# Patient Record
Sex: FEMALE | Race: WHITE | Employment: FULL TIME | URBAN - METROPOLITAN AREA
[De-identification: names, ages, dates, MRNs, and addresses within clinical notes are randomized per-mention and may not be internally consistent; named-entity substitution may affect disease eponyms.]

---

## 2019-03-26 ENCOUNTER — TELEPHONE (OUTPATIENT)
Dept: GENERAL RADIOLOGY | Age: 54
End: 2019-03-26

## 2019-03-29 ENCOUNTER — HOSPITAL ENCOUNTER (OUTPATIENT)
Dept: GENERAL RADIOLOGY | Age: 54
Discharge: HOME | End: 2019-03-31
Payer: COMMERCIAL

## 2019-03-29 DIAGNOSIS — R92.8 ABNORMAL MAMMOGRAM OF RIGHT BREAST: ICD-10-CM

## 2019-03-29 PROCEDURE — 76098 X-RAY EXAM SURGICAL SPECIMEN: CPT

## 2019-03-29 PROCEDURE — A4648 IMPLANTABLE TISSUE MARKER: HCPCS

## 2019-03-29 PROCEDURE — 88360 TUMOR IMMUNOHISTOCHEM/MANUAL: CPT

## 2019-03-29 PROCEDURE — 88305 TISSUE EXAM BY PATHOLOGIST: CPT

## 2019-03-29 PROCEDURE — 2500000003 HC RX 250 WO HCPCS

## 2019-03-29 NOTE — PROGRESS NOTES
Met with patient prior to her breast biopsy. Instructed on role of breast navigator and on stereotactic breast biopsy procedure. Denies use of blood thinners or aspirin products within the past 5 days. I remained with her during the biopsy to provide instruction and emotional support. One core sampled was obtained, then biopsy stopped per Dr. Zarina Weiss. No tissue marker was placed. Per Dr. Lima Spring instruction, core sample sent to pathology. Patient scheduled to return to for repeat stereotactic biopsy next Friday, if specimen is non diagnostic. Instructed that results will be available in approximately 3-5 days. Instructed that Dr. Arndt Dear will be notified of results. Provided with folder containing my contact information, monthly breast self exam card, and post biopsy discharge instructions. Instructed to call me if she has any questions or concerns about her biopsy. Verbalizes understanding.  Dharmesh RN, OCN, CN-BN

## 2019-04-03 ENCOUNTER — TELEPHONE (OUTPATIENT)
Dept: GENERAL RADIOLOGY | Age: 54
End: 2019-04-03

## 2019-04-04 ENCOUNTER — HOSPITAL ENCOUNTER (OUTPATIENT)
Dept: MRI IMAGING | Age: 54
Discharge: HOME | End: 2019-04-06
Payer: COMMERCIAL

## 2019-04-04 DIAGNOSIS — C50.911 MALIGNANT NEOPLASM OF RIGHT FEMALE BREAST, UNSPECIFIED ESTROGEN RECEPTOR STATUS, UNSPECIFIED SITE OF BREAST (HCC): ICD-10-CM

## 2019-04-04 PROCEDURE — 77049 MRI BREAST C-+ W/CAD BI: CPT

## 2019-04-04 PROCEDURE — 6360000004 HC RX CONTRAST MEDICATION: Performed by: RADIOLOGY

## 2019-04-04 PROCEDURE — A9577 INJ MULTIHANCE: HCPCS | Performed by: RADIOLOGY

## 2019-04-04 RX ADMIN — GADOBENATE DIMEGLUMINE 20 ML: 529 INJECTION, SOLUTION INTRAVENOUS at 13:28

## 2019-04-11 NOTE — H&P
[address] [address]                              HISTORY AND PHYSICAL    PATIENT NAME: Pooja Olson                       :        1965  MED REC NO:   [id number]                            ROOM:  ACCOUNT NO:   [de-identified]                           ADMIT DATE: 2019  PROVIDER:     Linda Lares MD    CHIEF COMPLAINT:  Right breast cancer. HISTORY OF PRESENT ILLNESS:  This is a 25-year-old patient who was seen  in the office after undergoing mammogram, ultrasound-guided biopsy of  the right breast returning with diagnosis of a breast cancer,  infiltrating ductal, with questionable some ductal carcinoma in situ  also. The patient was ER positive, ME positive, and also HER-2  positive. At this point in time, the lesion was on mammogram less than  2 cm and also on MRI 9 x 6 x 7 mm. At this point in time, the patient  was given all risks, benefits, alternatives, has agreed to proceed with  right partial mastectomy with needle localization of sentinel nodes,  subsequent oncology consultation for her situation. PAST HISTORY:  Significant for hysterectomy, D and C, questionable  history of angioedema in _____ 2016. MEDICATIONS:  No medications presently. ALLERGIES:  None. REVIEW OF SYSTEMS:  Essentially negative. No bleeding dyscrasias. No  endocrinological problem. No diabetes. No thyroid. No high blood  pressure. No cardiopulmonary problems. No genitourinary problems. No  skin lesions. No artificial joints. FAMILY HISTORY:  Mother is a breast cancer survivor 24 years from  diagnosis. SOCIAL HISTORY:  No smoking. No coffee. No alcohol. No diet  restrictions. PHYSICAL EXAMINATION:  GENERAL:  This is a 25-year-old, oriented x3. HEENT:  Within normal limits. NECK:  Supple without masses. CHEST:  Clear. CARDIAC:  Normal sinus rhythm. ABDOMEN:  Soft, nontender.   No

## 2019-04-12 NOTE — PROGRESS NOTES
[facility] PRE-ADMISSION TESTING INSTRUCTIONS    The Preadmission Testing patient is instructed accordingly using the following criteria (check applicable):    ARRIVAL INSTRUCTIONS:  [x] Parking the day of Surgery is located in the Main Entrance lot. Upon entering the door, make an immediate right to the surgery reception desk    [x] Complimentary [facility] [employer] [facility] is available Monday through Friday 6 am to 6 pm    [x] Bring photo ID and insurance card    [] Bring in a copy of Living will or Durable Power of  papers. [x] Please be sure to arrange for responsible adult to provide transportation to and from the hospital    [x] Please arrange for responsible adult to be with you for the 24 hour period post procedure due to having anesthesia      GENERAL INSTRUCTIONS:    [x] Nothing by mouth after midnight, including gum, candy, mints or water    [x] You may brush your teeth, but do not swallow any water    [] Take medications as instructed with 1-2 oz of water    [x] Stop herbal supplements and vitamins 5 days prior to procedure    [x] Follow preop dosing of blood thinners per physician instructions    [] Take 1/2 dose of evening insulin, but no insulin after midnight    [] No oral diabetic medications after midnight    [] If diabetic and have low blood sugar or feel symptomatic, take 1-2oz apple juice only    [] Bring inhalers day of surgery    [] Bring C-PAP/ Bi-Pap day of surgery    [] Bring urine specimen day of surgery    [x] Shower or bath with soap, lather and rinse well, AM of Surgery, no lotion, powders or creams to surgical site    [] Follow bowel prep as instructed per surgeon    [] No tobacco products within 24 hours of surgery     [x] No alcohol or illegal drug use within 24 hours of surgery.     [x] Jewelry, body piercing's, eyeglasses, contact lenses and dentures are not permitted into surgery (bring cases)      [x] Please do not wear any nail polish, make up or hair

## 2019-04-15 ENCOUNTER — TELEPHONE (OUTPATIENT)
Dept: GENERAL RADIOLOGY | Age: 54
End: 2019-04-15

## 2019-04-15 NOTE — TELEPHONE ENCOUNTER
Left voicemail message for Lori Delgado in mammography at [facility] regarding the scheduled needle localization for [facility]. I notified her that no biopsy clip was placed at the time of the stereotactic biopsy.   Electronically signed by Huyen Martinez RN, BSN on 4/15/2019 at 8:10 AM

## 2019-04-17 ENCOUNTER — TELEPHONE (OUTPATIENT)
Dept: ONCOLOGY | Age: 54
End: 2019-04-17

## 2019-04-17 ENCOUNTER — TELEPHONE (OUTPATIENT)
Dept: CASE MANAGEMENT | Age: 54
End: 2019-04-17

## 2019-04-17 ENCOUNTER — TELEPHONE (OUTPATIENT)
Dept: INFUSION THERAPY | Age: 54
End: 2019-04-17

## 2019-04-17 ENCOUNTER — OFFICE VISIT (OUTPATIENT)
Dept: ONCOLOGY | Age: 54
End: 2019-04-17
Payer: COMMERCIAL

## 2019-04-17 ENCOUNTER — HOSPITAL ENCOUNTER (OUTPATIENT)
Dept: INFUSION THERAPY | Age: 54
Discharge: HOME | End: 2019-04-17
Payer: COMMERCIAL

## 2019-04-17 VITALS
HEIGHT: 63 IN | DIASTOLIC BLOOD PRESSURE: 56 MMHG | HEART RATE: 65 BPM | RESPIRATION RATE: 18 BRPM | TEMPERATURE: 98.4 F | SYSTOLIC BLOOD PRESSURE: 115 MMHG | BODY MASS INDEX: 23.16 KG/M2 | WEIGHT: 130.7 LBS

## 2019-04-17 DIAGNOSIS — C50.911 INFILTRATING DUCTAL CARCINOMA OF RIGHT BREAST, STAGE 1 (HCC): ICD-10-CM

## 2019-04-17 PROCEDURE — 99205 OFFICE O/P NEW HI 60 MIN: CPT | Performed by: INTERNAL MEDICINE

## 2019-04-17 PROCEDURE — 99214 OFFICE O/P EST MOD 30 MIN: CPT | Performed by: INTERNAL MEDICINE

## 2019-04-17 NOTE — TELEPHONE ENCOUNTER
Left detailed message regarding patient's recommended treatment plan, message states that staff will not be returning to the office until 4-22-19. Spoke with Christelle Del Valle, Left message for Dr. Moni Oneill to contact Dr. Jorge Lazcano at [phone] to discuss patient case, she is scheduled for surgery tomorrow. Briefly met with patient it is recommended pending final pathology that patient will receive [address].

## 2019-04-17 NOTE — TELEPHONE ENCOUNTER
Pt works at [employer], and she scheduled her Echo, ordered by Dr Ryne Figueroa, to be done there on May 1st.

## 2019-04-17 NOTE — PROGRESS NOTES
Irregular, enhancing mass in the right breast 10:00 is consistent  with biopsy-proven neoplasm. No additional foci of disease identified  in the right breast.  2. There is no lymphadenopathy. 3. No evidence of neoplasm on the left. RECOMMENDATION:  Continued surgical and oncologic consultation is advised. BIRADS 6: Known neoplasm    The patient is feeling well, no nipple discharge or breast skin changes. Review of Systems;  CONSTITUTIONAL: No fever, chills. Good appetite and energy level. ENMT: Eyes: No diplopia; Nose: No epistaxis. Mouth: No sore throat. RESPIRATORY: No hemoptysis, shortness of breath, cough. CARDIOVASCULAR: No chest pain, palpitations. GASTROINTESTINAL: No nausea/vomiting, abdominal pain, diarrhea/constipation. GENITOURINARY: No dysuria, urinary frequency, hematuria. NEURO: No syncope, presyncope, headache. MSK: she has chronic pain in the back and right hip. Remainder:  ROS NEGATIVE    Past Medical History:      Diagnosis Date    Cancer [facility])     malignant lesion in Right breast - for OR 4-18-19     Hyperlipidemia     no medications      There is no problem list on file for this patient. Past Surgical History:      Procedure Laterality Date    DILATION AND CURETTAGE OF UTERUS      HYSTERECTOMY         Family History:  History reviewed. No pertinent family history. Medications:  Reviewed and reconciled.     Social History:  Social History     Socioeconomic History    Marital status:      Spouse name: Not on file    Number of children: Not on file    Years of education: Not on file    Highest education level: Not on file   Occupational History    Not on file   Social Needs    Financial resource strain: Not on file    Food insecurity:     Worry: Not on file     Inability: Not on file    Transportation needs:     Medical: Not on file     Non-medical: Not on file   Tobacco Use    Smoking status: Never Smoker    Smokeless tobacco: Never Used   Substance and Sexual Activity    Alcohol use: Not Currently    Drug use: Never    Sexual activity: Not on file   Lifestyle    Physical activity:     Days per week: Not on file     Minutes per session: Not on file    Stress: Not on file   Relationships    Social connections:     Talks on phone: Not on file     Gets together: Not on file     Attends [employer] service: Not on file     Active member of club or organization: Not on file     Attends meetings of clubs or organizations: Not on file     Relationship status: Not on file    Intimate partner violence:     Fear of current or ex partner: Not on file     Emotionally abused: Not on file     Physically abused: Not on file     Forced sexual activity: Not on file   Other Topics Concern    Not on file   Social History Narrative    Not on file       Allergies:  No Known Allergies     OB/GYN:  The patient is , she had her menarche at the age of 9-12, she had a hysterectomy, no oophorectomies, 1st live birth was at 20, she used OCPs for about 10 years. Physical Exam:  BP (!) 115/56 (Site: Left Upper Arm, Position: Sitting, Cuff Size: Medium Adult)   Pulse 65   Temp 98.4 °F (36.9 °C) (Temporal)   Resp 18   Ht 5' 3\" (1.6 m)   Wt 130 lb 11.2 oz (59.3 kg)   BMI 23.15 kg/m²   GENERAL: Alert, oriented x 3, not in acute distress. HEENT: PERRLA; EOMI. Oropharynx clear. NECK: Supple. No palpable cervical or supraclavicular lymphadenopathy. LUNGS: Good air entry bilaterally. No wheezing, crackles or rhonchi. BREASTS: The left breast exam is negative for any skin changes, no nipple discharge, no palpable masses, no palpable left axillary lymphadenopathy, the right breast exam is negative for any nipple discharge, no discrete masses, no skin erythema, no palpable right axillary lymphadenopathy. CARDIOVASCULAR: Regular rate. No murmurs, rubs or gallops. ABDOMEN: Soft. Non-tender, non-distended. Positive bowel sounds.   EXTREMITIES: Without clubbing, cyanosis, or edema.   NEUROLOGIC: No focal deficits. ECOG PS 0    Impression/Plan:      The patient is a 43-year-old lady with a PMH significant for hyperlipidemia, who had presented with an abnormal screening Mammogram, she was diagnosed with a right breast invasive ductal carcinoma, grade 2, measuring 9 mm, ER positive, 80% NJ +5% HER-2/dougie positive, 3+ by IHC. Patient with clinical stage [id number] disease. I discussed with the patient and her spouse her diagnosis, characteristics of her tumor, prognosis, and recommendations for adjuvant treatment. The patient has a triple positive breast cancer, tumor size less than 1cm, grade 2, and clinically negative lymph nodes, the patient is scheduled for a right breast lumpectomy and excisional SLNB,  adjuvant chemotherapy with TCH regimen is recommended, adjuvant endocrine therapy with an AI if she is postmenopausal is also recommended, will order for the patient biochemical testing to evaluate her menopausal status. The side effects of the treatment were reviewed with the patient, he will have a chemo teach and 2 D echocariogram, will request from Dr. Serena Steward to place a mediport.      Patient with a personal history of breast cancer, family history is positive for breast and ovarian cancer, testing for HBOC is recommended, MyRisk testing was ordered. RTC in about 2 weeks. Thank you for allowing us to participate in the care of Mrs. Quintanilla.     Gilford Alexandria, MD   HEMATOLOGY/MEDICAL ONCOLOGY  [address] [place] [address] MED ONCOLOGY  [facility] [id number]  Dept: [phone]

## 2019-04-17 NOTE — TELEPHONE ENCOUNTER
At med onc consult, spoke with pt re: ACS programs, resources, and role of ACS navigator. Pt stated that she has no needs at this time but would follow up if needs arise. Provided with ACS navigator's contact information.

## 2019-04-17 NOTE — PROGRESS NOTES
Vivi Hopson  1965 46 y.o. Referring Physician: Rupal Ramachandran    PCP: Shy David MD    Vitals:    19 1422   BP: (!) 115/56   Pulse: 65   Resp: 18   Temp: 98.4 °F (36.9 °C)        Wt Readings from Last 3 Encounters:   19 130 lb 11.2 oz (59.3 kg)        Body mass index is 23.15 kg/m². Chief Complaint: Patient referred d/t right breast pathology infiltrating ductal carcinoma, moderately differentiated, ER positive, OH positive, Her 2 positive. Chief Complaint   Patient presents with    New Patient         Cancer Staging  No matching staging information was found for the patient. Prior Radiation Therapy? NO    Concurrent Chemo/radiation? NO    Prior Chemotherapy? NO    Prior Hormonal Therapy? NO    Head and Neck Cancer? No, patient does NOT have HN cancer. LMP:     Age at first Menses: 6 yrs    : 5    Para: 4        No current outpatient medications on file.        Past Medical History:   Diagnosis Date    Cancer [facility])     malignant lesion in Right breast - for OR -19     Hyperlipidemia     no medications        Past Surgical History:   Procedure Laterality Date    BREAST BIOPSY      DILATION AND CURETTAGE OF UTERUS      HYSTERECTOMY         Family History   Problem Relation Age of Onset    Breast Cancer Mother     Cancer Father         esophageal, stomach    Cancer Paternal Grandfather         colon       Social History     Socioeconomic History    Marital status:      Spouse name: Not on file    Number of children: Not on file    Years of education: Not on file    Highest education level: Not on file   Occupational History    Not on file   Social Needs    Financial resource strain: Not on file    Food insecurity:     Worry: Not on file     Inability: Not on file    Transportation needs:     Medical: Not on file     Non-medical: Not on file   Tobacco Use    Smoking status: Never Smoker    Smokeless tobacco: Never Used   Substance and Sexual Activity    Alcohol use: Not Currently    Drug use: Never    Sexual activity: Yes     Partners: Male   Lifestyle    Physical activity:     Days per week: Not on file     Minutes per session: Not on file    Stress: Not on file   Relationships    Social connections:     Talks on phone: Not on file     Gets together: Not on file     Attends Hoahaoism service: Not on file     Active member of club or organization: Not on file     Attends meetings of clubs or organizations: Not on file     Relationship status: Not on file    Intimate partner violence:     Fear of current or ex partner: Not on file     Emotionally abused: Not on file     Physically abused: Not on file     Forced sexual activity: Not on file   Other Topics Concern    Not on file   Social History Narrative    Not on file           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 Pacemaker/Defibulator/ICD:  No    Mediport: No           FALLS RISK SCREENING ASSESSMENT    Instructions:  Assess the patient and Marshall the appropriate indicators that are present for fall risk identification. Total the numbers circled and assign a fall risk score from Table 2.  Reassess patient at a minimum every 12 weeks or with status change. Assessment   Date  4/17/2019     1. Mental Ability: confusion/cognitively impaired No - 0       2. Elimination Issues: incontinence, frequency No - 0       3. Ambulatory: use of assistive devices (walker, cane, off-loading devices), attached to equipment (IV pole, oxygen) No - 0     4. Sensory Limitations: dizziness, vertigo, impaired vision No - 0       5. Age Less than 65 years - 0       6. Medication: diuretics, strong analgesics, hypnotics, sedatives, antihypertensive agents   No - 0   7.   Falls:  recent history of falls within the last 3 months (not to include slipping or tripping)   No - 0   TOTAL 0    If score of 4 or greater was education given? No       TABLE 2   Risk Score Risk Level Plan of Care   0-3 Little or  No Risk 1. Provide assistance as indicated for ambulation activities  2. Reorient confused/cognitively impaired patient  3. Call-light/bell within patient's reach  4. Chair/bed in low position, stretcher/bed with siderails up except when performing patient care activities  5. Educate patient/family/caregiver on falls prevention  6.  Reassess in 12 weeks or with any noted change in patient condition which places them at a risk for a fall   4-6 Moderate Risk 1. Provide assistance as indicated for ambulation activities  2. Reorient confused/cognitively impaired patient  3. Call-light/bell within patient's reach  4. Chair/bed in low position, stretcher/bed with siderails up except when performing patient care activities  5. Educate patient/family/caregiver on falls prevention  6. Falls risk precaution (Yellow sticker Level II) placed on patient chart   7 or   Higher High Risk 1. Place patient in easily observable treatment room  2. Patient attended at all times by family member or staff  3. Provide assistance as indicated for ambulation activities  4. Reorient confused/cognitively impaired patient  5. Call-light/bell within patient's reach  6. Chair/bed in low position, stretcher/bed with siderails up except when performing patient care activities  7. Educate patient/family/caregiver on falls prevention  8. Falls risk precaution (Yellow sticker Level III) placed on patient chart           MALNUTRITION RISK SCREENING ASSESSMENT    Instructions:  Assess the patient and enter the appropriate indicators that are present for nutrition risk identification. Total the numbers entered and assign a risk score. Follow the appropriate action for total score listed below. Assessment   Date  4/17/2019     1. Have you lost weight without trying? 0- No     2. Have you been eating poorly because of a decreased appetite?

## 2019-04-18 ENCOUNTER — APPOINTMENT (OUTPATIENT)
Dept: MAMMOGRAPHY | Age: 54
End: 2019-04-18
Attending: SURGERY
Payer: COMMERCIAL

## 2019-04-18 ENCOUNTER — ANESTHESIA EVENT (OUTPATIENT)
Dept: OPERATING ROOM | Age: 54
End: 2019-04-18
Payer: COMMERCIAL

## 2019-04-18 ENCOUNTER — ANESTHESIA (OUTPATIENT)
Dept: OPERATING ROOM | Age: 54
End: 2019-04-18
Payer: COMMERCIAL

## 2019-04-18 ENCOUNTER — HOSPITAL ENCOUNTER (OUTPATIENT)
Age: 54
Setting detail: OUTPATIENT SURGERY
Discharge: HOME | End: 2019-04-18
Attending: SURGERY | Admitting: SURGERY
Payer: COMMERCIAL

## 2019-04-18 VITALS
DIASTOLIC BLOOD PRESSURE: 68 MMHG | SYSTOLIC BLOOD PRESSURE: 116 MMHG | TEMPERATURE: 97 F | HEIGHT: 63 IN | HEART RATE: 78 BPM | RESPIRATION RATE: 20 BRPM | OXYGEN SATURATION: 98 % | BODY MASS INDEX: 22.68 KG/M2 | WEIGHT: 128 LBS

## 2019-04-18 VITALS
DIASTOLIC BLOOD PRESSURE: 64 MMHG | OXYGEN SATURATION: 100 % | RESPIRATION RATE: 2 BRPM | SYSTOLIC BLOOD PRESSURE: 129 MMHG

## 2019-04-18 DIAGNOSIS — C50.911 MALIGNANT NEOPLASM OF RIGHT FEMALE BREAST, UNSPECIFIED ESTROGEN RECEPTOR STATUS, UNSPECIFIED SITE OF BREAST (HCC): ICD-10-CM

## 2019-04-18 LAB
HCT VFR BLD CALC: 43.7 % (ref 34–48)
HEMOGLOBIN: 13.6 G/DL (ref 11.5–15.5)
MCH RBC QN AUTO: 30.4 PG (ref 26–35)
MCHC RBC AUTO-ENTMCNC: 31.1 % (ref 32–34.5)
MCV RBC AUTO: 97.8 FL (ref 80–99.9)
PDW BLD-RTO: 13.1 FL (ref 11.5–15)
PLATELET # BLD: 276 E9/L (ref 130–450)
PMV BLD AUTO: 10.3 FL (ref 7–12)
RBC # BLD: 4.47 E12/L (ref 3.5–5.5)
WBC # BLD: 4.8 E9/L (ref 4.5–11.5)

## 2019-04-18 PROCEDURE — 6370000000 HC RX 637 (ALT 250 FOR IP)

## 2019-04-18 PROCEDURE — 7100000011 HC PHASE II RECOVERY - ADDTL 15 MIN: Performed by: SURGERY

## 2019-04-18 PROCEDURE — 7100000010 HC PHASE II RECOVERY - FIRST 15 MIN: Performed by: SURGERY

## 2019-04-18 PROCEDURE — 76098 X-RAY EXAM SURGICAL SPECIMEN: CPT

## 2019-04-18 PROCEDURE — 36415 COLL VENOUS BLD VENIPUNCTURE: CPT

## 2019-04-18 PROCEDURE — 3700000000 HC ANESTHESIA ATTENDED CARE: Performed by: SURGERY

## 2019-04-18 PROCEDURE — 6360000002 HC RX W HCPCS: Performed by: SURGERY

## 2019-04-18 PROCEDURE — 7100000000 HC PACU RECOVERY - FIRST 15 MIN: Performed by: SURGERY

## 2019-04-18 PROCEDURE — 7100000001 HC PACU RECOVERY - ADDTL 15 MIN: Performed by: SURGERY

## 2019-04-18 PROCEDURE — 19281 PERQ DEVICE BREAST 1ST IMAG: CPT

## 2019-04-18 PROCEDURE — 2500000003 HC RX 250 WO HCPCS: Performed by: NURSE ANESTHETIST, CERTIFIED REGISTERED

## 2019-04-18 PROCEDURE — 88307 TISSUE EXAM BY PATHOLOGIST: CPT

## 2019-04-18 PROCEDURE — 2580000003 HC RX 258: Performed by: SURGERY

## 2019-04-18 PROCEDURE — 88333 PATH CONSLTJ SURG CYTO XM 1: CPT

## 2019-04-18 PROCEDURE — 2709999900 HC NON-CHARGEABLE SUPPLY: Performed by: SURGERY

## 2019-04-18 PROCEDURE — 2580000003 HC RX 258: Performed by: NURSE ANESTHETIST, CERTIFIED REGISTERED

## 2019-04-18 PROCEDURE — 85027 COMPLETE CBC AUTOMATED: CPT

## 2019-04-18 PROCEDURE — 88334 PATH CONSLTJ SURG CYTO XM EA: CPT

## 2019-04-18 PROCEDURE — 3600000012 HC SURGERY LEVEL 2 ADDTL 15MIN: Performed by: SURGERY

## 2019-04-18 PROCEDURE — 3700000001 HC ADD 15 MINUTES (ANESTHESIA): Performed by: SURGERY

## 2019-04-18 PROCEDURE — 6360000002 HC RX W HCPCS

## 2019-04-18 PROCEDURE — 6360000002 HC RX W HCPCS: Performed by: NURSE ANESTHETIST, CERTIFIED REGISTERED

## 2019-04-18 PROCEDURE — 6360000002 HC RX W HCPCS: Performed by: ANESTHESIOLOGY

## 2019-04-18 PROCEDURE — 3600000002 HC SURGERY LEVEL 2 BASE: Performed by: SURGERY

## 2019-04-18 PROCEDURE — 88305 TISSUE EXAM BY PATHOLOGIST: CPT

## 2019-04-18 RX ORDER — FENTANYL CITRATE 50 UG/ML
INJECTION, SOLUTION INTRAMUSCULAR; INTRAVENOUS PRN
Status: DISCONTINUED | OUTPATIENT
Start: 2019-04-18 | End: 2019-04-18 | Stop reason: SDUPTHER

## 2019-04-18 RX ORDER — FENTANYL CITRATE 50 UG/ML
25 INJECTION, SOLUTION INTRAMUSCULAR; INTRAVENOUS EVERY 5 MIN PRN
Status: DISCONTINUED | OUTPATIENT
Start: 2019-04-18 | End: 2019-04-18 | Stop reason: HOSPADM

## 2019-04-18 RX ORDER — PROPOFOL 10 MG/ML
INJECTION, EMULSION INTRAVENOUS PRN
Status: DISCONTINUED | OUTPATIENT
Start: 2019-04-18 | End: 2019-04-18 | Stop reason: SDUPTHER

## 2019-04-18 RX ORDER — DEXAMETHASONE SODIUM PHOSPHATE 4 MG/ML
INJECTION, SOLUTION INTRA-ARTICULAR; INTRALESIONAL; INTRAMUSCULAR; INTRAVENOUS; SOFT TISSUE PRN
Status: DISCONTINUED | OUTPATIENT
Start: 2019-04-18 | End: 2019-04-18 | Stop reason: SDUPTHER

## 2019-04-18 RX ORDER — SODIUM CHLORIDE 0.9 % (FLUSH) 0.9 %
10 SYRINGE (ML) INJECTION EVERY 12 HOURS SCHEDULED
Status: DISCONTINUED | OUTPATIENT
Start: 2019-04-18 | End: 2019-04-18 | Stop reason: HOSPADM

## 2019-04-18 RX ORDER — MIDAZOLAM HYDROCHLORIDE 1 MG/ML
INJECTION INTRAMUSCULAR; INTRAVENOUS PRN
Status: DISCONTINUED | OUTPATIENT
Start: 2019-04-18 | End: 2019-04-18 | Stop reason: SDUPTHER

## 2019-04-18 RX ORDER — FENTANYL CITRATE 50 UG/ML
25 INJECTION, SOLUTION INTRAMUSCULAR; INTRAVENOUS EVERY 5 MIN PRN
Status: CANCELLED | OUTPATIENT
Start: 2019-04-18

## 2019-04-18 RX ORDER — ONDANSETRON 2 MG/ML
INJECTION INTRAMUSCULAR; INTRAVENOUS PRN
Status: DISCONTINUED | OUTPATIENT
Start: 2019-04-18 | End: 2019-04-18 | Stop reason: SDUPTHER

## 2019-04-18 RX ORDER — FENTANYL CITRATE 50 UG/ML
50 INJECTION, SOLUTION INTRAMUSCULAR; INTRAVENOUS EVERY 5 MIN PRN
Status: DISCONTINUED | OUTPATIENT
Start: 2019-04-18 | End: 2019-04-18 | Stop reason: HOSPADM

## 2019-04-18 RX ORDER — HYDROCODONE BITARTRATE AND ACETAMINOPHEN 5; 325 MG/1; MG/1
TABLET ORAL
Status: COMPLETED
Start: 2019-04-18 | End: 2019-04-18

## 2019-04-18 RX ORDER — HYDROCODONE BITARTRATE AND ACETAMINOPHEN 5; 325 MG/1; MG/1
1 TABLET ORAL ONCE
Status: COMPLETED | OUTPATIENT
Start: 2019-04-18 | End: 2019-04-18

## 2019-04-18 RX ORDER — METHYLENE BLUE 10 MG/ML
INJECTION INTRAVENOUS PRN
Status: DISCONTINUED | OUTPATIENT
Start: 2019-04-18 | End: 2019-04-18 | Stop reason: ALTCHOICE

## 2019-04-18 RX ORDER — LIDOCAINE HYDROCHLORIDE 20 MG/ML
INJECTION, SOLUTION EPIDURAL; INFILTRATION; INTRACAUDAL; PERINEURAL PRN
Status: DISCONTINUED | OUTPATIENT
Start: 2019-04-18 | End: 2019-04-18 | Stop reason: SDUPTHER

## 2019-04-18 RX ORDER — MEPERIDINE HYDROCHLORIDE 25 MG/ML
25 INJECTION INTRAMUSCULAR; INTRAVENOUS; SUBCUTANEOUS EVERY 5 MIN PRN
Status: DISCONTINUED | OUTPATIENT
Start: 2019-04-18 | End: 2019-04-18 | Stop reason: HOSPADM

## 2019-04-18 RX ORDER — ONDANSETRON 2 MG/ML
4 INJECTION INTRAMUSCULAR; INTRAVENOUS
Status: DISCONTINUED | OUTPATIENT
Start: 2019-04-18 | End: 2019-04-18 | Stop reason: HOSPADM

## 2019-04-18 RX ORDER — SODIUM CHLORIDE, SODIUM LACTATE, POTASSIUM CHLORIDE, CALCIUM CHLORIDE 600; 310; 30; 20 MG/100ML; MG/100ML; MG/100ML; MG/100ML
INJECTION, SOLUTION INTRAVENOUS CONTINUOUS
Status: DISCONTINUED | OUTPATIENT
Start: 2019-04-18 | End: 2019-04-18 | Stop reason: HOSPADM

## 2019-04-18 RX ORDER — SODIUM CHLORIDE, SODIUM LACTATE, POTASSIUM CHLORIDE, CALCIUM CHLORIDE 600; 310; 30; 20 MG/100ML; MG/100ML; MG/100ML; MG/100ML
INJECTION, SOLUTION INTRAVENOUS CONTINUOUS PRN
Status: DISCONTINUED | OUTPATIENT
Start: 2019-04-18 | End: 2019-04-18 | Stop reason: SDUPTHER

## 2019-04-18 RX ADMIN — ONDANSETRON HYDROCHLORIDE 4 MG: 2 INJECTION, SOLUTION INTRAMUSCULAR; INTRAVENOUS at 11:13

## 2019-04-18 RX ADMIN — HYDROMORPHONE HYDROCHLORIDE 0.25 MG: 1 INJECTION, SOLUTION INTRAMUSCULAR; INTRAVENOUS; SUBCUTANEOUS at 12:30

## 2019-04-18 RX ADMIN — HYDROMORPHONE HYDROCHLORIDE 0.25 MG: 1 INJECTION, SOLUTION INTRAMUSCULAR; INTRAVENOUS; SUBCUTANEOUS at 12:14

## 2019-04-18 RX ADMIN — HYDROCODONE BITARTRATE AND ACETAMINOPHEN 1 TABLET: 5; 325 TABLET ORAL at 13:42

## 2019-04-18 RX ADMIN — SODIUM CHLORIDE, POTASSIUM CHLORIDE, SODIUM LACTATE AND CALCIUM CHLORIDE: 600; 310; 30; 20 INJECTION, SOLUTION INTRAVENOUS at 11:53

## 2019-04-18 RX ADMIN — HYDROMORPHONE HYDROCHLORIDE 0.25 MG: 1 INJECTION, SOLUTION INTRAMUSCULAR; INTRAVENOUS; SUBCUTANEOUS at 11:54

## 2019-04-18 RX ADMIN — HYDROMORPHONE HYDROCHLORIDE 0.25 MG: 1 INJECTION, SOLUTION INTRAMUSCULAR; INTRAVENOUS; SUBCUTANEOUS at 12:25

## 2019-04-18 RX ADMIN — PROPOFOL 150 MG: 10 INJECTION, EMULSION INTRAVENOUS at 10:58

## 2019-04-18 RX ADMIN — FENTANYL CITRATE 100 MCG: 50 INJECTION, SOLUTION INTRAMUSCULAR; INTRAVENOUS at 10:55

## 2019-04-18 RX ADMIN — HYDROMORPHONE HYDROCHLORIDE 0.25 MG: 1 INJECTION, SOLUTION INTRAMUSCULAR; INTRAVENOUS; SUBCUTANEOUS at 12:09

## 2019-04-18 RX ADMIN — HYDROMORPHONE HYDROCHLORIDE 0.25 MG: 1 INJECTION, SOLUTION INTRAMUSCULAR; INTRAVENOUS; SUBCUTANEOUS at 12:19

## 2019-04-18 RX ADMIN — HYDROMORPHONE HYDROCHLORIDE 0.25 MG: 1 INJECTION, SOLUTION INTRAMUSCULAR; INTRAVENOUS; SUBCUTANEOUS at 12:04

## 2019-04-18 RX ADMIN — HYDROMORPHONE HYDROCHLORIDE 0.25 MG: 1 INJECTION, SOLUTION INTRAMUSCULAR; INTRAVENOUS; SUBCUTANEOUS at 11:59

## 2019-04-18 RX ADMIN — MIDAZOLAM HYDROCHLORIDE 2 MG: 1 INJECTION, SOLUTION INTRAMUSCULAR; INTRAVENOUS at 10:55

## 2019-04-18 RX ADMIN — Medication 2 G: at 10:55

## 2019-04-18 RX ADMIN — SODIUM CHLORIDE, POTASSIUM CHLORIDE, SODIUM LACTATE AND CALCIUM CHLORIDE: 600; 310; 30; 20 INJECTION, SOLUTION INTRAVENOUS at 10:55

## 2019-04-18 RX ADMIN — DEXAMETHASONE SODIUM PHOSPHATE 8 MG: 4 INJECTION, SOLUTION INTRAMUSCULAR; INTRAVENOUS at 11:13

## 2019-04-18 RX ADMIN — LIDOCAINE HYDROCHLORIDE 40 MG: 20 INJECTION, SOLUTION EPIDURAL; INFILTRATION; INTRACAUDAL; PERINEURAL at 10:58

## 2019-04-18 ASSESSMENT — PULMONARY FUNCTION TESTS
PIF_VALUE: 2
PIF_VALUE: 12
PIF_VALUE: 9
PIF_VALUE: 2
PIF_VALUE: 1
PIF_VALUE: 2
PIF_VALUE: 11
PIF_VALUE: 2
PIF_VALUE: 0
PIF_VALUE: 16
PIF_VALUE: 14
PIF_VALUE: 2
PIF_VALUE: 1
PIF_VALUE: 1
PIF_VALUE: 2
PIF_VALUE: 2
PIF_VALUE: 10
PIF_VALUE: 12
PIF_VALUE: 15
PIF_VALUE: 2
PIF_VALUE: 1
PIF_VALUE: 11
PIF_VALUE: 2
PIF_VALUE: 17
PIF_VALUE: 2
PIF_VALUE: 9
PIF_VALUE: 2
PIF_VALUE: 17
PIF_VALUE: 2
PIF_VALUE: 19
PIF_VALUE: 16
PIF_VALUE: 2
PIF_VALUE: 1
PIF_VALUE: 2
PIF_VALUE: 17
PIF_VALUE: 4

## 2019-04-18 ASSESSMENT — PAIN SCALES - GENERAL
PAINLEVEL_OUTOF10: 8
PAINLEVEL_OUTOF10: 3
PAINLEVEL_OUTOF10: 8
PAINLEVEL_OUTOF10: 7
PAINLEVEL_OUTOF10: 7
PAINLEVEL_OUTOF10: 8
PAINLEVEL_OUTOF10: 6
PAINLEVEL_OUTOF10: 10
PAINLEVEL_OUTOF10: 8
PAINLEVEL_OUTOF10: 7
PAINLEVEL_OUTOF10: 8

## 2019-04-18 ASSESSMENT — PAIN DESCRIPTION - LOCATION
LOCATION: BREAST
LOCATION: BREAST

## 2019-04-18 ASSESSMENT — PAIN SCALES - WONG BAKER: WONGBAKER_NUMERICALRESPONSE: 0

## 2019-04-18 ASSESSMENT — PAIN - FUNCTIONAL ASSESSMENT: PAIN_FUNCTIONAL_ASSESSMENT: 0-10

## 2019-04-18 ASSESSMENT — PAIN DESCRIPTION - PAIN TYPE: TYPE: SURGICAL PAIN

## 2019-04-18 ASSESSMENT — PAIN DESCRIPTION - ORIENTATION
ORIENTATION: RIGHT
ORIENTATION: RIGHT

## 2019-04-18 NOTE — PROGRESS NOTES
CLINICAL PHARMACY NOTE: MEDS TO [address] Select Patient?: No  Total # of Prescriptions Filled: 1   The following medications were delivered to the patient:  · Hydrocodone/Apap 5-325mg  Total # of Interventions Completed: 4  Time Spent (min): 45    Additional Documentation:

## 2019-04-18 NOTE — ANESTHESIA PRE PROCEDURE
Department of Anesthesiology  Preprocedure Note       Name:  Migue Ma   Age:  46 y.o.  :  1965                                          MRN:  [id number]         Date:  2019      Surgeon: Jean-Paul Henriquez):  Lulu Nugent MD    Procedure: RIGHT PARTIAL MASTECTOMY WITH NEEDLE LOCALIZATION AND SENTINEL NODE   +++NEEDLE LOCAL 0900+++ (Right Breast)    Medications prior to admission:   Prior to Admission medications    Not on File       Current medications:    Current Facility-Administered Medications   Medication Dose Route Frequency Provider Last Rate Last Dose    lactated ringers infusion   Intravenous Continuous Lulu Nugent MD        sodium chloride flush 0.9 % injection 10 mL  10 mL Intravenous 2 times per day Lulu Nugent MD        ceFAZolin (ANCEF) 2 g in dextrose 5 % 100 mL IVPB  2 g Intravenous Once Lulu Nugent MD           Allergies:  No Known Allergies    Problem List:    Patient Active Problem List   Diagnosis Code    Infiltrating ductal carcinoma of right breast, stage 1 ([facility] [address] C50.911       Past Medical History:        Diagnosis Date    Cancer ([facility] [address]     malignant lesion in Right breast - for OR 19     Hyperlipidemia     no medications        Past Surgical History:        Procedure Laterality Date    BREAST BIOPSY      DILATION AND CURETTAGE OF UTERUS      HYSTERECTOMY         Social History:    Social History     Tobacco Use    Smoking status: Never Smoker    Smokeless tobacco: Never Used   Substance Use Topics    Alcohol use: Not Currently                                Counseling given: Not Answered      Vital Signs (Current):   Vitals:    19 1128 19 0738   BP:  (!) 151/64   Pulse:  67   Resp:  18   Temp:  97.8 °F (36.6 °C)   TempSrc:  Temporal   SpO2:  99%   Weight: 128 lb (58.1 kg) 128 lb (58.1 kg)   Height: 5' 3\" (1.6 m) 5' 3\" (1.6 m)                                              BP Readings from Last 3 Encounters:   19 (!) 151/64   19 (!) 115/56 NPO Status: Time of last liquid consumption: 2100                        Time of last solid consumption: 1800                        Date of last liquid consumption: 04/17/19                        Date of last solid food consumption: 04/17/19    BMI:   Wt Readings from Last 3 Encounters:   04/18/19 128 lb (58.1 kg)   04/17/19 130 lb 11.2 oz (59.3 kg)     Body mass index is 22.67 kg/m². CBC:   Lab Results   Component Value Date    WBC 4.8 04/18/2019    RBC 4.47 04/18/2019    HGB 13.6 04/18/2019    HCT 43.7 04/18/2019    MCV 97.8 04/18/2019    RDW 13.1 04/18/2019     04/18/2019       CMP: No results found for: NA, K, CL, CO2, BUN, CREATININE, GFRAA, AGRATIO, LABGLOM, GLUCOSE, PROT, CALCIUM, BILITOT, ALKPHOS, AST, ALT    POC Tests: No results for input(s): POCGLU, POCNA, POCK, POCCL, POCBUN, POCHEMO, POCHCT in the last 72 hours. Coags: No results found for: PROTIME, INR, APTT    HCG (If Applicable): No results found for: PREGTESTUR, PREGSERUM, HCG, HCGQUANT     ABGs: No results found for: PHART, PO2ART, CMY0FAI, GIC5KLH, BEART, R5NYONAK     Type & Screen (If Applicable):  No results found for: [facility]    Anesthesia Evaluation  Patient summary reviewed  Airway: Mallampati: II  TM distance: >3 FB   Neck ROM: full  Mouth opening: > = 3 FB Dental: normal exam         Pulmonary:Negative Pulmonary ROS and normal exam                               Cardiovascular:Negative CV ROS                      Neuro/Psych:   Negative Neuro/Psych ROS              GI/Hepatic/Renal:             Endo/Other:    (+) malignancy/cancer. Abdominal:           Vascular:                                        Anesthesia Plan      general     ASA 2       Induction: intravenous. Anesthetic plan and risks discussed with patient. Plan discussed with CRNA.                   Veronica Hewitt MD   4/18/2019

## 2019-04-18 NOTE — PROGRESS NOTES
INTRAOPERATIVE CONSULTATION (with CYTOLOGY TOUCH PREPARATION/EXAMINATION)      B. Right breast, sentinel nodes (2), biopsy: TP slides show no evidence of malignancy.

## 2019-04-18 NOTE — DISCHARGE INSTR - COC
Discharging to Facility/ Agency   · Name:   · Address:  · Phone:  · Fax:    Dialysis Facility (if applicable)   · Name:  · Address:  · Dialysis Schedule:  · Phone:  · Fax:    / signature: {Esignature:[id number]}    PHYSICIAN SECTION    Prognosis: {Prognosis:9917267849}    Condition at Discharge: Juan Go Patient Condition:515855495}    Rehab Potential (if transferring to Rehab): {Prognosis:6492880681}    Recommended Labs or Other Treatments After Discharge: ***    Physician Certification: I certify the above information and transfer of Misti Luther  is necessary for the continuing treatment of the diagnosis listed and that she requires {Admit to Appropriate Level of Care:12621} for {GREATER/LESS:917725755} 30 days.      Update Admission H&P: {CHP DME Changes in BDEDP:167005540}    PHYSICIAN SIGNATURE:  {Esignature:[id number]}

## 2019-04-19 NOTE — OP NOTE
[id number] [address]                                OPERATIVE REPORT    PATIENT NAME: Jessica Abdi                       :        1965  MED REC NO:   [id number]                            ROOM:  ACCOUNT NO:   [de-identified]                           ADMIT DATE: 2019  PROVIDER:     Rosina Eisenmenger, MD    DATE OF PROCEDURE:  2019    PREOPERATIVE DIAGNOSES:  Invasive ductal carcinoma, ER/WA positive,  HER-2 positive. POSTOPERATIVE DIAGNOSES:  Invasive ductal carcinoma, ER/WA positive,  HER-2 positive, pending path. OPERATION PERFORMED:  Right partial mastectomy after needle localization  with subareolar injection of methylene blue, sentinel node dissection,  and complex closure. DESCRIPTION OF PROCEDURE:  The patient had been needle localized by the  radiologist, right breast, brought to the operative suite, placed under  general anesthesia. Prior to prepping, subareolar injection of a  solution of 50% methylene blue and saline, approximately 10 mL was  injected, massaged for approximately 10 minutes. After which, the  entire right chest, neck, axilla, upper arm, lower and upper abdomen  were prepped with Betadine and draped in usual manner. A curvilinear  incision was made, upper outer quadrant directly over where a needle was  placed, taken down followed to whereupon needle tip was identified. The  mass was then taken out, large portion of breast tissue in a cylindrical  manner, marking the specimen short-superior and long-lateral.  Specimen  was sent to mammography, found to be contained and adequate. Some more  tissue was taken more anterior and superior to be sent separately. From  this incision, we were able to enter the axillary region. Lymphatics  were followed. Two lymph nodes were identified. They were both removed  consistent with sentinel node, sent to pathology for touch prep. Both  were negative on touch prep. The entire area was irrigated well. Hemostasis was achieved. Closure was then done after flaps were made  superior and inferiorly to decrease tension, approximating the tissue  with 3-0 Vicryl suture interrupted inverting for deep as well as  subdermally 4-0 Vicryl subcuticular. Dermabond was applied. Estimated  blood loss was less than 5 mL. The patient tolerated the entire  procedure well.         Shirley Rogers MD    D: 04/18/2019 11:50:22       T: 04/18/2019 14:18:36     [id number]  Job#: [id number]     Doc#: [id number]    CC:  Jose Odonnell MD

## 2019-04-24 LAB
ESTRADIOL LEVEL: <5 PG/ML
FOLLICLE STIMULATING HORMONE: 83.3 MIU/ML
LUTEINIZING HORMONE: 27.8 MIU/ML

## 2019-05-01 ENCOUNTER — TELEPHONE (OUTPATIENT)
Dept: ONCOLOGY | Age: 54
End: 2019-05-01

## 2019-05-01 ENCOUNTER — TELEPHONE (OUTPATIENT)
Dept: VASCULAR SURGERY | Age: 54
End: 2019-05-01

## 2019-05-01 NOTE — TELEPHONE ENCOUNTER
Pt called office and stated they did not insert a mediport, but was referred to another doctor. I returned call and informed pt per doctor that she  cancelled the port. Pt follows with doctor on 5-7-19 for any further questions.

## 2019-05-07 ENCOUNTER — TELEPHONE (OUTPATIENT)
Dept: VASCULAR SURGERY | Age: 54
End: 2019-05-07

## 2019-05-07 ENCOUNTER — HOSPITAL ENCOUNTER (OUTPATIENT)
Dept: INFUSION THERAPY | Age: 54
Discharge: HOME | End: 2019-05-07
Payer: COMMERCIAL

## 2019-05-07 ENCOUNTER — OFFICE VISIT (OUTPATIENT)
Dept: ONCOLOGY | Age: 54
End: 2019-05-07
Payer: COMMERCIAL

## 2019-05-07 VITALS
RESPIRATION RATE: 20 BRPM | HEIGHT: 63 IN | SYSTOLIC BLOOD PRESSURE: 137 MMHG | TEMPERATURE: 97.9 F | DIASTOLIC BLOOD PRESSURE: 78 MMHG | BODY MASS INDEX: 23.12 KG/M2 | WEIGHT: 130.5 LBS | HEART RATE: 75 BPM

## 2019-05-07 DIAGNOSIS — T45.1X5A ALOPECIA DUE TO CYTOTOXIC DRUG: ICD-10-CM

## 2019-05-07 DIAGNOSIS — C50.911 INFILTRATING DUCTAL CARCINOMA OF RIGHT BREAST, STAGE 1 (HCC): Primary | ICD-10-CM

## 2019-05-07 DIAGNOSIS — C50.911 INFILTRATING DUCTAL CARCINOMA OF RIGHT BREAST, STAGE 1 (HCC): ICD-10-CM

## 2019-05-07 DIAGNOSIS — L65.8 ALOPECIA DUE TO CYTOTOXIC DRUG: ICD-10-CM

## 2019-05-07 PROCEDURE — 99212 OFFICE O/P EST SF 10 MIN: CPT | Performed by: INTERNAL MEDICINE

## 2019-05-07 PROCEDURE — 99214 OFFICE O/P EST MOD 30 MIN: CPT | Performed by: INTERNAL MEDICINE

## 2019-05-07 NOTE — TELEPHONE ENCOUNTER
Referral received from Dr. Yogesh Harris for insertion of venous port, message left on pt's answering machine for a return call.

## 2019-05-12 NOTE — PROGRESS NOTES
[address] MED ONCOLOGY  [address] [facility] [address]  Dept: [phone]  Attending Progress Note      Reason for Visit:   Right Breast Cancer. Referring Physician:  Sandie Toscano MD    PCP:  Cornelius Brewer MD    History of Present Illness: The patient is a 77-year-old lady with a PMH significant for hyperlipidemia, who had presented with an abnormal screening Mammogram, was done on 3/12/2019, revealing calcifications in the right UOQ, she had a right breast US done on 3/13/34263, no masses were seen, she underwent on 3/29/2019 a stereotactic biopsy of the right breast calcifications. Due to biopsy needle malfunction and lack of tissue obtained,  a clip was not placed at the time of biopsy, path:    Breast, right, site not further specified, core needle biopsy:  Infiltrating ductal carcinoma, moderately differentiated. Ductal carcinoma in situ, comedo type, high nuclear grade. Comment:     Intradepartmental consultation is obtained.  Infiltrating  ductal carcinoma corresponds to architectural grade 3, nuclear grade 2,  mitotic grade 1; overall grade 6/9-moderately differentiated; grade 2. Breast Cancer Marker Studies:  Estrogen Receptors (ER): Positive (80% nuclei staining). Staining intensity = strong. Progesterone Receptors (IN): Positive (5% nuclei staining). Staining intensity = weak. Her-2/dougie (c-erb B-2) protein expression:  Positive (3+). She had bilateral Breast MRI done on 4/4/2019 revealing: There is scattered fibroglandular tissue with minimal background  parenchymal enhancement. An irregular, enhancing mass is noted in the  right breast 10:00 which measures 9 x 6 x 7 mm (image 43 of the axial  T1 postcontrast series). No suspicious mass or non-mass enhancement  seen on the left. There is no lymphadenopathy. Bilateral skin and  nipple areolar complexes are normal. Visualized portions of the chest  and abdomen are unremarkable. Impression  1. Irregular, enhancing mass in the right breast 10:00 is consistent  with biopsy-proven neoplasm. No additional foci of disease identified  in the right breast.  2. There is no lymphadenopathy. 3. No evidence of neoplasm on the left. RECOMMENDATION:  Continued surgical and oncologic consultation is advised. BIRADS 6: Known neoplasm    The patient underwent on 4/18/2019 a right breast needle localized lumpectomy, with SLN excisional biopsy, path:  CANCER CASE SUMMARY  Procedure: Excision (less than total mastectomy; needle localization  partial mastectomy [lumpectomy]). Specimen laterality: Right. Tumor site: Not specified. Tumor size: 0.2 cm (invasive component). Histologic type: Invasive ductal carcinoma. Histologic grade: Glandular/tubular differentiation-score = 3; nuclear  pleomorphism-score = 2; mitotic rate-score = 1.  Overall grade 2;  6/9-moderately differentiated. Tumor focality: Single focus of invasive carcinoma. Ductal carcinoma in situ: Present. Size/extent of DCIS: 0.7 cm.  DCIS completely surrounds a small, residual  focus of infiltrating ductal carcinoma in the current specimen. Architectural pattern: Comedo type. Nuclear grade: Grade III (high nuclear grade). Necrosis: Present, Central (expansive \"comedo\" necrosis). Microcalcifications: Present. Margins: Invasive carcinoma margins-uninvolved by invasive carcinoma. Distance from closest margin-0.9 cm (inferior inked margin). DCIS margins-uninvolved by DCIS.  Distance from closest margin-0.3 cm  (anterior inked margin of resection-specimen A). Note: Specimen C  represents additional anterior/superior breast tissue [3.0 x 2.5 x 1.0  cm] which represents larger final margin of resection for DCIS with  regard to anterior margin of resection). Regional lymph nodes: Uninvolved by tumor cells.  Number of lymph nodes  examined-4 (designated sentinel lymph nodes).  Number of lymph nodes  involved-0.   Treatment effect: No known presurgical therapy. Pathologic stage: pT1a-0.2 cm maximum dimension. Regional lymph nodes: (sn) pN0-no regional lymph node metastasis. Distant metastasis: pMX-cannot be assessed. Ancillary studies: Performed on the patient's previous biopsy specimen  (AEX-; 3/29/2019). Microcalcifications: Present in DCIS. Additional pathologic findings: Fibrocystic change.  Previous  biopsy/excision site. She recovered well from the surgery, she has no complaints. Review of Systems;  CONSTITUTIONAL: No fever, chills. Good appetite and energy level. ENMT: Eyes: No diplopia; Nose: No epistaxis. Mouth: No sore throat. RESPIRATORY: No hemoptysis, shortness of breath, cough. CARDIOVASCULAR: No chest pain, palpitations. GASTROINTESTINAL: No nausea/vomiting, abdominal pain, diarrhea/constipation. GENITOURINARY: No dysuria, urinary frequency, hematuria. NEURO: No syncope, presyncope, headache. MSK: she has chronic pain in the back and right hip. Remainder:  ROS NEGATIVE    Past Medical History:      Diagnosis Date    Cancer [facility])     malignant lesion in Right breast - for OR 4-18-19     Hyperlipidemia     no medications      Patient Active Problem List   Diagnosis    Infiltrating ductal carcinoma of right breast, stage 1 (HCC)        Past Surgical History:      Procedure Laterality Date    BREAST BIOPSY      BREAST BIOPSY Right 4/18/2019    RIGHT PARTIAL MASTECTOMY WITH NEEDLE LOCALIZATION AND SENTINEL NODE performed by Bessy Porter MD at [facility] AND CURETTAGE OF UTERUS      HYSTERECTOMY         Family History:  Family History   Problem Relation Age of Onset    Breast Cancer Mother     Cancer Father         esophageal, stomach    Cancer Paternal Grandfather         colon       Medications:  Reviewed and reconciled.     Social History:  Social History     Socioeconomic History    Marital status:      Spouse name: Not on file    Number of children: Not on file    Years of education: Not on file    Highest education level: Not on file   Occupational History    Not on file   Social Needs    Financial resource strain: Not on file    Food insecurity:     Worry: Not on file     Inability: Not on file    Transportation needs:     Medical: Not on file     Non-medical: Not on file   Tobacco Use    Smoking status: Never Smoker    Smokeless tobacco: Never Used   Substance and Sexual Activity    Alcohol use: Not Currently    Drug use: Never    Sexual activity: Yes     Partners: Male   Lifestyle    Physical activity:     Days per week: Not on file     Minutes per session: Not on file    Stress: Not on file   Relationships    Social connections:     Talks on phone: Not on file     Gets together: Not on file     Attends Caodaism service: Not on file     Active member of club or organization: Not on file     Attends meetings of clubs or organizations: Not on file     Relationship status: Not on file    Intimate partner violence:     Fear of current or ex partner: Not on file     Emotionally abused: Not on file     Physically abused: Not on file     Forced sexual activity: Not on file   Other Topics Concern    Not on file   Social History Narrative    Not on file       Allergies:  No Known Allergies     OB/GYN:  The patient is , she had her menarche at the age of 9-12, she had a hysterectomy, no oophorectomies, 1st live birth was at 20, she used OCPs for about 10 years. Physical Exam:  /78 (Site: Right Upper Arm, Position: Sitting, Cuff Size: Medium Adult)   Pulse 75   Temp 97.9 °F (36.6 °C) (Temporal)   Resp 20   Ht 5' 3\" (1.6 m)   Wt 130 lb 8 oz (59.2 kg)   BMI 23.12 kg/m²   GENERAL: Alert, oriented x 3, not in acute distress. HEENT: PERRLA; EOMI. Oropharynx clear. NECK: Supple. No palpable cervical or supraclavicular lymphadenopathy. LUNGS: Good air entry bilaterally. No wheezing, crackles or rhonchi.    BREASTS: The left breast exam is negative for any skin changes, no nipple discharge, no palpable masses, no palpable left axillary lymphadenopathy, the right breast exam is negative for an incision from her recent lumpectomy, it is helaing nicely,  no palpable masses, no skin erythema, no palpable right axillary lymphadenopathy. CARDIOVASCULAR: Regular rate. No murmurs, rubs or gallops. ABDOMEN: Soft. Non-tender, non-distended. Positive bowel sounds. EXTREMITIES: Without clubbing, cyanosis, or edema. NEUROLOGIC: No focal deficits. ECOG PS 0    Impression/Plan:      The patient is a 50-year-old postmenopausal lady with a PMH significant for hyperlipidemia, who had presented with an abnormal screening Mammogram, she was diagnosed with a right breast invasive ductal carcinoma, grade 2, measuring 9 mm, ER positive, 80% AL +5% HER-2/dougie positive, 3+ by IHC. Patient with clinical stage [id number] disease, she underwent on 4/18/2019 a right breast needle localized lumpectomy, with SLN excisional biopsy, tumor size is 2mm, grade 2, with associated DCIS, measuring 7mm, high grade, comedo type with central necrosis, margins are negative, 4 SN were removed, they were all neg for metastatic disease, final pathologic stage pT1a(sn)pN0M0, prognostic stage IA disease. I discussed with the patient and her spouse pathology results, stage and prognosis, adjuvant RT and adjuvant ET with an AI are recommended.  The patient has a small Her2 pos disease (<5mm), I reviewed with her the nccn guidelines and the risks of the  Chemotherapy, she is young and healthy, she is very nervous about the diagnosis of breast cancer, she is very well informed about her disease, she prefers to be treated, [address] was originally planned, but given the size of the tumor (<5mm), would treat with Taxol (weekly for 12 weeks) with herceptin, the side effects of the treatment including were discussed with the patient, she will have a port placed with Dr. Lavelle Pablo, chemo teach, 2D echo has been done LVEF is adequate for treatment with Herceptin. Patient with a personal history of breast cancer, family history is positive for breast and ovarian cancer, testing for HBOC is recommended, MyRisk testing was done, neg for clinically significant mutations. RTC with chemo start. Thank you for allowing us to participate in the care of Mrs. Quintanilla.     Roberta Blas MD   HEMATOLOGY/MEDICAL ONCOLOGY  [facility] [address] MED ONCOLOGY  [address] [place] [id number]  Dept: [phone]

## 2019-05-14 ENCOUNTER — OFFICE VISIT (OUTPATIENT)
Dept: ONCOLOGY | Age: 54
End: 2019-05-14
Payer: COMMERCIAL

## 2019-05-14 DIAGNOSIS — T45.1X5A CHEMOTHERAPY-INDUCED NAUSEA AND VOMITING: ICD-10-CM

## 2019-05-14 DIAGNOSIS — C50.911 INFILTRATING DUCTAL CARCINOMA OF RIGHT BREAST, STAGE 1 (HCC): Primary | ICD-10-CM

## 2019-05-14 DIAGNOSIS — R11.2 CHEMOTHERAPY-INDUCED NAUSEA AND VOMITING: ICD-10-CM

## 2019-05-14 PROCEDURE — 99215 OFFICE O/P EST HI 40 MIN: CPT | Performed by: INTERNAL MEDICINE

## 2019-05-14 PROCEDURE — 99999 PR OFFICE/OUTPT VISIT,PROCEDURE ONLY: CPT | Performed by: INTERNAL MEDICINE

## 2019-05-14 RX ORDER — PROCHLORPERAZINE MALEATE 10 MG
10 TABLET ORAL EVERY 6 HOURS PRN
Qty: 120 TABLET | Refills: 1 | Status: SHIPPED
Start: 2019-05-14 | End: 2021-01-22

## 2019-05-14 RX ORDER — LIDOCAINE AND PRILOCAINE 25; 25 MG/G; MG/G
CREAM TOPICAL
Qty: 30 G | Refills: 5 | Status: SHIPPED | OUTPATIENT
Start: 2019-05-14 | End: 2019-11-26 | Stop reason: ALTCHOICE

## 2019-05-14 RX ORDER — ONDANSETRON 4 MG/1
4 TABLET, FILM COATED ORAL EVERY 8 HOURS PRN
Qty: 90 TABLET | Refills: 1 | Status: SHIPPED
Start: 2019-05-14 | End: 2021-01-22

## 2019-05-14 RX ORDER — DEXAMETHASONE 4 MG/1
4 TABLET ORAL 2 TIMES DAILY
Qty: 40 TABLET | Refills: 0 | Status: SHIPPED | OUTPATIENT
Start: 2019-05-14 | End: 2019-07-09 | Stop reason: SDUPTHER

## 2019-05-14 NOTE — PROGRESS NOTES
Spoke with patient about Adjuvant chemotherapy (Weekly Taxol x 12 weeks + Herceptin every 3 weeks for 1 year). We went over the protocol to be used, what to expect as far as side effects, and when to call with problems. This was intended to reinforce the education done by Dr. Yogesh Harris. Patient was given an [address] provided by [employer]. Patient was provide lab requisitions for blood work prior to chemotherapy, Rx for cranial prothsis, and sent in the following prescriptions for patient to have prior to start of chemotherapy - compazine, dexamethasone, zofran, emla cream for port access for treatment days. Patient was provided medication and dietary handouts to the patient to take home and told patient to call if there are any questions once they had a chance to absorb the information. Patient had the opportunity to ask questions with all questions being answered to their satisfaction. The patient expresses understanding and acceptance of instructions.     Electronically signed by Teressa Abreu, [address] [place] [address] on 5/14/2019 at 2:43 PM      \

## 2019-05-15 ENCOUNTER — OFFICE VISIT (OUTPATIENT)
Dept: VASCULAR SURGERY | Age: 54
End: 2019-05-15
Payer: COMMERCIAL

## 2019-05-15 VITALS — DIASTOLIC BLOOD PRESSURE: 70 MMHG | SYSTOLIC BLOOD PRESSURE: 102 MMHG | HEART RATE: 70 BPM

## 2019-05-15 DIAGNOSIS — C50.911 INFILTRATING DUCTAL CARCINOMA OF RIGHT BREAST, STAGE 1 (HCC): Primary | ICD-10-CM

## 2019-05-15 PROCEDURE — 99204 OFFICE O/P NEW MOD 45 MIN: CPT | Performed by: SURGERY

## 2019-05-15 NOTE — PROGRESS NOTES
[address] PRE-ADMISSION TESTING GENERAL INSTRUCTIONS- [facility]-phone number:[phone]    GENERAL INSTRUCTIONS  [x] Antibacterial Soap shower Night before and/or AM of Surgery  [] Ron wipe instruction sheet and wipes given. [x] Nothing by mouth after midnight, including gum, candy, mints, or water. [x] You may brush your teeth, gargle, but do NOT swallow water. []Hibiclens shower  the night before and the morning of surgery. Do not use             Hibiclens on your face or head. []No smoking, chewing tobacco, illegal drugs, or alcohol within 24 hours of your surgery. [] Jewelry, valuables or body piercing's should not be brought to the hospital. All body and/or tongue piercing's must be removed prior to arriving to hospital.  ALL hair pins must be removed. [] Do not wear makeup, lotions, powders, deodorant. Nail polish as directed by the nurse. [] Arrange transportation with a responsible adult  to and from the hospital. If you do not have a responsible adult  to transport you, you will need to make arrangements with a medical transportation company (i.e. [employer]. A Uber/taxi/bus is not appropriate unless you are accompanied by a responsible adult ). Arrange for someone to be with you for the remainder of the day and for 24 hours after your procedure due to having had anesthesia. Who will be your  for transportation?__________________   Who will be staying with you for 24 hrs after your procedure?__________________  [] Bring insurance card and photo ID.  [] Transfusion Bracelet: Please bring with you to hospital, day of surgery  [] Bring urine specimen day of surgery. Any small container is acceptable. [] Use inhalers the morning of surgery and bring with you to hospital.  [] Bring copy of living will or healthcare power of  papers to be placed in your electronic record.   [] CPAP/BI-PAP: Please bring your machine if you are to spend

## 2019-05-15 NOTE — PROGRESS NOTES
Chief Complaint:   Chief Complaint   Patient presents with   [address] insertion, breast cancer. HPI:  Patient came to the office, with her , for preoperative discussion prior to prevent of venous port recommended by her oncologist Dr. Theo Ashby, for carcinoma of the right breast, post lumpectomy, with positive lymph nodes, surgery done at [facility] Center by Cesar Charlton    Patient is doing well otherwise and has no major health issues      Patient denies any focal lateralizing neurological symptoms like loss of speech, vision or loss of function of extremity    Patient can walk a few blocks , and denies any symptoms of rest pain    No Known Allergies    Current Outpatient Medications   Medication Sig Dispense Refill    dexamethasone (DECADRON) 4 MG tablet Take 1 tablet by mouth 2 times daily Take for 3 days, Starting the day before chemotherapy, the day of and day after chemotherapy. 40 tablet 0    prochlorperazine (COMPAZINE) 10 MG tablet Take 1 tablet by mouth every 6 hours as needed (nausea) 120 tablet 1    lidocaine-prilocaine (EMLA) 2.5-2.5 % cream Apply 1 hour before your scheduled chemotherapy,cream will last up to 3 hours. Squeeze a small amount,the size of a quarter onto your port. 30 g 5    ondansetron (ZOFRAN) 4 MG tablet Take 1 tablet by mouth every 8 hours as needed for Nausea or Vomiting 90 tablet 1     No current facility-administered medications for this visit.         Past Medical History:   Diagnosis Date    Cancer [facility])     malignant lesion in Right breast - for OR 4-18-19     Hyperlipidemia     no medications        Past Surgical History:   Procedure Laterality Date    BREAST BIOPSY      BREAST BIOPSY Right 4/18/2019    RIGHT PARTIAL MASTECTOMY WITH NEEDLE LOCALIZATION AND SENTINEL NODE performed by Elidia Aguilar MD at [address]      HYSTERECTOMY         Family History   Problem Relation Age of Onset    Breast Cancer Mother     Cancer Father         esophageal, stomach    Cancer Paternal Grandfather         colon       Social History     Socioeconomic History    Marital status:      Spouse name: Not on file    Number of children: Not on file    Years of education: Not on file    Highest education level: Not on file   Occupational History    Not on file   Social Needs    Financial resource strain: Not on file    Food insecurity:     Worry: Not on file     Inability: Not on file    Transportation needs:     Medical: Not on file     Non-medical: Not on file   Tobacco Use    Smoking status: Never Smoker    Smokeless tobacco: Never Used   Substance and Sexual Activity    Alcohol use: Not Currently    Drug use: Never    Sexual activity: Yes     Partners: Male   Lifestyle    Physical activity:     Days per week: Not on file     Minutes per session: Not on file    Stress: Not on file   Relationships    Social connections:     Talks on phone: Not on file     Gets together: Not on file     Attends Sikhism service: Not on file     Active member of club or organization: Not on file     Attends meetings of clubs or organizations: Not on file     Relationship status: Not on file    Intimate partner violence:     Fear of current or ex partner: Not on file     Emotionally abused: Not on file     Physically abused: Not on file     Forced sexual activity: Not on file   Other Topics Concern    Not on file   Social History Narrative    Not on file       Review of Systems:  Skin:  No abnormal pigmentation or rash  Eyes:  No blurring, diplopia or vision loss  Ears/Nose/Throat:  No hearing loss or vertigo  Respiratory:  No cough, pleuritic chest pain, dyspnea, or wheezing. Cardiovascular: No angina, palpitations . Gastrointestinal:  No nausea or vomiting; no abdominal pain or rectal bleeding  Musculoskeletal:  No arthritis or weakness.   Neurologic:  No paralysis, paresis, paresthesia, seizures or surgery. All  questions were answered        Indicated follow-up: Return if symptoms worsen or fail to improve.           CC to Dr.El Chapman and Anita Collins

## 2019-05-16 ENCOUNTER — TELEPHONE (OUTPATIENT)
Dept: INFUSION THERAPY | Age: 54
End: 2019-05-16

## 2019-05-16 ENCOUNTER — PREP FOR PROCEDURE (OUTPATIENT)
Dept: VASCULAR SURGERY | Age: 54
End: 2019-05-16

## 2019-05-16 RX ORDER — SODIUM CHLORIDE 9 MG/ML
INJECTION, SOLUTION INTRAVENOUS CONTINUOUS
Status: CANCELLED | OUTPATIENT
Start: 2019-05-16

## 2019-05-16 RX ORDER — SODIUM CHLORIDE 0.9 % (FLUSH) 0.9 %
10 SYRINGE (ML) INJECTION PRN
Status: CANCELLED | OUTPATIENT
Start: 2019-05-16

## 2019-05-16 RX ORDER — SODIUM CHLORIDE 0.9 % (FLUSH) 0.9 %
10 SYRINGE (ML) INJECTION EVERY 12 HOURS SCHEDULED
Status: CANCELLED | OUTPATIENT
Start: 2019-05-16

## 2019-05-16 NOTE — TELEPHONE ENCOUNTER
Called [facility] prior authorization [id number] to check on an authorization request submitted on 5-10-19 and with clinical information faxed on 5-13-19. Per Dara Guadalupe, \"This request is still in progress with the . There is no further information available at this time. \" Voiced understanding

## 2019-05-17 ENCOUNTER — ANESTHESIA EVENT (OUTPATIENT)
Dept: OPERATING ROOM | Age: 54
End: 2019-05-17
Payer: COMMERCIAL

## 2019-05-17 ENCOUNTER — APPOINTMENT (OUTPATIENT)
Dept: GENERAL RADIOLOGY | Age: 54
End: 2019-05-17
Attending: SURGERY
Payer: COMMERCIAL

## 2019-05-17 ENCOUNTER — HOSPITAL ENCOUNTER (OUTPATIENT)
Age: 54
Setting detail: OUTPATIENT SURGERY
Discharge: HOME | End: 2019-05-17
Attending: SURGERY | Admitting: SURGERY
Payer: COMMERCIAL

## 2019-05-17 ENCOUNTER — ANESTHESIA (OUTPATIENT)
Dept: OPERATING ROOM | Age: 54
End: 2019-05-17
Payer: COMMERCIAL

## 2019-05-17 VITALS — SYSTOLIC BLOOD PRESSURE: 90 MMHG | DIASTOLIC BLOOD PRESSURE: 54 MMHG | OXYGEN SATURATION: 100 %

## 2019-05-17 VITALS
HEIGHT: 63 IN | SYSTOLIC BLOOD PRESSURE: 133 MMHG | RESPIRATION RATE: 16 BRPM | OXYGEN SATURATION: 99 % | DIASTOLIC BLOOD PRESSURE: 63 MMHG | BODY MASS INDEX: 23.04 KG/M2 | HEART RATE: 56 BPM | TEMPERATURE: 98.5 F | WEIGHT: 130 LBS

## 2019-05-17 DIAGNOSIS — C50.911 INFILTRATING DUCTAL CARCINOMA OF RIGHT BREAST, STAGE 1 (HCC): Primary | ICD-10-CM

## 2019-05-17 LAB
ANION GAP SERPL CALCULATED.3IONS-SCNC: 7 MMOL/L (ref 7–16)
APTT: 29 SEC (ref 24.5–35.1)
BUN BLDV-MCNC: 10 MG/DL (ref 6–20)
CALCIUM SERPL-MCNC: 9.3 MG/DL (ref 8.6–10.2)
CHLORIDE BLD-SCNC: 107 MMOL/L (ref 98–107)
CO2: 30 MMOL/L (ref 22–29)
CREAT SERPL-MCNC: 0.6 MG/DL (ref 0.5–1)
GFR AFRICAN AMERICAN: >60
GFR NON-AFRICAN AMERICAN: >60 ML/MIN/1.73
GLUCOSE BLD-MCNC: 88 MG/DL (ref 74–99)
HCT VFR BLD CALC: 41.6 % (ref 34–48)
HEMOGLOBIN: 13.1 G/DL (ref 11.5–15.5)
INR BLD: 1
MCH RBC QN AUTO: 30.7 PG (ref 26–35)
MCHC RBC AUTO-ENTMCNC: 31.5 % (ref 32–34.5)
MCV RBC AUTO: 97.4 FL (ref 80–99.9)
PDW BLD-RTO: 13.6 FL (ref 11.5–15)
PLATELET # BLD: 303 E9/L (ref 130–450)
PMV BLD AUTO: 9.7 FL (ref 7–12)
POTASSIUM REFLEX MAGNESIUM: 4.2 MMOL/L (ref 3.5–5)
PROTHROMBIN TIME: 11.1 SEC (ref 9.3–12.4)
RBC # BLD: 4.27 E12/L (ref 3.5–5.5)
SODIUM BLD-SCNC: 144 MMOL/L (ref 132–146)
WBC # BLD: 5 E9/L (ref 4.5–11.5)

## 2019-05-17 PROCEDURE — 85610 PROTHROMBIN TIME: CPT

## 2019-05-17 PROCEDURE — 36561 INSERT TUNNELED CV CATH: CPT | Performed by: SURGERY

## 2019-05-17 PROCEDURE — 85730 THROMBOPLASTIN TIME PARTIAL: CPT

## 2019-05-17 PROCEDURE — 3209999900 FLUORO FOR SURGICAL PROCEDURES

## 2019-05-17 PROCEDURE — 2500000003 HC RX 250 WO HCPCS: Performed by: SURGERY

## 2019-05-17 PROCEDURE — 85027 COMPLETE CBC AUTOMATED: CPT

## 2019-05-17 PROCEDURE — 2580000003 HC RX 258: Performed by: SURGERY

## 2019-05-17 PROCEDURE — 3600000013 HC SURGERY LEVEL 3 ADDTL 15MIN: Performed by: SURGERY

## 2019-05-17 PROCEDURE — 6360000002 HC RX W HCPCS: Performed by: SURGERY

## 2019-05-17 PROCEDURE — 3700000000 HC ANESTHESIA ATTENDED CARE: Performed by: SURGERY

## 2019-05-17 PROCEDURE — 77001 FLUOROGUIDE FOR VEIN DEVICE: CPT | Performed by: SURGERY

## 2019-05-17 PROCEDURE — 36415 COLL VENOUS BLD VENIPUNCTURE: CPT

## 2019-05-17 PROCEDURE — 6360000002 HC RX W HCPCS

## 2019-05-17 PROCEDURE — 2709999900 HC NON-CHARGEABLE SUPPLY: Performed by: SURGERY

## 2019-05-17 PROCEDURE — C1788 PORT, INDWELLING, IMP: HCPCS | Performed by: SURGERY

## 2019-05-17 PROCEDURE — 3600000003 HC SURGERY LEVEL 3 BASE: Performed by: SURGERY

## 2019-05-17 PROCEDURE — 6360000004 HC RX CONTRAST MEDICATION: Performed by: SURGERY

## 2019-05-17 PROCEDURE — 3700000001 HC ADD 15 MINUTES (ANESTHESIA): Performed by: SURGERY

## 2019-05-17 PROCEDURE — 7100000011 HC PHASE II RECOVERY - ADDTL 15 MIN: Performed by: SURGERY

## 2019-05-17 PROCEDURE — 7100000010 HC PHASE II RECOVERY - FIRST 15 MIN: Performed by: SURGERY

## 2019-05-17 PROCEDURE — 80048 BASIC METABOLIC PNL TOTAL CA: CPT

## 2019-05-17 DEVICE — PORT SMARTPORT 8FR CT TI W/VLV SHTH: Type: IMPLANTABLE DEVICE | Site: CHEST  WALL | Status: FUNCTIONAL

## 2019-05-17 RX ORDER — SODIUM CHLORIDE 9 MG/ML
INJECTION, SOLUTION INTRAVENOUS CONTINUOUS
Status: DISCONTINUED | OUTPATIENT
Start: 2019-05-17 | End: 2019-05-17 | Stop reason: HOSPADM

## 2019-05-17 RX ORDER — MIDAZOLAM HYDROCHLORIDE 1 MG/ML
INJECTION INTRAMUSCULAR; INTRAVENOUS PRN
Status: DISCONTINUED | OUTPATIENT
Start: 2019-05-17 | End: 2019-05-17 | Stop reason: SDUPTHER

## 2019-05-17 RX ORDER — PROPOFOL 10 MG/ML
INJECTION, EMULSION INTRAVENOUS CONTINUOUS PRN
Status: DISCONTINUED | OUTPATIENT
Start: 2019-05-17 | End: 2019-05-17 | Stop reason: SDUPTHER

## 2019-05-17 RX ORDER — OXYCODONE HYDROCHLORIDE AND ACETAMINOPHEN 5; 325 MG/1; MG/1
1 TABLET ORAL EVERY 6 HOURS PRN
Qty: 28 TABLET | Refills: 0 | Status: SHIPPED | OUTPATIENT
Start: 2019-05-17 | End: 2019-05-20

## 2019-05-17 RX ORDER — FENTANYL CITRATE 50 UG/ML
INJECTION, SOLUTION INTRAMUSCULAR; INTRAVENOUS PRN
Status: DISCONTINUED | OUTPATIENT
Start: 2019-05-17 | End: 2019-05-17 | Stop reason: SDUPTHER

## 2019-05-17 RX ORDER — SODIUM CHLORIDE 0.9 % (FLUSH) 0.9 %
10 SYRINGE (ML) INJECTION EVERY 12 HOURS SCHEDULED
Status: DISCONTINUED | OUTPATIENT
Start: 2019-05-17 | End: 2019-05-17 | Stop reason: HOSPADM

## 2019-05-17 RX ORDER — SODIUM CHLORIDE 0.9 % (FLUSH) 0.9 %
10 SYRINGE (ML) INJECTION PRN
Status: DISCONTINUED | OUTPATIENT
Start: 2019-05-17 | End: 2019-05-17 | Stop reason: HOSPADM

## 2019-05-17 RX ORDER — LIDOCAINE HYDROCHLORIDE 10 MG/ML
INJECTION, SOLUTION INFILTRATION; PERINEURAL PRN
Status: DISCONTINUED | OUTPATIENT
Start: 2019-05-17 | End: 2019-05-17 | Stop reason: ALTCHOICE

## 2019-05-17 RX ORDER — CEFAZOLIN SODIUM 2 G/50ML
2 SOLUTION INTRAVENOUS
Status: COMPLETED | OUTPATIENT
Start: 2019-05-17 | End: 2019-05-17

## 2019-05-17 RX ORDER — HEPARIN SODIUM (PORCINE) LOCK FLUSH IV SOLN 100 UNIT/ML 100 UNIT/ML
SOLUTION INTRAVENOUS PRN
Status: DISCONTINUED | OUTPATIENT
Start: 2019-05-17 | End: 2019-05-17 | Stop reason: ALTCHOICE

## 2019-05-17 RX ADMIN — PHENYLEPHRINE HYDROCHLORIDE 50 MCG: 10 INJECTION INTRAVENOUS at 09:41

## 2019-05-17 RX ADMIN — SODIUM CHLORIDE: 9 INJECTION, SOLUTION INTRAVENOUS at 09:12

## 2019-05-17 RX ADMIN — CEFAZOLIN SODIUM 2 G: 2 SOLUTION INTRAVENOUS at 09:21

## 2019-05-17 RX ADMIN — MIDAZOLAM HYDROCHLORIDE 1 MG: 1 INJECTION, SOLUTION INTRAMUSCULAR; INTRAVENOUS at 09:13

## 2019-05-17 RX ADMIN — FENTANYL CITRATE 50 MCG: 50 INJECTION, SOLUTION INTRAMUSCULAR; INTRAVENOUS at 09:17

## 2019-05-17 RX ADMIN — MIDAZOLAM HYDROCHLORIDE 1 MG: 1 INJECTION, SOLUTION INTRAMUSCULAR; INTRAVENOUS at 09:17

## 2019-05-17 RX ADMIN — FENTANYL CITRATE 50 MCG: 50 INJECTION, SOLUTION INTRAMUSCULAR; INTRAVENOUS at 09:25

## 2019-05-17 RX ADMIN — PHENYLEPHRINE HYDROCHLORIDE 100 MCG: 10 INJECTION INTRAVENOUS at 09:47

## 2019-05-17 RX ADMIN — PROPOFOL 50 MCG/KG/MIN: 10 INJECTION, EMULSION INTRAVENOUS at 09:17

## 2019-05-17 RX ADMIN — SODIUM CHLORIDE: 9 INJECTION, SOLUTION INTRAVENOUS at 07:07

## 2019-05-17 RX ADMIN — PHENYLEPHRINE HYDROCHLORIDE 50 MCG: 10 INJECTION INTRAVENOUS at 09:32

## 2019-05-17 ASSESSMENT — PAIN DESCRIPTION - DESCRIPTORS
DESCRIPTORS: ACHING;DISCOMFORT

## 2019-05-17 ASSESSMENT — PAIN SCALES - GENERAL
PAINLEVEL_OUTOF10: 3
PAINLEVEL_OUTOF10: 0

## 2019-05-17 ASSESSMENT — PAIN - FUNCTIONAL ASSESSMENT
PAIN_FUNCTIONAL_ASSESSMENT: ACTIVITIES ARE NOT PREVENTED
PAIN_FUNCTIONAL_ASSESSMENT: 0-10

## 2019-05-17 ASSESSMENT — PAIN DESCRIPTION - LOCATION
LOCATION: CHEST

## 2019-05-17 ASSESSMENT — PAIN DESCRIPTION - FREQUENCY
FREQUENCY: CONTINUOUS

## 2019-05-17 ASSESSMENT — PAIN DESCRIPTION - ORIENTATION
ORIENTATION: RIGHT;UPPER

## 2019-05-17 NOTE — H&P
at [address] OF UTERUS        HYSTERECTOMY                Family History         Family History   Problem Relation Age of Onset    Breast Cancer Mother      Cancer Father           esophageal, stomach    Cancer Paternal Grandfather           colon            Social History               Socioeconomic History    Marital status:        Spouse name: Not on file    Number of children: Not on file    Years of education: Not on file    Highest education level: Not on file   Occupational History    Not on file   Social Needs    Financial resource strain: Not on file    Food insecurity:       Worry: Not on file       Inability: Not on file    Transportation needs:       Medical: Not on file       Non-medical: Not on file   Tobacco Use    Smoking status: Never Smoker    Smokeless tobacco: Never Used   Substance and Sexual Activity    Alcohol use: Not Currently    Drug use: Never    Sexual activity: Yes       Partners: Male   Lifestyle    Physical activity:       Days per week: Not on file       Minutes per session: Not on file    Stress: Not on file   Relationships    Social connections:       Talks on phone: Not on file       Gets together: Not on file       Attends Yazidi service: Not on file       Active member of club or organization: Not on file       Attends meetings of clubs or organizations: Not on file       Relationship status: Not on file    Intimate partner violence:       Fear of current or ex partner: Not on file       Emotionally abused: Not on file       Physically abused: Not on file       Forced sexual activity: Not on file   Other Topics Concern    Not on file   Social History Narrative    Not on file            Review of Systems:  Skin:  No abnormal pigmentation or rash  Eyes:  No blurring, diplopia or vision loss  Ears/Nose/Throat:  No hearing loss or vertigo  Respiratory:  No cough, pleuritic chest pain, dyspnea, or wheezing.   Cardiovascular: No angina, palpitations . Gastrointestinal:  No nausea or vomiting; no abdominal pain or rectal bleeding  Musculoskeletal:  No arthritis or weakness. Neurologic:  No paralysis, paresis, paresthesia, seizures or headaches  Hematologic/Lymphatic/Immunologic:  No anemia, abnormal bleeding/bruising, fever, chills or night sweats. Endocrine:  No heat or cold intolerance. No polyphagia, polydipsia or polyuria.        Physical Exam:  General appearance:  Alert, awake, oriented x 3. No distress. Skin:  Warm and dry  Head:  Normocephalic. No masses, lesions or tenderness  Eyes:  Conjunctivae appear normal; PERRL  Ears:  External ears normal  Nose/Sinuses:  Septum midline, mucosa normal; no drainage  Oropharynx:  Clear, no exudate noted  Neck:  No jugular venous distention, lymphadenopathy or thyromegaly. No evidence of carotid bruit  Lungs:  Clear to ausculation bilaterally. No rhonchi, crackles, wheezes  Heart:  Regular rate and rhythm. No rub or murmur  Abdomen:  Soft, non-tender. No masses, organomegaly. Musculoskeletal : No joint effusions, tenderness swelling    Neuro: Speech is intact. Moving all extremities. No focal motor or sensory deficits        Extremities:  Both feet are warm to touch. The color of both feet is normal.           Pulses Right  Left     Brachial 3 3     Radial      3=normal   Femoral 2 2  2=diminished   Popliteal      1=barely palpable   Dorsalis pedis      0=absent   Posterior tibial      4=aneurysmal                  Other pertinent information:1. The past medical records were reviewed.     2. The medical records of her oncologist's office were reviewed     Assessment:     1.  Infiltrating ductal carcinoma of right breast, stage 1 (HCC)                   Plan:          Discussed with the patient and her  regarding all options, risks benefits and alternatives, patient was recommended to proceed with insertion of the venous port as instructed by her oncologist Dr.El Chapman     The risks, benefits, options and alternatives were clearly explained including bleeding, clotting, infection, arterial, venous, nerve, cardiopulmonary complications, up to 2% chance of major complications which they understand and consent for surgery.  All  questions were answered      Pinky Finders        Addendum:    No changes noted in the history and physical examination the patient since my last last evaluation of the patient    Pinky Finders

## 2019-05-17 NOTE — ANESTHESIA PRE PROCEDURE
for OR 4-18-19     Hyperlipidemia     no medications        Past Surgical History:        Procedure Laterality Date    BREAST BIOPSY      BREAST BIOPSY Right 4/18/2019    RIGHT PARTIAL MASTECTOMY WITH NEEDLE LOCALIZATION AND SENTINEL NODE performed by Moe Montez MD at [id number] [place] [address]         Social History:    Social History     Tobacco Use    Smoking status: Never Smoker    Smokeless tobacco: Never Used   Substance Use Topics    Alcohol use: Not Currently                                Counseling given: Not Answered      Vital Signs (Current):   Vitals:    05/17/19 0643   BP: (!) 123/57   Pulse: 60   Resp: 16   Temp: 97.8 °F (36.6 °C)   TempSrc: Temporal   SpO2: 100%   Weight: 130 lb (59 kg)   Height: 5' 3\" (1.6 m)                                              BP Readings from Last 3 Encounters:   05/17/19 (!) 123/57   05/15/19 102/70   05/07/19 137/78       NPO Status: Time of last liquid consumption: 2030                        Time of last solid consumption: 2030                        Date of last liquid consumption: 05/16/19                        Date of last solid food consumption: 05/16/19    BMI:   Wt Readings from Last 3 Encounters:   05/17/19 130 lb (59 kg)   05/07/19 130 lb 8 oz (59.2 kg)   04/18/19 128 lb (58.1 kg)     Body mass index is 23.03 kg/m². CBC:   Lab Results   Component Value Date    WBC 5.0 05/17/2019    RBC 4.27 05/17/2019    HGB 13.1 05/17/2019    HCT 41.6 05/17/2019    MCV 97.4 05/17/2019    RDW 13.6 05/17/2019     05/17/2019       CMP: No results found for: NA, K, CL, CO2, BUN, CREATININE, GFRAA, AGRATIO, LABGLOM, GLUCOSE, PROT, CALCIUM, BILITOT, ALKPHOS, AST, ALT    POC Tests: No results for input(s): POCGLU, POCNA, POCK, POCCL, POCBUN, POCHEMO, POCHCT in the last 72 hours.     Coags: No results found for: PROTIME, INR, APTT    HCG (If Applicable): No results found for: PREGTESTUR, PREGSERUM, HCG, HCGQUANT     ABGs: No results found for: PHART, PO2ART, JJY2DBM, RBJ6GIU, BEART, F6HRTTRL     Type & Screen (If Applicable):  No results found for: LABABO, [address]    Anesthesia Evaluation  Patient summary reviewed and Nursing notes reviewed no history of anesthetic complications:   Airway: Mallampati: II  TM distance: >3 FB   Neck ROM: full  Mouth opening: > = 3 FB Dental: normal exam         Pulmonary:Negative Pulmonary ROS breath sounds clear to auscultation                             Cardiovascular:  Exercise tolerance: good (>4 METS),           Rhythm: regular  Rate: normal                    Neuro/Psych:   Negative Neuro/Psych ROS              GI/Hepatic/Renal: Neg GI/Hepatic/Renal ROS            Endo/Other:    (+) malignancy/cancer. ROS comment: Right breast cancer s/p resection with lymph nodes Abdominal:           Vascular: negative vascular ROS. Anesthesia Plan      MAC     ASA 3       Induction: intravenous. MIPS: Postoperative opioids intended and Prophylactic antiemetics administered. Anesthetic plan and risks discussed with patient. Plan discussed with CRNA. DOS STAFF ADDENDUM:    Patient seen and chart reviewed. Physical exam and history updated as indicated. NPO status confirmed. Anesthesia options and plan discussed including risks benefits with patient/legal guardian and family as available. Concerns and questions addressed. Consent verbalized to proceed.   Anesthesia plan, options and intraoperative/postoperative concerns discussed with care team.    Maury Arevalo MD  5/17/2019  7:19 AM          Maury Arevalo MD   5/17/2019

## 2019-05-20 ENCOUNTER — TELEPHONE (OUTPATIENT)
Dept: CASE MANAGEMENT | Age: 54
End: 2019-05-20

## 2019-05-20 ENCOUNTER — TELEPHONE (OUTPATIENT)
Dept: ONCOLOGY | Age: 54
End: 2019-05-20

## 2019-05-20 NOTE — TELEPHONE ENCOUNTER
Received patient's [facility] paperwork, she is requesting intermittent leave during her course of chemo therapy treatment. Forms completed and faxed to patient's employer at  [phone].

## 2019-05-20 NOTE — TELEPHONE ENCOUNTER
Called and spoke with Juwan Reynolds. At [facility]. Per Noreen Stark, the office has called multiple times attempting to obtain chemotherapy prior authorization. This case has been escalated with no decision at this time. Per Noreen Stark, she has [employer] again with my direct phone number to contact, and she also stated that if I do not heat anything by 1 PM today to please call back. I will update the office as soon as I hear from insurance.

## 2019-05-20 NOTE — TELEPHONE ENCOUNTER
Called and spoke with Vicki Swain regarding chemotherapy authorization. Per her insurance no decision has been rendered at this time. I informed RN , Mj Kumar of this and requested that the patient be rescheduled to the following week. Shilpa Magaña to notify front office staff of this change. I informed Vicki Swain to be expecting a phone call to reschedule. Vicki Wiliam verbalized understanding. I also instructed Vickigabriel Swain NOT to get her labs done today, but to wait until I call her later this week once I have the approval from her insurance company. Vicki Wiliam again, verbalized understanding. Vicki Wiliam further instructed that she may call the office or myself with any further questions.

## 2019-05-21 ENCOUNTER — HOSPITAL ENCOUNTER (OUTPATIENT)
Dept: INFUSION THERAPY | Age: 54
End: 2019-05-21

## 2019-05-23 ENCOUNTER — TELEPHONE (OUTPATIENT)
Dept: CASE MANAGEMENT | Age: 54
End: 2019-05-23

## 2019-05-23 ENCOUNTER — TELEPHONE (OUTPATIENT)
Dept: ONCOLOGY | Age: 54
End: 2019-05-23

## 2019-05-24 ENCOUNTER — TELEPHONE (OUTPATIENT)
Dept: ONCOLOGY | Age: 54
End: 2019-05-24

## 2019-05-24 NOTE — TELEPHONE ENCOUNTER
Called and updated Mary Blandonmarsha regarding prior authorization for chemotherapy from [employer]. I notified Mary Williamson that her insurance finally approved the chemotherapy, but unfortunately only approved 1 dose. I stated that I spoke to Ganesh Nguyen, the supervisor yesterday to rectify this error. I verbalized that she is now approved and confirmed appointment for May 28th at 0930. Mary Williamson verbalized understanding and was appreciative. Mary Williamson also stated that she would get her labs done the day before her appointment and bring results to the office on Tuesday.

## 2019-05-27 LAB
ABSOLUTE BASO #: 0.1 10*3/UL (ref 0–0.1)
ABSOLUTE EOS #: 0.1 10*3/UL (ref 0–0.4)
ABSOLUTE NEUT #: 4.8 10*3/UL (ref 2.3–7.9)
ALBUMIN: 3.8 GM/DL (ref 3.1–4.5)
ALP BLD-CCNC: 54 U/L (ref 45–117)
ALT SERPL-CCNC: 16 U/L (ref 12–78)
AST SERPL-CCNC: 15 IU/L (ref 3–35)
BASOPHILS %: 0.8 % (ref 0–1)
BILIRUB SERPL-MCNC: 0.3 MG/DL (ref 0.2–1)
BUN BLDV-MCNC: 14 MG/DL (ref 7–24)
CALCIUM SERPL-MCNC: 8.8 MG/DL (ref 8.5–10.5)
CHLORIDE BLD-SCNC: 108 MMOL/L (ref 98–107)
CO2: 28 MMOL/L (ref 21–32)
CREAT SERPL-MCNC: 0.72 MG/DL (ref 0.55–1.02)
EOSINOPHILS %: 1.8 % (ref 1–4)
GFR AFRICAN AMERICAN: > 60 ML/MIN
GFR SERPL CREATININE-BSD FRML MDRD: >60 ML/MIN/
GLUCOSE: 97 MG/DL (ref 65–99)
HCT VFR BLD CALC: 40.7 % (ref 37–47)
HEMOGLOBIN: 12.9 G/DL (ref 12–16)
IMMATURE GRANULOCYTES #: 0 10*3/UL (ref 0–0.1)
IMMATURE GRANULOCYTES: 0.1 % (ref 0–1)
LYMPHOCYTE %: 27.7 % (ref 27–41)
LYMPHOCYTES # BLD: 2.2 10*3/UL (ref 1.3–4.4)
MCH RBC QN AUTO: 31.5 PG (ref 27–31)
MCHC RBC AUTO-ENTMCNC: 31.7 G/DL (ref 33–37)
MCV RBC AUTO: 99.3 FL (ref 81–99)
MONOCYTES # BLD: 0.6 10*3/UL (ref 0.1–1)
MONOCYTES %: 8 % (ref 3–9)
NEUTROPHILS %: 61.6 % (ref 47–73)
NUCLEATED RED BLOOD CELLS: 0 % (ref 0–0)
PDW BLD-RTO: 13.2 % (ref 0–14.5)
PLATELET # BLD: 269 10*3/UL (ref 130–400)
PMV BLD AUTO: 10 FL (ref 9.6–12.3)
POTASSIUM SERPL-SCNC: 3.9 MMOL/L (ref 3.5–5.1)
RBC # BLD: 4.1 10*6/UL (ref 4.1–5.1)
SODIUM BLD-SCNC: 144 MMOL/L (ref 136–145)
TOTAL PROTEIN: 7.3 GM/DL (ref 6.4–8.2)
WBC # BLD: 7.8 10*3/UL (ref 4.8–10.8)

## 2019-05-28 ENCOUNTER — OFFICE VISIT (OUTPATIENT)
Dept: ONCOLOGY | Age: 54
End: 2019-05-28
Payer: COMMERCIAL

## 2019-05-28 ENCOUNTER — TELEPHONE (OUTPATIENT)
Dept: CASE MANAGEMENT | Age: 54
End: 2019-05-28

## 2019-05-28 ENCOUNTER — HOSPITAL ENCOUNTER (OUTPATIENT)
Dept: INFUSION THERAPY | Age: 54
Discharge: HOME | End: 2019-05-28
Payer: COMMERCIAL

## 2019-05-28 VITALS
HEIGHT: 63 IN | OXYGEN SATURATION: 100 % | HEART RATE: 78 BPM | DIASTOLIC BLOOD PRESSURE: 58 MMHG | RESPIRATION RATE: 14 BRPM | SYSTOLIC BLOOD PRESSURE: 118 MMHG | WEIGHT: 130 LBS | TEMPERATURE: 99.2 F | BODY MASS INDEX: 23.04 KG/M2

## 2019-05-28 VITALS
OXYGEN SATURATION: 99 % | TEMPERATURE: 99 F | RESPIRATION RATE: 16 BRPM | HEART RATE: 66 BPM | DIASTOLIC BLOOD PRESSURE: 49 MMHG | SYSTOLIC BLOOD PRESSURE: 97 MMHG

## 2019-05-28 DIAGNOSIS — C50.911 INFILTRATING DUCTAL CARCINOMA OF RIGHT BREAST, STAGE 1 (HCC): Primary | ICD-10-CM

## 2019-05-28 PROCEDURE — 96413 CHEMO IV INFUSION 1 HR: CPT

## 2019-05-28 PROCEDURE — 96415 CHEMO IV INFUSION ADDL HR: CPT

## 2019-05-28 PROCEDURE — 96375 TX/PRO/DX INJ NEW DRUG ADDON: CPT

## 2019-05-28 PROCEDURE — 2580000003 HC RX 258: Performed by: INTERNAL MEDICINE

## 2019-05-28 PROCEDURE — 6360000002 HC RX W HCPCS: Performed by: INTERNAL MEDICINE

## 2019-05-28 PROCEDURE — 96417 CHEMO IV INFUS EACH ADDL SEQ: CPT

## 2019-05-28 PROCEDURE — 99214 OFFICE O/P EST MOD 30 MIN: CPT | Performed by: INTERNAL MEDICINE

## 2019-05-28 PROCEDURE — 2500000003 HC RX 250 WO HCPCS: Performed by: INTERNAL MEDICINE

## 2019-05-28 RX ORDER — 0.9 % SODIUM CHLORIDE 0.9 %
10 VIAL (ML) INJECTION ONCE
Status: COMPLETED | OUTPATIENT
Start: 2019-05-28 | End: 2019-05-28

## 2019-05-28 RX ORDER — SODIUM CHLORIDE 0.9 % (FLUSH) 0.9 %
5 SYRINGE (ML) INJECTION PRN
Status: CANCELLED | OUTPATIENT
Start: 2019-05-28

## 2019-05-28 RX ORDER — DIPHENHYDRAMINE HYDROCHLORIDE 50 MG/ML
50 INJECTION INTRAMUSCULAR; INTRAVENOUS ONCE
Status: CANCELLED | OUTPATIENT
Start: 2019-05-28

## 2019-05-28 RX ORDER — SODIUM CHLORIDE 9 MG/ML
INJECTION, SOLUTION INTRAVENOUS CONTINUOUS
Status: CANCELLED | OUTPATIENT
Start: 2019-05-28

## 2019-05-28 RX ORDER — 0.9 % SODIUM CHLORIDE 0.9 %
10 VIAL (ML) INJECTION ONCE
Status: CANCELLED | OUTPATIENT
Start: 2019-05-28

## 2019-05-28 RX ORDER — HEPARIN SODIUM (PORCINE) LOCK FLUSH IV SOLN 100 UNIT/ML 100 UNIT/ML
500 SOLUTION INTRAVENOUS PRN
Status: CANCELLED | OUTPATIENT
Start: 2019-05-28

## 2019-05-28 RX ORDER — MEPERIDINE HYDROCHLORIDE 25 MG/ML
12.5 INJECTION INTRAMUSCULAR; INTRAVENOUS; SUBCUTANEOUS ONCE
Status: CANCELLED | OUTPATIENT
Start: 2019-05-28

## 2019-05-28 RX ORDER — EPINEPHRINE 1 MG/ML
0.3 INJECTION, SOLUTION, CONCENTRATE INTRAVENOUS PRN
Status: CANCELLED | OUTPATIENT
Start: 2019-05-28

## 2019-05-28 RX ORDER — METHYLPREDNISOLONE SODIUM SUCCINATE 125 MG/2ML
125 INJECTION, POWDER, LYOPHILIZED, FOR SOLUTION INTRAMUSCULAR; INTRAVENOUS ONCE
Status: CANCELLED | OUTPATIENT
Start: 2019-05-28

## 2019-05-28 RX ORDER — DEXAMETHASONE SODIUM PHOSPHATE 10 MG/ML
10 INJECTION INTRAMUSCULAR; INTRAVENOUS ONCE
Status: COMPLETED | OUTPATIENT
Start: 2019-05-28 | End: 2019-05-28

## 2019-05-28 RX ORDER — SODIUM CHLORIDE 9 MG/ML
250 INJECTION, SOLUTION INTRAVENOUS ONCE
Status: DISCONTINUED | OUTPATIENT
Start: 2019-05-28 | End: 2019-05-29 | Stop reason: HOSPADM

## 2019-05-28 RX ORDER — DEXAMETHASONE SODIUM PHOSPHATE 10 MG/ML
10 INJECTION, SOLUTION INTRAMUSCULAR; INTRAVENOUS ONCE
Status: CANCELLED | OUTPATIENT
Start: 2019-05-28

## 2019-05-28 RX ORDER — SODIUM CHLORIDE 0.9 % (FLUSH) 0.9 %
10 SYRINGE (ML) INJECTION PRN
Status: CANCELLED | OUTPATIENT
Start: 2019-05-28

## 2019-05-28 RX ORDER — SODIUM CHLORIDE 9 MG/ML
20 INJECTION, SOLUTION INTRAVENOUS ONCE
Status: CANCELLED | OUTPATIENT
Start: 2019-05-28

## 2019-05-28 RX ORDER — DIPHENHYDRAMINE HYDROCHLORIDE 50 MG/ML
50 INJECTION INTRAMUSCULAR; INTRAVENOUS ONCE
Status: COMPLETED | OUTPATIENT
Start: 2019-05-28 | End: 2019-05-28

## 2019-05-28 RX ORDER — SODIUM CHLORIDE 0.9 % (FLUSH) 0.9 %
10 SYRINGE (ML) INJECTION PRN
Status: DISCONTINUED | OUTPATIENT
Start: 2019-05-28 | End: 2019-05-29 | Stop reason: HOSPADM

## 2019-05-28 RX ADMIN — Medication 10 ML: at 16:00

## 2019-05-28 RX ADMIN — Medication 10 ML: at 12:07

## 2019-05-28 RX ADMIN — Medication 10 ML: at 11:30

## 2019-05-28 RX ADMIN — Medication 10 ML: at 12:01

## 2019-05-28 RX ADMIN — FAMOTIDINE 20 MG: 10 INJECTION, SOLUTION INTRAVENOUS at 12:16

## 2019-05-28 RX ADMIN — HEPARIN 500 UNITS: 100 SYRINGE at 16:00

## 2019-05-28 RX ADMIN — DEXAMETHASONE SODIUM PHOSPHATE 10 MG: 10 INJECTION INTRAMUSCULAR; INTRAVENOUS at 12:00

## 2019-05-28 RX ADMIN — PACLITAXEL 132 MG: 6 INJECTION, SOLUTION INTRAVENOUS at 14:27

## 2019-05-28 RX ADMIN — TRASTUZUMAB 483 MG: 150 INJECTION, POWDER, LYOPHILIZED, FOR SOLUTION INTRAVENOUS at 12:27

## 2019-05-28 RX ADMIN — Medication 10 ML: at 12:16

## 2019-05-28 RX ADMIN — DIPHENHYDRAMINE HYDROCHLORIDE 50 MG: 50 INJECTION, SOLUTION INTRAMUSCULAR; INTRAVENOUS at 12:07

## 2019-05-28 NOTE — PROGRESS NOTES
Discharge instructions given. Patient verbalizes understanding. Patient discharged home in stable condition.

## 2019-05-28 NOTE — TELEPHONE ENCOUNTER
Met with patient and her  during her follow up with Dr. Lula Jiménez for her recent breast cancer diagnosis. Reviewed with patient symptom management and resources available. Patient was friendly and receptive. Patient's  also requires [facility] paperwork to be completed, this has been completed and faxed to his employer. Patient ready to start her first cycle of Taxol today. Reviewed taxol side effects. Encouraged patient to call with questions or concerns between office visits. Verbalizes understanding. Patient appreciative of visit. NN Will continue to follow.

## 2019-05-28 NOTE — PROGRESS NOTES
therapy. Pathologic stage: pT1a-0.2 cm maximum dimension. Regional lymph nodes: (sn) pN0-no regional lymph node metastasis. Distant metastasis: pMX-cannot be assessed. Ancillary studies: Performed on the patient's previous biopsy specimen  (CYB-; 3/29/2019). Microcalcifications: Present in DCIS. Additional pathologic findings: Fibrocystic change.  Previous  biopsy/excision site. The patient had a port placed, she has right shoulder pain since port insertion, getting better, otherwise doing well. Review of Systems;  CONSTITUTIONAL: No fever, chills. Good appetite and energy level. ENMT: Eyes: No diplopia; Nose: No epistaxis. Mouth: No sore throat. RESPIRATORY: No hemoptysis, shortness of breath, cough. CARDIOVASCULAR: No chest pain, palpitations. GASTROINTESTINAL: No nausea/vomiting, abdominal pain, diarrhea/constipation. GENITOURINARY: No dysuria, urinary frequency, hematuria. NEURO: No syncope, presyncope, headache. MSK: she has chronic pain in the back and right hip.   Remainder:  ROS NEGATIVE    Past Medical History:      Diagnosis Date    Cancer [facility])     malignant lesion in Right breast - for OR 4-18-19     Hyperlipidemia     no medications      Patient Active Problem List   Diagnosis    Infiltrating ductal carcinoma of right breast, stage 1 ([address]        Past Surgical History:      Procedure Laterality Date    BREAST BIOPSY      BREAST BIOPSY Right 4/18/2019    RIGHT PARTIAL MASTECTOMY WITH NEEDLE LOCALIZATION AND SENTINEL NODE performed by Katiana Marques MD at [facility] / REMOVAL / REPLACEMENT VENOUS ACCESS CATHETER N/A 5/17/2019    PORT INSERTION performed by Archie Webster MD at [facility] OR       Family History:  Family History   Problem Relation Age of Onset    Breast Cancer Mother     Cancer Father         esophageal, stomach    Cancer Paternal Grandfather         colon       Medications:  Reviewed and reconciled. Social History:  Social History     Socioeconomic History    Marital status:      Spouse name: Not on file    Number of children: Not on file    Years of education: Not on file    Highest education level: Not on file   Occupational History    Not on file   Social Needs    Financial resource strain: Not on file    Food insecurity:     Worry: Not on file     Inability: Not on file    Transportation needs:     Medical: Not on file     Non-medical: Not on file   Tobacco Use    Smoking status: Never Smoker    Smokeless tobacco: Never Used   Substance and Sexual Activity    Alcohol use: Not Currently    Drug use: Never    Sexual activity: Yes     Partners: Male   Lifestyle    Physical activity:     Days per week: Not on file     Minutes per session: Not on file    Stress: Not on file   Relationships    Social connections:     Talks on phone: Not on file     Gets together: Not on file     Attends Anglican service: Not on file     Active member of club or organization: Not on file     Attends meetings of clubs or organizations: Not on file     Relationship status: Not on file    Intimate partner violence:     Fear of current or ex partner: Not on file     Emotionally abused: Not on file     Physically abused: Not on file     Forced sexual activity: Not on file   Other Topics Concern    Not on file   Social History Narrative    Not on file       Allergies:  No Known Allergies     OB/GYN:  The patient is , she had her menarche at the age of 9-12, she had a hysterectomy, no oophorectomies, 1st live birth was at 20, she used OCPs for about 10 years. Physical Exam:  BP (!) 118/58 (Site: Left Upper Arm, Position: Sitting, Cuff Size: Medium Adult)   Pulse 78   Temp 99.2 °F (37.3 °C)   Resp 14   Ht 5' 3\" (1.6 m)   Wt 130 lb (59 kg)   SpO2 100%   BMI 23.03 kg/m²   GENERAL: Alert, oriented x 3, not in acute distress. HEENT: PERRLA; EOMI. Oropharynx clear. NECK: Supple. No palpable cervical or supraclavicular lymphadenopathy. LUNGS: Good air entry bilaterally. No wheezing, crackles or rhonchi. BREASTS: The left breast exam is negative for any skin changes, no nipple discharge, no palpable masses, no palpable left axillary lymphadenopathy, the right breast exam is negative for an incision from her recent lumpectomy, it is helaing nicely,  no palpable masses, no skin erythema, no palpable right axillary lymphadenopathy. CHEST: right sided port, mild bruising. CARDIOVASCULAR: Regular rate. No murmurs, rubs or gallops. ABDOMEN: Soft. Non-tender, non-distended. Positive bowel sounds. EXTREMITIES: Without clubbing, cyanosis, or edema. NEUROLOGIC: No focal deficits. ECOG PS 0    Impression/Plan:      The patient is a 25-year-old postmenopausal lady with a PMH significant for hyperlipidemia, who had presented with an abnormal screening Mammogram, she was diagnosed with a right breast invasive ductal carcinoma, grade 2, measuring 9 mm, ER positive, 80% CA +5% HER-2/dougie positive, 3+ by IHC. Patient with clinical stage [id number] disease, she underwent on 4/18/2019 a right breast needle localized lumpectomy, with SLN excisional biopsy, tumor size is 2mm, grade 2, with associated DCIS, measuring 7mm, high grade, comedo type with central necrosis, margins are negative, 4 SN were removed, they were all neg for metastatic disease, final pathologic stage pT1a(sn)pN0M0, prognostic stage IA disease. I discussed with the patient and her spouse pathology results, stage and prognosis, adjuvant RT and adjuvant ET with an AI are recommended.  The patient has a small Her2 pos disease (<5mm), I reviewed with her the nccn guidelines and the risks of the  Chemotherapy, she is young and healthy, she is very nervous about the diagnosis of breast cancer, she is very well informed about her disease, she prefers to be treated, JAGDISH SHAFER was originally planned, but given the size of the tumor (<5mm), decided to treat with Taxol (weekly for 12 weeks) with herceptin, the side effects of the treatment including were discussed with the patient, 2D echo has been done LVEF is adequate for treatment with Herceptin. Patient with a personal history of breast cancer, family history is positive for breast and ovarian cancer, testing for HBOC is recommended, MyRisk testing was done, neg for clinically significant mutations. Proceed with chemo today 5/28/2019, Taxol/Herceptin, week #1. She will be due for a repeat 2 D echo the end of August.    RTC in 1 week with labs, treatment. Thank you for allowing us to participate in the care of Mrs. Quintanilla.     Hannah Seaman MD   HEMATOLOGY/MEDICAL ONCOLOGY  [address] [place] [address] MED ONCOLOGY  [address] [place] [id number]  Dept: [phone]

## 2019-05-30 ENCOUNTER — OFFICE VISIT (OUTPATIENT)
Dept: VASCULAR SURGERY | Age: 54
End: 2019-05-30

## 2019-05-30 DIAGNOSIS — Z98.890 POST-OPERATIVE STATE: ICD-10-CM

## 2019-05-30 PROCEDURE — 99024 POSTOP FOLLOW-UP VISIT: CPT | Performed by: SURGERY

## 2019-06-03 LAB
ABSOLUTE BASO #: 0.1 10*3/UL (ref 0–0.1)
ABSOLUTE EOS #: 0.2 10*3/UL (ref 0–0.4)
ABSOLUTE NEUT #: 3.4 10*3/UL (ref 2.3–7.9)
ALBUMIN: 3.6 GM/DL (ref 3.1–4.5)
ALP BLD-CCNC: 50 U/L (ref 45–117)
ALT SERPL-CCNC: 17 U/L (ref 12–78)
AST SERPL-CCNC: 8 IU/L (ref 3–35)
BASOPHILS %: 0.9 % (ref 0–1)
BILIRUB SERPL-MCNC: 0.2 MG/DL (ref 0.2–1)
BUN BLDV-MCNC: 14 MG/DL (ref 7–24)
CALCIUM SERPL-MCNC: 8.5 MG/DL (ref 8.5–10.5)
CHLORIDE BLD-SCNC: 109 MMOL/L (ref 98–107)
CO2: 27 MMOL/L (ref 21–32)
CREAT SERPL-MCNC: 0.62 MG/DL (ref 0.55–1.02)
EOSINOPHILS %: 4.2 % (ref 1–4)
GFR AFRICAN AMERICAN: > 60 ML/MIN
GFR SERPL CREATININE-BSD FRML MDRD: >60 ML/MIN/
GLUCOSE: 78 MG/DL (ref 65–99)
HCT VFR BLD CALC: 37.1 % (ref 37–47)
HEMOGLOBIN: 11.6 G/DL (ref 12–16)
IMMATURE GRANULOCYTES #: 0 10*3/UL (ref 0–0.1)
IMMATURE GRANULOCYTES: 0.3 % (ref 0–1)
LYMPHOCYTE %: 32.5 % (ref 27–41)
LYMPHOCYTES # BLD: 1.9 10*3/UL (ref 1.3–4.4)
MCH RBC QN AUTO: 31.6 PG (ref 27–31)
MCHC RBC AUTO-ENTMCNC: 31.3 G/DL (ref 33–37)
MCV RBC AUTO: 101.1 FL (ref 81–99)
MONOCYTES # BLD: 0.2 10*3/UL (ref 0.1–1)
MONOCYTES %: 3.3 % (ref 3–9)
NEUTROPHILS %: 58.8 % (ref 47–73)
NUCLEATED RED BLOOD CELLS: 0 % (ref 0–0)
PDW BLD-RTO: 13.4 % (ref 0–14.5)
PLATELET # BLD: 255 10*3/UL (ref 130–400)
PMV BLD AUTO: 10.1 FL (ref 9.6–12.3)
POTASSIUM SERPL-SCNC: 4 MMOL/L (ref 3.5–5.1)
RBC # BLD: 3.67 10*6/UL (ref 4.1–5.1)
SODIUM BLD-SCNC: 142 MMOL/L (ref 136–145)
TOTAL PROTEIN: 6.7 GM/DL (ref 6.4–8.2)
WBC # BLD: 5.8 10*3/UL (ref 4.8–10.8)

## 2019-06-04 ENCOUNTER — HOSPITAL ENCOUNTER (OUTPATIENT)
Dept: INFUSION THERAPY | Age: 54
Discharge: HOME | End: 2019-06-04
Payer: COMMERCIAL

## 2019-06-04 ENCOUNTER — OFFICE VISIT (OUTPATIENT)
Dept: ONCOLOGY | Age: 54
End: 2019-06-04
Payer: COMMERCIAL

## 2019-06-04 VITALS
RESPIRATION RATE: 20 BRPM | SYSTOLIC BLOOD PRESSURE: 92 MMHG | DIASTOLIC BLOOD PRESSURE: 54 MMHG | HEART RATE: 71 BPM | TEMPERATURE: 98.9 F

## 2019-06-04 VITALS
BODY MASS INDEX: 22.88 KG/M2 | TEMPERATURE: 98.5 F | WEIGHT: 129.1 LBS | DIASTOLIC BLOOD PRESSURE: 58 MMHG | HEIGHT: 63 IN | HEART RATE: 81 BPM | SYSTOLIC BLOOD PRESSURE: 102 MMHG

## 2019-06-04 DIAGNOSIS — C50.911 INFILTRATING DUCTAL CARCINOMA OF RIGHT BREAST, STAGE 1 (HCC): Primary | ICD-10-CM

## 2019-06-04 DIAGNOSIS — D75.89 MACROCYTOSIS: Primary | ICD-10-CM

## 2019-06-04 DIAGNOSIS — C50.911 INFILTRATING DUCTAL CARCINOMA OF RIGHT BREAST, STAGE 1 (HCC): ICD-10-CM

## 2019-06-04 PROCEDURE — 2500000003 HC RX 250 WO HCPCS: Performed by: INTERNAL MEDICINE

## 2019-06-04 PROCEDURE — 96413 CHEMO IV INFUSION 1 HR: CPT

## 2019-06-04 PROCEDURE — 2580000003 HC RX 258: Performed by: INTERNAL MEDICINE

## 2019-06-04 PROCEDURE — 2580000003 HC RX 258

## 2019-06-04 PROCEDURE — 6360000002 HC RX W HCPCS: Performed by: INTERNAL MEDICINE

## 2019-06-04 PROCEDURE — 96375 TX/PRO/DX INJ NEW DRUG ADDON: CPT

## 2019-06-04 PROCEDURE — 99214 OFFICE O/P EST MOD 30 MIN: CPT | Performed by: INTERNAL MEDICINE

## 2019-06-04 RX ORDER — DEXAMETHASONE SODIUM PHOSPHATE 10 MG/ML
10 INJECTION INTRAMUSCULAR; INTRAVENOUS ONCE
Status: COMPLETED | OUTPATIENT
Start: 2019-06-04 | End: 2019-06-04

## 2019-06-04 RX ORDER — DIPHENHYDRAMINE HYDROCHLORIDE 50 MG/ML
50 INJECTION INTRAMUSCULAR; INTRAVENOUS ONCE
Status: CANCELLED | OUTPATIENT
Start: 2019-06-04

## 2019-06-04 RX ORDER — SODIUM CHLORIDE 9 MG/ML
250 INJECTION, SOLUTION INTRAVENOUS ONCE
Status: COMPLETED | OUTPATIENT
Start: 2019-06-04 | End: 2019-06-04

## 2019-06-04 RX ORDER — MEPERIDINE HYDROCHLORIDE 25 MG/ML
12.5 INJECTION INTRAMUSCULAR; INTRAVENOUS; SUBCUTANEOUS ONCE
Status: CANCELLED | OUTPATIENT
Start: 2019-06-04

## 2019-06-04 RX ORDER — DEXAMETHASONE SODIUM PHOSPHATE 10 MG/ML
10 INJECTION, SOLUTION INTRAMUSCULAR; INTRAVENOUS ONCE
Status: CANCELLED | OUTPATIENT
Start: 2019-06-04

## 2019-06-04 RX ORDER — HEPARIN SODIUM (PORCINE) LOCK FLUSH IV SOLN 100 UNIT/ML 100 UNIT/ML
500 SOLUTION INTRAVENOUS PRN
Status: CANCELLED | OUTPATIENT
Start: 2019-06-04

## 2019-06-04 RX ORDER — EPINEPHRINE 1 MG/ML
0.3 INJECTION, SOLUTION, CONCENTRATE INTRAVENOUS PRN
Status: CANCELLED | OUTPATIENT
Start: 2019-06-04

## 2019-06-04 RX ORDER — DIPHENHYDRAMINE HYDROCHLORIDE 50 MG/ML
50 INJECTION INTRAMUSCULAR; INTRAVENOUS ONCE
Status: COMPLETED | OUTPATIENT
Start: 2019-06-04 | End: 2019-06-04

## 2019-06-04 RX ORDER — SODIUM CHLORIDE 0.9 % (FLUSH) 0.9 %
10 SYRINGE (ML) INJECTION PRN
Status: CANCELLED | OUTPATIENT
Start: 2019-06-04

## 2019-06-04 RX ORDER — SODIUM CHLORIDE 9 MG/ML
INJECTION, SOLUTION INTRAVENOUS
Status: COMPLETED
Start: 2019-06-04 | End: 2019-06-04

## 2019-06-04 RX ORDER — METHYLPREDNISOLONE SODIUM SUCCINATE 125 MG/2ML
125 INJECTION, POWDER, LYOPHILIZED, FOR SOLUTION INTRAMUSCULAR; INTRAVENOUS ONCE
Status: CANCELLED | OUTPATIENT
Start: 2019-06-04

## 2019-06-04 RX ORDER — HEPARIN SODIUM (PORCINE) LOCK FLUSH IV SOLN 100 UNIT/ML 100 UNIT/ML
500 SOLUTION INTRAVENOUS PRN
Status: DISCONTINUED | OUTPATIENT
Start: 2019-06-04 | End: 2019-06-05 | Stop reason: HOSPADM

## 2019-06-04 RX ORDER — SODIUM CHLORIDE 9 MG/ML
20 INJECTION, SOLUTION INTRAVENOUS ONCE
Status: CANCELLED | OUTPATIENT
Start: 2019-06-04

## 2019-06-04 RX ORDER — SODIUM CHLORIDE 9 MG/ML
INJECTION, SOLUTION INTRAVENOUS CONTINUOUS
Status: CANCELLED | OUTPATIENT
Start: 2019-06-04

## 2019-06-04 RX ORDER — SODIUM CHLORIDE 0.9 % (FLUSH) 0.9 %
5 SYRINGE (ML) INJECTION PRN
Status: CANCELLED | OUTPATIENT
Start: 2019-06-04

## 2019-06-04 RX ORDER — 0.9 % SODIUM CHLORIDE 0.9 %
10 VIAL (ML) INJECTION ONCE
Status: CANCELLED | OUTPATIENT
Start: 2019-06-04

## 2019-06-04 RX ORDER — SODIUM CHLORIDE 0.9 % (FLUSH) 0.9 %
10 SYRINGE (ML) INJECTION PRN
Status: DISCONTINUED | OUTPATIENT
Start: 2019-06-04 | End: 2019-06-05 | Stop reason: HOSPADM

## 2019-06-04 RX ADMIN — FAMOTIDINE 20 MG: 10 INJECTION, SOLUTION INTRAVENOUS at 11:36

## 2019-06-04 RX ADMIN — PACLITAXEL 132 MG: 6 INJECTION, SOLUTION INTRAVENOUS at 12:01

## 2019-06-04 RX ADMIN — Medication 10 ML: at 11:02

## 2019-06-04 RX ADMIN — DIPHENHYDRAMINE HYDROCHLORIDE 50 MG: 50 INJECTION, SOLUTION INTRAMUSCULAR; INTRAVENOUS at 11:31

## 2019-06-04 RX ADMIN — Medication 10 ML: at 13:24

## 2019-06-04 RX ADMIN — Medication 10 ML: at 11:25

## 2019-06-04 RX ADMIN — SODIUM CHLORIDE 250 ML: 9 INJECTION, SOLUTION INTRAVENOUS at 11:03

## 2019-06-04 RX ADMIN — DEXAMETHASONE SODIUM PHOSPHATE 10 MG: 10 INJECTION INTRAMUSCULAR; INTRAVENOUS at 11:25

## 2019-06-04 RX ADMIN — Medication 10 ML: at 11:31

## 2019-06-04 RX ADMIN — Medication 10 ML: at 11:36

## 2019-06-04 RX ADMIN — HEPARIN 500 UNITS: 100 SYRINGE at 13:24

## 2019-06-04 NOTE — PROGRESS NOTES
[address] MED ONCOLOGY  [address] [facility] [address]  Dept: [phone]  Attending Progress Note      Reason for Visit:   Right Breast Cancer. Referring Physician:  Diana Catalan MD    PCP:  Sameul Klinefelter, MD    History of Present Illness: The patient is a 58-year-old lady with a PMH significant for hyperlipidemia, who had presented with an abnormal screening Mammogram, was done on 3/12/2019, revealing calcifications in the right UOQ, she had a right breast US done on 3/13/50244, no masses were seen, she underwent on 3/29/2019 a stereotactic biopsy of the right breast calcifications. Due to biopsy needle malfunction and lack of tissue obtained,  a clip was not placed at the time of biopsy, path:    Breast, right, site not further specified, core needle biopsy:  Infiltrating ductal carcinoma, moderately differentiated. Ductal carcinoma in situ, comedo type, high nuclear grade. Comment:     Intradepartmental consultation is obtained.  Infiltrating  ductal carcinoma corresponds to architectural grade 3, nuclear grade 2,  mitotic grade 1; overall grade 6/9-moderately differentiated; grade 2. Breast Cancer Marker Studies:  Estrogen Receptors (ER): Positive (80% nuclei staining). Staining intensity = strong. Progesterone Receptors ([place]): Positive (5% nuclei staining). Staining intensity = weak. Her-2/dougie (c-erb B-2) protein expression:  Positive (3+). She had bilateral Breast MRI done on 4/4/2019 revealing: There is scattered fibroglandular tissue with minimal background  parenchymal enhancement. An irregular, enhancing mass is noted in the  right breast 10:00 which measures 9 x 6 x 7 mm (image 43 of the axial  T1 postcontrast series). No suspicious mass or non-mass enhancement  seen on the left. There is no lymphadenopathy. Bilateral skin and  nipple areolar complexes are normal. Visualized portions of the chest  and abdomen are unremarkable. Impression  1. therapy. Pathologic stage: pT1a-0.2 cm maximum dimension. Regional lymph nodes: (sn) pN0-no regional lymph node metastasis. Distant metastasis: pMX-cannot be assessed. Ancillary studies: Performed on the patient's previous biopsy specimen  (RBZ-; 3/29/2019). Microcalcifications: Present in DCIS. Additional pathologic findings: Fibrocystic change.  Previous  biopsy/excision site. The patient has a small tumor, HER-2/dougie and hormone receptor positive, given her young age and excellent health, she was not comfortable with adjuvant treatment with only hormonal therapy. The patient was started on adjuvant chemotherapy with Taxol and Herceptin on 5/28/2019, she had some muscle and joints pain, otherwise is doing well. Review of Systems;  CONSTITUTIONAL: No fever, chills. Good appetite and energy level. ENMT: Eyes: No diplopia; Nose: No epistaxis. Mouth: No sore throat. RESPIRATORY: No hemoptysis, shortness of breath, cough. CARDIOVASCULAR: No chest pain, palpitations. GASTROINTESTINAL: No nausea/vomiting, abdominal pain, diarrhea/constipation. GENITOURINARY: No dysuria, urinary frequency, hematuria. NEURO: No syncope, presyncope, headache. MSK: she has chronic pain in the back and right hip.   Remainder:  ROS NEGATIVE    Past Medical History:      Diagnosis Date    Cancer [facility])     malignant lesion in Right breast - for OR 4-18-19     Hyperlipidemia     no medications     Post-operative state 5/30/2019     Patient Active Problem List   Diagnosis    Infiltrating ductal carcinoma of right breast, stage 1 (HCC)    Post-operative state        Past Surgical History:      Procedure Laterality Date    BREAST BIOPSY      BREAST BIOPSY Right 4/18/2019    RIGHT PARTIAL MASTECTOMY WITH NEEDLE LOCALIZATION AND SENTINEL NODE performed by Rodrigo Oppenheim, MD at [facility] 13 / REMOVAL / REPLACEMENT VENOUS ACCESS CATHETER N/A 5/17/2019 PORT INSERTION performed by Ramirez Johnson MD at [facility] OR       Family History:  Family History   Problem Relation Age of Onset    Breast Cancer Mother     Cancer Father         esophageal, stomach    Cancer Paternal Grandfather         colon       Medications:  Reviewed and reconciled. Social History:  Social History     Socioeconomic History    Marital status:      Spouse name: Not on file    Number of children: Not on file    Years of education: Not on file    Highest education level: Not on file   Occupational History    Not on file   Social Needs    Financial resource strain: Not on file    Food insecurity:     Worry: Not on file     Inability: Not on file    Transportation needs:     Medical: Not on file     Non-medical: Not on file   Tobacco Use    Smoking status: Never Smoker    Smokeless tobacco: Never Used   Substance and Sexual Activity    Alcohol use: Not Currently    Drug use: Never    Sexual activity: Yes     Partners: Male   Lifestyle    Physical activity:     Days per week: Not on file     Minutes per session: Not on file    Stress: Not on file   Relationships    Social connections:     Talks on phone: Not on file     Gets together: Not on file     Attends Yazidism service: Not on file     Active member of club or organization: Not on file     Attends meetings of clubs or organizations: Not on file     Relationship status: Not on file    Intimate partner violence:     Fear of current or ex partner: Not on file     Emotionally abused: Not on file     Physically abused: Not on file     Forced sexual activity: Not on file   Other Topics Concern    Not on file   Social History Narrative    Not on file       Allergies:  No Known Allergies     OB/GYN:  The patient is , she had her menarche at the age of 9-12, she had a hysterectomy, no oophorectomies, 1st live birth was at 20, she used OCPs for about 10 years.     Physical Exam:  BP (!) 102/58 (Site: Left Upper guidelines and the risks of the  Chemotherapy, she is young and healthy, she is very nervous about the diagnosis of breast cancer, she is very well informed about her disease, she prefers to be treated, JAGDISH SHAFER was originally planned, but given the size of the tumor (<5mm), decided to treat with Taxol (weekly for 12 weeks) with herceptin, the side effects of the treatment including were discussed with the patient, 2D echo has been done LVEF is adequate for treatment with Herceptin. Patient with a personal history of breast cancer, family history is positive for breast and ovarian cancer, testing for HBOC is recommended, MyRisk testing was done, neg for clinically significant mutations. She was started adjuvant chemotherapy with Taxol and Herceptin on 5/28/2019. Labs reviewed. Proceed with chemo today 6/4/2019, single agent Taxol, week #2. Mild macrocytosis, ordered by B-12 and folate. She will be due for a repeat 2 D echo the end of August.    RTC in 1 week with labs, treatment. Thank you for allowing us to participate in the care of Mrs. Quintanilla.     Stacy Holm MD   HEMATOLOGY/MEDICAL ONCOLOGY  [address] [place] [address] MED ONCOLOGY  [facility] [address]  Dept: [phone]

## 2019-06-10 LAB
ABSOLUTE BASO #: 0 10*3/UL (ref 0–0.1)
ABSOLUTE EOS #: 0.1 10*3/UL (ref 0–0.4)
ABSOLUTE NEUT #: 3 10*3/UL (ref 2.3–7.9)
ALBUMIN: 3.9 GM/DL (ref 3.1–4.5)
ALP BLD-CCNC: 52 U/L (ref 45–117)
ALT SERPL-CCNC: 18 U/L (ref 12–78)
AST SERPL-CCNC: 13 IU/L (ref 3–35)
BASOPHILS %: 0.6 % (ref 0–1)
BILIRUB SERPL-MCNC: 0.6 MG/DL (ref 0.2–1)
BUN BLDV-MCNC: 15 MG/DL (ref 7–24)
CALCIUM SERPL-MCNC: 8.8 MG/DL (ref 8.5–10.5)
CHLORIDE BLD-SCNC: 109 MMOL/L (ref 98–107)
CO2: 28 MMOL/L (ref 21–32)
CREAT SERPL-MCNC: 0.74 MG/DL (ref 0.55–1.02)
EOSINOPHILS %: 1.4 % (ref 1–4)
FOLIC ACID, SERUM: 11.22 NG/ML
GFR AFRICAN AMERICAN: > 60 ML/MIN
GFR SERPL CREATININE-BSD FRML MDRD: >60 ML/MIN/
GLUCOSE: 85 MG/DL (ref 65–99)
HCT VFR BLD CALC: 39.6 % (ref 37–47)
HEMOGLOBIN: 12.4 G/DL (ref 12–16)
IMMATURE GRANULOCYTES #: 0 10*3/UL (ref 0–0.1)
IMMATURE GRANULOCYTES: 0.2 % (ref 0–1)
LYMPHOCYTE %: 35.1 % (ref 27–41)
LYMPHOCYTES # BLD: 1.8 10*3/UL (ref 1.3–4.4)
MCH RBC QN AUTO: 31.6 PG (ref 27–31)
MCHC RBC AUTO-ENTMCNC: 31.3 G/DL (ref 33–37)
MCV RBC AUTO: 101 FL (ref 81–99)
MONOCYTES # BLD: 0.2 10*3/UL (ref 0.1–1)
MONOCYTES %: 3.7 % (ref 3–9)
NEUTROPHILS %: 59 % (ref 47–73)
NUCLEATED RED BLOOD CELLS: 0 % (ref 0–0)
PDW BLD-RTO: 13.5 % (ref 0–14.5)
PLATELET # BLD: 318 10*3/UL (ref 130–400)
PMV BLD AUTO: 10.4 FL (ref 9.6–12.3)
POTASSIUM SERPL-SCNC: 4.3 MMOL/L (ref 3.5–5.1)
RBC # BLD: 3.92 10*6/UL (ref 4.1–5.1)
SODIUM BLD-SCNC: 144 MMOL/L (ref 136–145)
TOTAL PROTEIN: 7.2 GM/DL (ref 6.4–8.2)
VITAMIN B-12: 274 PG/ML (ref 247–911)
WBC # BLD: 5.2 10*3/UL (ref 4.8–10.8)

## 2019-06-11 ENCOUNTER — TELEPHONE (OUTPATIENT)
Dept: CASE MANAGEMENT | Age: 54
End: 2019-06-11

## 2019-06-11 ENCOUNTER — HOSPITAL ENCOUNTER (OUTPATIENT)
Dept: INFUSION THERAPY | Age: 54
Discharge: HOME | End: 2019-06-11
Payer: COMMERCIAL

## 2019-06-11 ENCOUNTER — OFFICE VISIT (OUTPATIENT)
Dept: ONCOLOGY | Age: 54
End: 2019-06-11
Payer: COMMERCIAL

## 2019-06-11 VITALS — SYSTOLIC BLOOD PRESSURE: 100 MMHG | DIASTOLIC BLOOD PRESSURE: 55 MMHG | RESPIRATION RATE: 20 BRPM | HEART RATE: 57 BPM

## 2019-06-11 VITALS
BODY MASS INDEX: 22.61 KG/M2 | HEART RATE: 95 BPM | WEIGHT: 127.6 LBS | TEMPERATURE: 98.2 F | SYSTOLIC BLOOD PRESSURE: 110 MMHG | DIASTOLIC BLOOD PRESSURE: 59 MMHG | HEIGHT: 63 IN

## 2019-06-11 DIAGNOSIS — C50.911 INFILTRATING DUCTAL CARCINOMA OF RIGHT BREAST, STAGE 1 (HCC): ICD-10-CM

## 2019-06-11 DIAGNOSIS — E53.8 VITAMIN B12 DEFICIENCY: Primary | ICD-10-CM

## 2019-06-11 DIAGNOSIS — C50.911 INFILTRATING DUCTAL CARCINOMA OF RIGHT BREAST, STAGE 1 (HCC): Primary | ICD-10-CM

## 2019-06-11 PROCEDURE — 96375 TX/PRO/DX INJ NEW DRUG ADDON: CPT

## 2019-06-11 PROCEDURE — 2500000003 HC RX 250 WO HCPCS: Performed by: INTERNAL MEDICINE

## 2019-06-11 PROCEDURE — 99214 OFFICE O/P EST MOD 30 MIN: CPT | Performed by: INTERNAL MEDICINE

## 2019-06-11 PROCEDURE — 2580000003 HC RX 258: Performed by: INTERNAL MEDICINE

## 2019-06-11 PROCEDURE — 96413 CHEMO IV INFUSION 1 HR: CPT

## 2019-06-11 PROCEDURE — 6360000002 HC RX W HCPCS: Performed by: INTERNAL MEDICINE

## 2019-06-11 RX ORDER — DEXAMETHASONE SODIUM PHOSPHATE 10 MG/ML
10 INJECTION INTRAMUSCULAR; INTRAVENOUS ONCE
Status: COMPLETED | OUTPATIENT
Start: 2019-06-11 | End: 2019-06-11

## 2019-06-11 RX ORDER — HEPARIN SODIUM (PORCINE) LOCK FLUSH IV SOLN 100 UNIT/ML 100 UNIT/ML
500 SOLUTION INTRAVENOUS PRN
Status: CANCELLED | OUTPATIENT
Start: 2019-06-11

## 2019-06-11 RX ORDER — MEPERIDINE HYDROCHLORIDE 25 MG/ML
12.5 INJECTION INTRAMUSCULAR; INTRAVENOUS; SUBCUTANEOUS ONCE
Status: CANCELLED | OUTPATIENT
Start: 2019-06-11

## 2019-06-11 RX ORDER — SODIUM CHLORIDE 0.9 % (FLUSH) 0.9 %
5 SYRINGE (ML) INJECTION PRN
Status: CANCELLED | OUTPATIENT
Start: 2019-06-11

## 2019-06-11 RX ORDER — EPINEPHRINE 1 MG/ML
0.3 INJECTION, SOLUTION, CONCENTRATE INTRAVENOUS PRN
Status: CANCELLED | OUTPATIENT
Start: 2019-06-11

## 2019-06-11 RX ORDER — SODIUM CHLORIDE 0.9 % (FLUSH) 0.9 %
10 SYRINGE (ML) INJECTION PRN
Status: DISCONTINUED | OUTPATIENT
Start: 2019-06-11 | End: 2019-06-12 | Stop reason: HOSPADM

## 2019-06-11 RX ORDER — DIPHENHYDRAMINE HYDROCHLORIDE 50 MG/ML
50 INJECTION INTRAMUSCULAR; INTRAVENOUS ONCE
Status: CANCELLED | OUTPATIENT
Start: 2019-06-11

## 2019-06-11 RX ORDER — SODIUM CHLORIDE 9 MG/ML
INJECTION, SOLUTION INTRAVENOUS CONTINUOUS
Status: CANCELLED | OUTPATIENT
Start: 2019-06-11

## 2019-06-11 RX ORDER — DEXAMETHASONE SODIUM PHOSPHATE 10 MG/ML
10 INJECTION, SOLUTION INTRAMUSCULAR; INTRAVENOUS ONCE
Status: CANCELLED | OUTPATIENT
Start: 2019-06-11

## 2019-06-11 RX ORDER — SODIUM CHLORIDE 9 MG/ML
20 INJECTION, SOLUTION INTRAVENOUS ONCE
Status: CANCELLED | OUTPATIENT
Start: 2019-06-11

## 2019-06-11 RX ORDER — HEPARIN SODIUM (PORCINE) LOCK FLUSH IV SOLN 100 UNIT/ML 100 UNIT/ML
500 SOLUTION INTRAVENOUS PRN
Status: DISCONTINUED | OUTPATIENT
Start: 2019-06-11 | End: 2019-06-12 | Stop reason: HOSPADM

## 2019-06-11 RX ORDER — METHYLPREDNISOLONE SODIUM SUCCINATE 125 MG/2ML
125 INJECTION, POWDER, LYOPHILIZED, FOR SOLUTION INTRAMUSCULAR; INTRAVENOUS ONCE
Status: CANCELLED | OUTPATIENT
Start: 2019-06-11

## 2019-06-11 RX ORDER — SODIUM CHLORIDE 0.9 % (FLUSH) 0.9 %
10 SYRINGE (ML) INJECTION PRN
Status: CANCELLED | OUTPATIENT
Start: 2019-06-11

## 2019-06-11 RX ORDER — DIPHENHYDRAMINE HYDROCHLORIDE 50 MG/ML
50 INJECTION INTRAMUSCULAR; INTRAVENOUS ONCE
Status: COMPLETED | OUTPATIENT
Start: 2019-06-11 | End: 2019-06-11

## 2019-06-11 RX ORDER — SODIUM CHLORIDE 9 MG/ML
250 INJECTION, SOLUTION INTRAVENOUS ONCE
Status: COMPLETED | OUTPATIENT
Start: 2019-06-11 | End: 2019-06-11

## 2019-06-11 RX ORDER — 0.9 % SODIUM CHLORIDE 0.9 %
10 VIAL (ML) INJECTION ONCE
Status: CANCELLED | OUTPATIENT
Start: 2019-06-11

## 2019-06-11 RX ORDER — 0.9 % SODIUM CHLORIDE 0.9 %
10 VIAL (ML) INJECTION ONCE
Status: COMPLETED | OUTPATIENT
Start: 2019-06-11 | End: 2019-06-11

## 2019-06-11 RX ORDER — SODIUM CHLORIDE 0.9 % (FLUSH) 0.9 %
20 SYRINGE (ML) INJECTION PRN
Status: CANCELLED | OUTPATIENT
Start: 2019-06-11

## 2019-06-11 RX ADMIN — SODIUM CHLORIDE 250 ML: 9 INJECTION, SOLUTION INTRAVENOUS at 11:20

## 2019-06-11 RX ADMIN — PACLITAXEL 132 MG: 6 INJECTION, SOLUTION INTRAVENOUS at 11:59

## 2019-06-11 RX ADMIN — Medication 10 ML: at 11:28

## 2019-06-11 RX ADMIN — Medication 10 ML: at 11:20

## 2019-06-11 RX ADMIN — FAMOTIDINE 20 MG: 10 INJECTION, SOLUTION INTRAVENOUS at 11:37

## 2019-06-11 RX ADMIN — DEXAMETHASONE SODIUM PHOSPHATE 10 MG: 10 INJECTION INTRAMUSCULAR; INTRAVENOUS at 11:20

## 2019-06-11 RX ADMIN — Medication 10 ML: at 13:12

## 2019-06-11 RX ADMIN — DIPHENHYDRAMINE HYDROCHLORIDE 50 MG: 50 INJECTION, SOLUTION INTRAMUSCULAR; INTRAVENOUS at 11:28

## 2019-06-11 RX ADMIN — SODIUM CHLORIDE, PRESERVATIVE FREE 500 UNITS: 5 INJECTION INTRAVENOUS at 13:12

## 2019-06-11 NOTE — PROGRESS NOTES
Irregular, enhancing mass in the right breast 10:00 is consistent  with biopsy-proven neoplasm. No additional foci of disease identified  in the right breast.  2. There is no lymphadenopathy. 3. No evidence of neoplasm on the left. RECOMMENDATION:  Continued surgical and oncologic consultation is advised. BIRADS 6: Known neoplasm    The patient underwent on 4/18/2019 a right breast needle localized lumpectomy, with SLN excisional biopsy, path:  CANCER CASE SUMMARY  Procedure: Excision (less than total mastectomy; needle localization  partial mastectomy [lumpectomy]). Specimen laterality: Right. Tumor site: Not specified. Tumor size: 0.2 cm (invasive component). Histologic type: Invasive ductal carcinoma. Histologic grade: Glandular/tubular differentiation-score = 3; nuclear  pleomorphism-score = 2; mitotic rate-score = 1.  Overall grade 2;  6/9-moderately differentiated. Tumor focality: Single focus of invasive carcinoma. Ductal carcinoma in situ: Present. Size/extent of DCIS: 0.7 cm.  DCIS completely surrounds a small, residual  focus of infiltrating ductal carcinoma in the current specimen. Architectural pattern: Comedo type. Nuclear grade: Grade III (high nuclear grade). Necrosis: Present, Central (expansive \"comedo\" necrosis). Microcalcifications: Present. Margins: Invasive carcinoma margins-uninvolved by invasive carcinoma. Distance from closest margin-0.9 cm (inferior inked margin). DCIS margins-uninvolved by DCIS.  Distance from closest margin-0.3 cm  (anterior inked margin of resection-specimen A). Note: Specimen C  represents additional anterior/superior breast tissue [3.0 x 2.5 x 1.0  cm] which represents larger final margin of resection for DCIS with  regard to anterior margin of resection). Regional lymph nodes: Uninvolved by tumor cells.  Number of lymph nodes  examined-4 (designated sentinel lymph nodes).  Number of lymph nodes  involved-0.   Treatment effect: No known presurgical therapy. Pathologic stage: pT1a-0.2 cm maximum dimension. Regional lymph nodes: (sn) pN0-no regional lymph node metastasis. Distant metastasis: pMX-cannot be assessed. Ancillary studies: Performed on the patient's previous biopsy specimen  (X-; 3/29/2019). Microcalcifications: Present in DCIS. Additional pathologic findings: Fibrocystic change.  Previous  biopsy/excision site. The patient has a small tumor, HER-2/dougie and hormone receptor positive, given her young age and excellent health, she was not comfortable with adjuvant treatment with only hormonal therapy. The patient was started on adjuvant chemotherapy with Taxol and Herceptin on 5/28/2019, she is doing well overall. Review of Systems;  CONSTITUTIONAL: No fever, chills. Good appetite and energy level. ENMT: Eyes: No diplopia; Nose: No epistaxis. Mouth: No sore throat. RESPIRATORY: No hemoptysis, shortness of breath, cough. CARDIOVASCULAR: No chest pain, palpitations. GASTROINTESTINAL: No nausea/vomiting, abdominal pain, diarrhea/constipation. GENITOURINARY: No dysuria, urinary frequency, hematuria. NEURO: No syncope, presyncope, headache. MSK: she has chronic pain in the back and right hip.   Remainder:  ROS NEGATIVE    Past Medical History:      Diagnosis Date    Cancer [facility])     malignant lesion in Right breast - for OR 4-18-19     Hyperlipidemia     no medications     Post-operative state 5/30/2019     Patient Active Problem List   Diagnosis    Infiltrating ductal carcinoma of right breast, stage 1 (HCC)    Post-operative state        Past Surgical History:      Procedure Laterality Date    BREAST BIOPSY      BREAST BIOPSY Right 4/18/2019    RIGHT PARTIAL MASTECTOMY WITH NEEDLE LOCALIZATION AND SENTINEL NODE performed by Yolanda Jones MD at [facility] 13 / REMOVAL / REPLACEMENT VENOUS ACCESS CATHETER N/A 5/17/2019    PORT INSERTION performed by Carina Dhillon Mona Bueno MD at [facility] OR       Family History:  Family History   Problem Relation Age of Onset    Breast Cancer Mother     Cancer Father         esophageal, stomach    Cancer Paternal Grandfather         colon       Medications:  Reviewed and reconciled. Social History:  Social History     Socioeconomic History    Marital status:      Spouse name: Not on file    Number of children: Not on file    Years of education: Not on file    Highest education level: Not on file   Occupational History    Not on file   Social Needs    Financial resource strain: Not on file    Food insecurity:     Worry: Not on file     Inability: Not on file    Transportation needs:     Medical: Not on file     Non-medical: Not on file   Tobacco Use    Smoking status: Never Smoker    Smokeless tobacco: Never Used   Substance and Sexual Activity    Alcohol use: Not Currently    Drug use: Never    Sexual activity: Yes     Partners: Male   Lifestyle    Physical activity:     Days per week: Not on file     Minutes per session: Not on file    Stress: Not on file   Relationships    Social connections:     Talks on phone: Not on file     Gets together: Not on file     Attends Rastafarian service: Not on file     Active member of club or organization: Not on file     Attends meetings of clubs or organizations: Not on file     Relationship status: Not on file    Intimate partner violence:     Fear of current or ex partner: Not on file     Emotionally abused: Not on file     Physically abused: Not on file     Forced sexual activity: Not on file   Other Topics Concern    Not on file   Social History Narrative    Not on file       Allergies:  No Known Allergies     OB/GYN:  The patient is , she had her menarche at the age of 9-12, she had a hysterectomy, no oophorectomies, 1st live birth was at 20, she used OCPs for about 10 years.     Physical Exam:  BP (!) 110/59 (Site: Left Upper Arm, Position: Sitting, Cuff Size:

## 2019-06-17 LAB
ABSOLUTE BASO #: 0 10*3/UL (ref 0–0.1)
ABSOLUTE EOS #: 0 10*3/UL (ref 0–0.4)
ABSOLUTE NEUT #: 2.1 10*3/UL (ref 2.3–7.9)
ALBUMIN: 3.7 GM/DL (ref 3.1–4.5)
ALP BLD-CCNC: 51 U/L (ref 45–117)
ALT SERPL-CCNC: 22 U/L (ref 12–78)
AST SERPL-CCNC: 12 IU/L (ref 3–35)
BASOPHILS %: 1 % (ref 0–1)
BILIRUB SERPL-MCNC: 0.4 MG/DL (ref 0.2–1)
BUN BLDV-MCNC: 13 MG/DL (ref 7–24)
CALCIUM SERPL-MCNC: 8.9 MG/DL (ref 8.5–10.5)
CHLORIDE BLD-SCNC: 108 MMOL/L (ref 98–107)
CO2: 28 MMOL/L (ref 21–32)
CREAT SERPL-MCNC: 0.68 MG/DL (ref 0.55–1.02)
EOSINOPHILS %: 1 % (ref 1–4)
GFR AFRICAN AMERICAN: > 60 ML/MIN
GFR SERPL CREATININE-BSD FRML MDRD: >60 ML/MIN/
GLUCOSE: 100 MG/DL (ref 65–99)
HCT VFR BLD CALC: 36.1 % (ref 37–47)
HEMOGLOBIN: 11.2 G/DL (ref 12–16)
IMMATURE GRANULOCYTES #: 0 10*3/UL (ref 0–0.1)
IMMATURE GRANULOCYTES: 0.5 % (ref 0–1)
LYMPHOCYTE %: 37.6 % (ref 27–41)
LYMPHOCYTES # BLD: 1.5 10*3/UL (ref 1.3–4.4)
MCH RBC QN AUTO: 31.2 PG (ref 27–31)
MCHC RBC AUTO-ENTMCNC: 31 G/DL (ref 33–37)
MCV RBC AUTO: 100.6 FL (ref 81–99)
MONOCYTES # BLD: 0.2 10*3/UL (ref 0.1–1)
MONOCYTES %: 6.2 % (ref 3–9)
NEUTROPHILS %: 53.7 % (ref 47–73)
NUCLEATED RED BLOOD CELLS: 0 % (ref 0–0)
PDW BLD-RTO: 14 % (ref 0–14.5)
PLATELET # BLD: 292 10*3/UL (ref 130–400)
PMV BLD AUTO: 9.9 FL (ref 9.6–12.3)
POTASSIUM SERPL-SCNC: 4 MMOL/L (ref 3.5–5.1)
RBC # BLD: 3.59 10*6/UL (ref 4.1–5.1)
SODIUM BLD-SCNC: 143 MMOL/L (ref 136–145)
TOTAL PROTEIN: 7.2 GM/DL (ref 6.4–8.2)
WBC # BLD: 3.9 10*3/UL (ref 4.8–10.8)

## 2019-06-18 ENCOUNTER — OFFICE VISIT (OUTPATIENT)
Dept: ONCOLOGY | Age: 54
End: 2019-06-18
Payer: COMMERCIAL

## 2019-06-18 ENCOUNTER — HOSPITAL ENCOUNTER (OUTPATIENT)
Dept: INFUSION THERAPY | Age: 54
Discharge: HOME | End: 2019-06-18
Payer: COMMERCIAL

## 2019-06-18 VITALS
BODY MASS INDEX: 22.61 KG/M2 | HEART RATE: 72 BPM | TEMPERATURE: 98.5 F | DIASTOLIC BLOOD PRESSURE: 70 MMHG | SYSTOLIC BLOOD PRESSURE: 107 MMHG | HEIGHT: 63 IN | WEIGHT: 127.6 LBS

## 2019-06-18 VITALS — RESPIRATION RATE: 18 BRPM | DIASTOLIC BLOOD PRESSURE: 53 MMHG | SYSTOLIC BLOOD PRESSURE: 99 MMHG | HEART RATE: 68 BPM

## 2019-06-18 DIAGNOSIS — C50.911 INFILTRATING DUCTAL CARCINOMA OF RIGHT BREAST, STAGE 1 (HCC): Primary | ICD-10-CM

## 2019-06-18 PROCEDURE — 96413 CHEMO IV INFUSION 1 HR: CPT

## 2019-06-18 PROCEDURE — 99214 OFFICE O/P EST MOD 30 MIN: CPT | Performed by: INTERNAL MEDICINE

## 2019-06-18 PROCEDURE — 6360000002 HC RX W HCPCS: Performed by: INTERNAL MEDICINE

## 2019-06-18 PROCEDURE — 96375 TX/PRO/DX INJ NEW DRUG ADDON: CPT

## 2019-06-18 PROCEDURE — 2500000003 HC RX 250 WO HCPCS: Performed by: INTERNAL MEDICINE

## 2019-06-18 PROCEDURE — 2580000003 HC RX 258: Performed by: INTERNAL MEDICINE

## 2019-06-18 PROCEDURE — 96366 THER/PROPH/DIAG IV INF ADDON: CPT

## 2019-06-18 PROCEDURE — 96417 CHEMO IV INFUS EACH ADDL SEQ: CPT

## 2019-06-18 RX ORDER — MEPERIDINE HYDROCHLORIDE 25 MG/ML
12.5 INJECTION INTRAMUSCULAR; INTRAVENOUS; SUBCUTANEOUS ONCE
Status: CANCELLED | OUTPATIENT
Start: 2019-07-02

## 2019-06-18 RX ORDER — DIPHENHYDRAMINE HYDROCHLORIDE 50 MG/ML
50 INJECTION INTRAMUSCULAR; INTRAVENOUS ONCE
Status: CANCELLED | OUTPATIENT
Start: 2019-07-02

## 2019-06-18 RX ORDER — SODIUM CHLORIDE 0.9 % (FLUSH) 0.9 %
5 SYRINGE (ML) INJECTION PRN
Status: CANCELLED | OUTPATIENT
Start: 2019-06-25

## 2019-06-18 RX ORDER — SODIUM CHLORIDE 0.9 % (FLUSH) 0.9 %
10 SYRINGE (ML) INJECTION PRN
Status: DISCONTINUED | OUTPATIENT
Start: 2019-06-18 | End: 2019-06-19 | Stop reason: HOSPADM

## 2019-06-18 RX ORDER — EPINEPHRINE 1 MG/ML
0.3 INJECTION, SOLUTION, CONCENTRATE INTRAVENOUS PRN
Status: CANCELLED | OUTPATIENT
Start: 2019-06-18

## 2019-06-18 RX ORDER — DEXAMETHASONE SODIUM PHOSPHATE 10 MG/ML
10 INJECTION, SOLUTION INTRAMUSCULAR; INTRAVENOUS ONCE
Status: CANCELLED | OUTPATIENT
Start: 2019-06-25

## 2019-06-18 RX ORDER — MEPERIDINE HYDROCHLORIDE 25 MG/ML
12.5 INJECTION INTRAMUSCULAR; INTRAVENOUS; SUBCUTANEOUS ONCE
Status: CANCELLED | OUTPATIENT
Start: 2019-06-18

## 2019-06-18 RX ORDER — DIPHENHYDRAMINE HYDROCHLORIDE 50 MG/ML
50 INJECTION INTRAMUSCULAR; INTRAVENOUS ONCE
Status: CANCELLED | OUTPATIENT
Start: 2019-06-18

## 2019-06-18 RX ORDER — METHYLPREDNISOLONE SODIUM SUCCINATE 125 MG/2ML
125 INJECTION, POWDER, LYOPHILIZED, FOR SOLUTION INTRAMUSCULAR; INTRAVENOUS ONCE
Status: CANCELLED | OUTPATIENT
Start: 2019-06-18

## 2019-06-18 RX ORDER — SODIUM CHLORIDE 9 MG/ML
20 INJECTION, SOLUTION INTRAVENOUS ONCE
Status: CANCELLED | OUTPATIENT
Start: 2019-07-02

## 2019-06-18 RX ORDER — SODIUM CHLORIDE 0.9 % (FLUSH) 0.9 %
10 SYRINGE (ML) INJECTION PRN
Status: CANCELLED | OUTPATIENT
Start: 2019-06-18

## 2019-06-18 RX ORDER — DEXAMETHASONE SODIUM PHOSPHATE 10 MG/ML
10 INJECTION INTRAMUSCULAR; INTRAVENOUS ONCE
Status: COMPLETED | OUTPATIENT
Start: 2019-06-18 | End: 2019-06-18

## 2019-06-18 RX ORDER — 0.9 % SODIUM CHLORIDE 0.9 %
10 VIAL (ML) INJECTION ONCE
Status: CANCELLED | OUTPATIENT
Start: 2019-06-18

## 2019-06-18 RX ORDER — MEPERIDINE HYDROCHLORIDE 25 MG/ML
12.5 INJECTION INTRAMUSCULAR; INTRAVENOUS; SUBCUTANEOUS ONCE
Status: CANCELLED | OUTPATIENT
Start: 2019-06-25

## 2019-06-18 RX ORDER — DIPHENHYDRAMINE HYDROCHLORIDE 50 MG/ML
50 INJECTION INTRAMUSCULAR; INTRAVENOUS ONCE
Status: CANCELLED | OUTPATIENT
Start: 2019-06-25

## 2019-06-18 RX ORDER — METHYLPREDNISOLONE SODIUM SUCCINATE 125 MG/2ML
125 INJECTION, POWDER, LYOPHILIZED, FOR SOLUTION INTRAMUSCULAR; INTRAVENOUS ONCE
Status: CANCELLED | OUTPATIENT
Start: 2019-07-02

## 2019-06-18 RX ORDER — 0.9 % SODIUM CHLORIDE 0.9 %
10 VIAL (ML) INJECTION ONCE
Status: CANCELLED | OUTPATIENT
Start: 2019-07-02

## 2019-06-18 RX ORDER — SODIUM CHLORIDE 0.9 % (FLUSH) 0.9 %
10 SYRINGE (ML) INJECTION PRN
Status: CANCELLED | OUTPATIENT
Start: 2019-06-25

## 2019-06-18 RX ORDER — EPINEPHRINE 1 MG/ML
0.3 INJECTION, SOLUTION, CONCENTRATE INTRAVENOUS PRN
Status: CANCELLED | OUTPATIENT
Start: 2019-06-25

## 2019-06-18 RX ORDER — SODIUM CHLORIDE 9 MG/ML
INJECTION, SOLUTION INTRAVENOUS CONTINUOUS
Status: CANCELLED | OUTPATIENT
Start: 2019-06-25

## 2019-06-18 RX ORDER — HEPARIN SODIUM (PORCINE) LOCK FLUSH IV SOLN 100 UNIT/ML 100 UNIT/ML
500 SOLUTION INTRAVENOUS PRN
Status: CANCELLED | OUTPATIENT
Start: 2019-07-02

## 2019-06-18 RX ORDER — HEPARIN SODIUM (PORCINE) LOCK FLUSH IV SOLN 100 UNIT/ML 100 UNIT/ML
500 SOLUTION INTRAVENOUS PRN
Status: DISCONTINUED | OUTPATIENT
Start: 2019-06-18 | End: 2019-06-19 | Stop reason: HOSPADM

## 2019-06-18 RX ORDER — SODIUM CHLORIDE 9 MG/ML
INJECTION, SOLUTION INTRAVENOUS CONTINUOUS
Status: CANCELLED | OUTPATIENT
Start: 2019-07-02

## 2019-06-18 RX ORDER — HEPARIN SODIUM (PORCINE) LOCK FLUSH IV SOLN 100 UNIT/ML 100 UNIT/ML
500 SOLUTION INTRAVENOUS PRN
Status: CANCELLED | OUTPATIENT
Start: 2019-06-25

## 2019-06-18 RX ORDER — 0.9 % SODIUM CHLORIDE 0.9 %
10 VIAL (ML) INJECTION ONCE
Status: CANCELLED | OUTPATIENT
Start: 2019-06-25

## 2019-06-18 RX ORDER — SODIUM CHLORIDE 9 MG/ML
20 INJECTION, SOLUTION INTRAVENOUS ONCE
Status: CANCELLED | OUTPATIENT
Start: 2019-06-25

## 2019-06-18 RX ORDER — METHYLPREDNISOLONE SODIUM SUCCINATE 125 MG/2ML
125 INJECTION, POWDER, LYOPHILIZED, FOR SOLUTION INTRAMUSCULAR; INTRAVENOUS ONCE
Status: CANCELLED | OUTPATIENT
Start: 2019-06-25

## 2019-06-18 RX ORDER — SODIUM CHLORIDE 0.9 % (FLUSH) 0.9 %
5 SYRINGE (ML) INJECTION PRN
Status: CANCELLED | OUTPATIENT
Start: 2019-07-02

## 2019-06-18 RX ORDER — SODIUM CHLORIDE 0.9 % (FLUSH) 0.9 %
10 SYRINGE (ML) INJECTION PRN
Status: CANCELLED | OUTPATIENT
Start: 2019-07-02

## 2019-06-18 RX ORDER — DEXAMETHASONE SODIUM PHOSPHATE 10 MG/ML
10 INJECTION, SOLUTION INTRAMUSCULAR; INTRAVENOUS ONCE
Status: CANCELLED | OUTPATIENT
Start: 2019-07-02

## 2019-06-18 RX ORDER — DEXAMETHASONE SODIUM PHOSPHATE 10 MG/ML
10 INJECTION, SOLUTION INTRAMUSCULAR; INTRAVENOUS ONCE
Status: CANCELLED | OUTPATIENT
Start: 2019-06-18

## 2019-06-18 RX ORDER — SODIUM CHLORIDE 9 MG/ML
20 INJECTION, SOLUTION INTRAVENOUS ONCE
Status: CANCELLED | OUTPATIENT
Start: 2019-06-18

## 2019-06-18 RX ORDER — HEPARIN SODIUM (PORCINE) LOCK FLUSH IV SOLN 100 UNIT/ML 100 UNIT/ML
500 SOLUTION INTRAVENOUS PRN
Status: CANCELLED | OUTPATIENT
Start: 2019-06-18

## 2019-06-18 RX ORDER — SODIUM CHLORIDE 9 MG/ML
INJECTION, SOLUTION INTRAVENOUS CONTINUOUS
Status: CANCELLED | OUTPATIENT
Start: 2019-06-18

## 2019-06-18 RX ORDER — DIPHENHYDRAMINE HYDROCHLORIDE 50 MG/ML
50 INJECTION INTRAMUSCULAR; INTRAVENOUS ONCE
Status: COMPLETED | OUTPATIENT
Start: 2019-06-18 | End: 2019-06-18

## 2019-06-18 RX ORDER — SODIUM CHLORIDE 9 MG/ML
250 INJECTION, SOLUTION INTRAVENOUS ONCE
Status: COMPLETED | OUTPATIENT
Start: 2019-06-18 | End: 2019-06-18

## 2019-06-18 RX ORDER — SODIUM CHLORIDE 0.9 % (FLUSH) 0.9 %
5 SYRINGE (ML) INJECTION PRN
Status: CANCELLED | OUTPATIENT
Start: 2019-06-18

## 2019-06-18 RX ORDER — 0.9 % SODIUM CHLORIDE 0.9 %
10 VIAL (ML) INJECTION ONCE
Status: COMPLETED | OUTPATIENT
Start: 2019-06-18 | End: 2019-06-18

## 2019-06-18 RX ORDER — EPINEPHRINE 1 MG/ML
0.3 INJECTION, SOLUTION, CONCENTRATE INTRAVENOUS PRN
Status: CANCELLED | OUTPATIENT
Start: 2019-07-02

## 2019-06-18 RX ADMIN — Medication 10 ML: at 10:12

## 2019-06-18 RX ADMIN — DEXAMETHASONE SODIUM PHOSPHATE 10 MG: 10 INJECTION INTRAMUSCULAR; INTRAVENOUS at 10:05

## 2019-06-18 RX ADMIN — PACLITAXEL 132 MG: 6 INJECTION, SOLUTION INTRAVENOUS at 11:58

## 2019-06-18 RX ADMIN — Medication 10 ML: at 11:55

## 2019-06-18 RX ADMIN — SODIUM CHLORIDE, PRESERVATIVE FREE 500 UNITS: 5 INJECTION INTRAVENOUS at 13:07

## 2019-06-18 RX ADMIN — FAMOTIDINE 20 MG: 10 INJECTION, SOLUTION INTRAVENOUS at 10:05

## 2019-06-18 RX ADMIN — SODIUM CHLORIDE 250 ML: 9 INJECTION, SOLUTION INTRAVENOUS at 10:04

## 2019-06-18 RX ADMIN — DIPHENHYDRAMINE HYDROCHLORIDE 50 MG: 50 INJECTION, SOLUTION INTRAMUSCULAR; INTRAVENOUS at 10:10

## 2019-06-18 RX ADMIN — Medication 10 ML: at 13:07

## 2019-06-18 RX ADMIN — TRASTUZUMAB 357 MG: 150 INJECTION, POWDER, LYOPHILIZED, FOR SOLUTION INTRAVENOUS at 10:29

## 2019-06-18 NOTE — PROGRESS NOTES
[address] MED ONCOLOGY  [address] [facility] [address]  Dept: [phone]  Attending Progress Note      Reason for Visit:   Right Breast Cancer. Referring Physician:  Cruz Noriega MD    PCP:  Roland Davila MD    History of Present Illness: The patient is a 58-year-old lady with a PMH significant for hyperlipidemia, who had presented with an abnormal screening Mammogram, was done on 3/12/2019, revealing calcifications in the right UOQ, she had a right breast US done on 3/13/66590, no masses were seen, she underwent on 3/29/2019 a stereotactic biopsy of the right breast calcifications. Due to biopsy needle malfunction and lack of tissue obtained,  a clip was not placed at the time of biopsy, path:    Breast, right, site not further specified, core needle biopsy:  Infiltrating ductal carcinoma, moderately differentiated. Ductal carcinoma in situ, comedo type, high nuclear grade. Comment:     Intradepartmental consultation is obtained.  Infiltrating  ductal carcinoma corresponds to architectural grade 3, nuclear grade 2,  mitotic grade 1; overall grade 6/9-moderately differentiated; grade 2. Breast Cancer Marker Studies:  Estrogen Receptors (ER): Positive (80% nuclei staining). Staining intensity = strong. Progesterone Receptors ([place]): Positive (5% nuclei staining). Staining intensity = weak. Her-2/dougie (c-erb B-2) protein expression:  Positive (3+). She had bilateral Breast MRI done on 4/4/2019 revealing: There is scattered fibroglandular tissue with minimal background  parenchymal enhancement. An irregular, enhancing mass is noted in the  right breast 10:00 which measures 9 x 6 x 7 mm (image 43 of the axial  T1 postcontrast series). No suspicious mass or non-mass enhancement  seen on the left. There is no lymphadenopathy. Bilateral skin and  nipple areolar complexes are normal. Visualized portions of the chest  and abdomen are unremarkable. Impression  1. therapy. Pathologic stage: pT1a-0.2 cm maximum dimension. Regional lymph nodes: (sn) pN0-no regional lymph node metastasis. Distant metastasis: pMX-cannot be assessed. Ancillary studies: Performed on the patient's previous biopsy specimen  (TTK-; 3/29/2019). Microcalcifications: Present in DCIS. Additional pathologic findings: Fibrocystic change.  Previous  biopsy/excision site. The patient has a small tumor, HER-2/dougie and hormone receptor positive, given her young age and excellent health, she was not comfortable with adjuvant treatment with only hormonal therapy. The patient was started on adjuvant chemotherapy with Taxol and Herceptin on 5/28/2019, she is doing well overall, no tingling or numbness of the hands or the feet. Review of Systems;  CONSTITUTIONAL: No fever, chills. Good appetite and energy level. ENMT: Eyes: No diplopia; Nose: No epistaxis. Mouth: No sore throat. RESPIRATORY: No hemoptysis, shortness of breath, cough. CARDIOVASCULAR: No chest pain, palpitations. GASTROINTESTINAL: No nausea/vomiting, abdominal pain, diarrhea/constipation. GENITOURINARY: No dysuria, urinary frequency, hematuria. NEURO: No syncope, presyncope, headache. MSK: she has chronic pain in the back and right hip.   Remainder:  ROS NEGATIVE    Past Medical History:      Diagnosis Date    Cancer [facility])     malignant lesion in Right breast - for OR 4-18-19     Hyperlipidemia     no medications     Post-operative state 5/30/2019     Patient Active Problem List   Diagnosis    Infiltrating ductal carcinoma of right breast, stage 1 (HCC)    Post-operative state        Past Surgical History:      Procedure Laterality Date    BREAST BIOPSY      BREAST BIOPSY Right 4/18/2019    RIGHT PARTIAL MASTECTOMY WITH NEEDLE LOCALIZATION AND SENTINEL NODE performed by Elidia Aguilar MD at [facility] 13 / REMOVAL / REPLACEMENT VENOUS ACCESS CATHETER N/A 2019    PORT INSERTION performed by Isabel Pleitez MD at [facility] OR       Family History:  Family History   Problem Relation Age of Onset    Breast Cancer Mother     Cancer Father         esophageal, stomach    Cancer Paternal Grandfather         colon       Medications:  Reviewed and reconciled. Social History:  Social History     Socioeconomic History    Marital status:      Spouse name: Not on file    Number of children: Not on file    Years of education: Not on file    Highest education level: Not on file   Occupational History    Not on file   Social Needs    Financial resource strain: Not on file    Food insecurity:     Worry: Not on file     Inability: Not on file    Transportation needs:     Medical: Not on file     Non-medical: Not on file   Tobacco Use    Smoking status: Never Smoker    Smokeless tobacco: Never Used   Substance and Sexual Activity    Alcohol use: Not Currently    Drug use: Never    Sexual activity: Yes     Partners: Male   Lifestyle    Physical activity:     Days per week: Not on file     Minutes per session: Not on file    Stress: Not on file   Relationships    Social connections:     Talks on phone: Not on file     Gets together: Not on file     Attends Latter-day service: Not on file     Active member of club or organization: Not on file     Attends meetings of clubs or organizations: Not on file     Relationship status: Not on file    Intimate partner violence:     Fear of current or ex partner: Not on file     Emotionally abused: Not on file     Physically abused: Not on file     Forced sexual activity: Not on file   Other Topics Concern    Not on file   Social History Narrative    Not on file       Allergies:  No Known Allergies     OB/GYN:  The patient is , she had her menarche at the age of 9-12, she had a hysterectomy, no oophorectomies, 1st live birth was at 20, she used OCPs for about 10 years.     Physical Exam:  /70 (Site: Left Upper Arm, Position: Sitting, Cuff Size: Medium Adult)   Pulse 72   Temp 98.5 °F (36.9 °C)   Ht 5' 3\" (1.6 m)   Wt 127 lb 9.6 oz (57.9 kg)   BMI 22.60 kg/m²   GENERAL: Alert, oriented x 3, not in acute distress. HEENT: PERRLA; EOMI. Oropharynx clear. NECK: Supple. No palpable cervical or supraclavicular lymphadenopathy. LUNGS: Good air entry bilaterally. No wheezing, crackles or rhonchi. BREASTS: The left breast exam is negative for any skin changes, no nipple discharge, no palpable masses, no palpable left axillary lymphadenopathy, the right breast exam is negative for an incision from her recent lumpectomy, it is helaing nicely,  no palpable masses, no skin erythema, no palpable right axillary lymphadenopathy. CHEST: right sided port, mild bruising. CARDIOVASCULAR: Regular rate. No murmurs, rubs or gallops. ABDOMEN: Soft. Non-tender, non-distended. Positive bowel sounds. EXTREMITIES: Without clubbing, cyanosis, or edema. NEUROLOGIC: No focal deficits. ECOG PS 0    Impression/Plan:      The patient is a 48-year-old postmenopausal lady with a PMH significant for hyperlipidemia, who had presented with an abnormal screening Mammogram, she was diagnosed with a right breast invasive ductal carcinoma, grade 2, measuring 9 mm, ER positive, 80% IA +5% HER-2/dougie positive, 3+ by IHC. Patient with clinical stage [id number] disease, she underwent on 4/18/2019 a right breast needle localized lumpectomy, with SLN excisional biopsy, tumor size is 2mm, grade 2, with associated DCIS, measuring 7mm, high grade, comedo type with central necrosis, margins are negative, 4 SN were removed, they were all neg for metastatic disease, final pathologic stage pT1a(sn)pN0M0, prognostic stage IA disease. I discussed with the patient and her spouse pathology results, stage and prognosis, adjuvant RT and adjuvant ET with an AI are recommended.  The patient has a small Her2 pos disease (<5mm), I reviewed with her the nccn guidelines and the risks of the  Chemotherapy, she is young and healthy, she is very nervous about the diagnosis of breast cancer, she is very well informed about her disease, she prefers to be treated, JAGDISH SHAFER was originally planned, but given the size of the tumor (<5mm), decided to treat with Taxol (weekly for 12 weeks) with herceptin, the side effects of the treatment including were discussed with the patient, 2D echo has been done LVEF is adequate for treatment with Herceptin. Patient with a personal history of breast cancer, family history is positive for breast and ovarian cancer, testing for HBOC is recommended, MyRisk testing was done, neg for clinically significant mutations. She was started adjuvant chemotherapy with Taxol and Herceptin on 5/28/2019. Labs reviewed. Proceed with chemo today 6/18/2019, Dexilant Herceptin, week #4. Mild macrocytosis, ordered by B-12 and folate, B12 is borderline, ordered MMA, antiparietal cells and intrinsic factor antibodies evaluate for pernicious anemia, results are pending, she was started on parenteral vitamin B12 weekly by her PCP. She will be due for a repeat 2 D echo the end of August.    RTC in 1 week with labs, treatment. Thank you for allowing us to participate in the care of Mrs. Quintanilla.     Twin Pacheco MD   HEMATOLOGY/MEDICAL ONCOLOGY  [address] [place] [address] MED ONCOLOGY  [address] [place] [id number]  Dept: [phone]

## 2019-06-21 LAB — INTRINSIC FACTOR AB: 1 AU/ML (ref 0–1.1)

## 2019-06-24 LAB
ABSOLUTE BASO #: 0 10*3/UL (ref 0–0.1)
ABSOLUTE EOS #: 0 10*3/UL (ref 0–0.4)
ABSOLUTE NEUT #: 4.5 10*3/UL (ref 2.3–7.9)
ALBUMIN: 3.6 GM/DL (ref 3.1–4.5)
ALP BLD-CCNC: 58 U/L (ref 45–117)
ALT SERPL-CCNC: 19 U/L (ref 12–78)
AST SERPL-CCNC: 9 IU/L (ref 3–35)
BASOPHILS %: 0.5 % (ref 0–1)
BILIRUB SERPL-MCNC: 0.2 MG/DL (ref 0.2–1)
BUN BLDV-MCNC: 13 MG/DL (ref 7–24)
CALCIUM SERPL-MCNC: 8.7 MG/DL (ref 8.5–10.5)
CHLORIDE BLD-SCNC: 109 MMOL/L (ref 98–107)
CO2: 27 MMOL/L (ref 21–32)
CREAT SERPL-MCNC: 0.67 MG/DL (ref 0.55–1.02)
EOSINOPHILS %: 0.6 % (ref 1–4)
GFR AFRICAN AMERICAN: > 60 ML/MIN
GFR SERPL CREATININE-BSD FRML MDRD: >60 ML/MIN/
GLUCOSE: 96 MG/DL (ref 65–99)
HCT VFR BLD CALC: 34 % (ref 37–47)
HEMOGLOBIN: 10.8 G/DL (ref 12–16)
IMMATURE GRANULOCYTES #: 0.1 10*3/UL (ref 0–0.1)
IMMATURE GRANULOCYTES: 0.8 % (ref 0–1)
LYMPHOCYTE %: 21.7 % (ref 27–41)
LYMPHOCYTES # BLD: 1.4 10*3/UL (ref 1.3–4.4)
MCH RBC QN AUTO: 32 PG (ref 27–31)
MCHC RBC AUTO-ENTMCNC: 31.8 G/DL (ref 33–37)
MCV RBC AUTO: 100.6 FL (ref 81–99)
MONOCYTES # BLD: 0.5 10*3/UL (ref 0.1–1)
MONOCYTES %: 7 % (ref 3–9)
NEUTROPHILS %: 69.4 % (ref 47–73)
NUCLEATED RED BLOOD CELLS: 0 % (ref 0–0)
PDW BLD-RTO: 14.4 % (ref 0–14.5)
PLATELET # BLD: 319 10*3/UL (ref 130–400)
PMV BLD AUTO: 10.1 FL (ref 9.6–12.3)
POTASSIUM SERPL-SCNC: 4.2 MMOL/L (ref 3.5–5.1)
RBC # BLD: 3.38 10*6/UL (ref 4.1–5.1)
SODIUM BLD-SCNC: 144 MMOL/L (ref 136–145)
TOTAL PROTEIN: 7.2 GM/DL (ref 6.4–8.2)
WBC # BLD: 6.4 10*3/UL (ref 4.8–10.8)

## 2019-06-25 ENCOUNTER — OFFICE VISIT (OUTPATIENT)
Dept: ONCOLOGY | Age: 54
End: 2019-06-25
Payer: COMMERCIAL

## 2019-06-25 ENCOUNTER — HOSPITAL ENCOUNTER (OUTPATIENT)
Dept: INFUSION THERAPY | Age: 54
Discharge: HOME | End: 2019-06-25
Payer: COMMERCIAL

## 2019-06-25 VITALS
HEART RATE: 84 BPM | WEIGHT: 126.4 LBS | HEIGHT: 63 IN | DIASTOLIC BLOOD PRESSURE: 57 MMHG | TEMPERATURE: 98.9 F | BODY MASS INDEX: 22.39 KG/M2 | SYSTOLIC BLOOD PRESSURE: 117 MMHG

## 2019-06-25 VITALS
HEART RATE: 75 BPM | DIASTOLIC BLOOD PRESSURE: 54 MMHG | RESPIRATION RATE: 18 BRPM | TEMPERATURE: 98.8 F | SYSTOLIC BLOOD PRESSURE: 114 MMHG

## 2019-06-25 DIAGNOSIS — C50.911 INFILTRATING DUCTAL CARCINOMA OF RIGHT BREAST, STAGE 1 (HCC): Primary | ICD-10-CM

## 2019-06-25 DIAGNOSIS — E53.8 VITAMIN B12 DEFICIENCY: ICD-10-CM

## 2019-06-25 PROCEDURE — 2500000003 HC RX 250 WO HCPCS: Performed by: INTERNAL MEDICINE

## 2019-06-25 PROCEDURE — 2580000003 HC RX 258: Performed by: INTERNAL MEDICINE

## 2019-06-25 PROCEDURE — 96375 TX/PRO/DX INJ NEW DRUG ADDON: CPT

## 2019-06-25 PROCEDURE — 6360000002 HC RX W HCPCS: Performed by: INTERNAL MEDICINE

## 2019-06-25 PROCEDURE — 96413 CHEMO IV INFUSION 1 HR: CPT

## 2019-06-25 PROCEDURE — 99214 OFFICE O/P EST MOD 30 MIN: CPT | Performed by: INTERNAL MEDICINE

## 2019-06-25 RX ORDER — HEPARIN SODIUM (PORCINE) LOCK FLUSH IV SOLN 100 UNIT/ML 100 UNIT/ML
500 SOLUTION INTRAVENOUS PRN
Status: DISCONTINUED | OUTPATIENT
Start: 2019-06-25 | End: 2019-06-26 | Stop reason: HOSPADM

## 2019-06-25 RX ORDER — DIPHENHYDRAMINE HYDROCHLORIDE 50 MG/ML
50 INJECTION INTRAMUSCULAR; INTRAVENOUS ONCE
Status: COMPLETED | OUTPATIENT
Start: 2019-06-25 | End: 2019-06-25

## 2019-06-25 RX ORDER — SODIUM CHLORIDE 0.9 % (FLUSH) 0.9 %
10 SYRINGE (ML) INJECTION PRN
Status: DISCONTINUED | OUTPATIENT
Start: 2019-06-25 | End: 2019-06-26 | Stop reason: HOSPADM

## 2019-06-25 RX ORDER — DEXAMETHASONE SODIUM PHOSPHATE 10 MG/ML
10 INJECTION INTRAMUSCULAR; INTRAVENOUS ONCE
Status: COMPLETED | OUTPATIENT
Start: 2019-06-25 | End: 2019-06-25

## 2019-06-25 RX ORDER — SODIUM CHLORIDE 9 MG/ML
20 INJECTION, SOLUTION INTRAVENOUS ONCE
Status: DISCONTINUED | OUTPATIENT
Start: 2019-06-25 | End: 2019-06-26 | Stop reason: HOSPADM

## 2019-06-25 RX ORDER — HEPARIN SODIUM (PORCINE) LOCK FLUSH IV SOLN 100 UNIT/ML 100 UNIT/ML
500 SOLUTION INTRAVENOUS PRN
Status: CANCELLED | OUTPATIENT
Start: 2019-06-25

## 2019-06-25 RX ORDER — 0.9 % SODIUM CHLORIDE 0.9 %
10 VIAL (ML) INJECTION ONCE
Status: COMPLETED | OUTPATIENT
Start: 2019-06-25 | End: 2019-06-25

## 2019-06-25 RX ORDER — SODIUM CHLORIDE 0.9 % (FLUSH) 0.9 %
10 SYRINGE (ML) INJECTION PRN
Status: CANCELLED | OUTPATIENT
Start: 2019-06-25

## 2019-06-25 RX ADMIN — Medication 10 ML: at 10:14

## 2019-06-25 RX ADMIN — Medication 10 ML: at 10:27

## 2019-06-25 RX ADMIN — FAMOTIDINE 20 MG: 10 INJECTION, SOLUTION INTRAVENOUS at 10:27

## 2019-06-25 RX ADMIN — SODIUM CHLORIDE 20 ML/HR: 9 INJECTION, SOLUTION INTRAVENOUS at 10:14

## 2019-06-25 RX ADMIN — DIPHENHYDRAMINE HYDROCHLORIDE 50 MG: 50 INJECTION, SOLUTION INTRAMUSCULAR; INTRAVENOUS at 10:32

## 2019-06-25 RX ADMIN — PACLITAXEL 132 MG: 6 INJECTION, SOLUTION INTRAVENOUS at 11:15

## 2019-06-25 RX ADMIN — Medication 10 ML: at 10:39

## 2019-06-25 RX ADMIN — Medication 10 ML: at 12:25

## 2019-06-25 RX ADMIN — SODIUM CHLORIDE, PRESERVATIVE FREE 500 UNITS: 5 INJECTION INTRAVENOUS at 12:25

## 2019-06-25 RX ADMIN — DEXAMETHASONE SODIUM PHOSPHATE 10 MG: 10 INJECTION INTRAMUSCULAR; INTRAVENOUS at 10:39

## 2019-06-25 RX ADMIN — Medication 10 ML: at 10:32

## 2019-06-25 RX ADMIN — Medication 10 ML: at 11:14

## 2019-06-27 NOTE — PROGRESS NOTES
[address] MED ONCOLOGY  [facility] [address]  Dept: [phone]  Attending Progress Note      Reason for Visit:   Right Breast Cancer. Referring Physician:  Deondre Donald MD    PCP:  Leander Turner MD    History of Present Illness: The patient is a 50-year-old lady with a PMH significant for hyperlipidemia, who had presented with an abnormal screening Mammogram, was done on 3/12/2019, revealing calcifications in the right UOQ, she had a right breast US done on 3/13/86153, no masses were seen, she underwent on 3/29/2019 a stereotactic biopsy of the right breast calcifications. Due to biopsy needle malfunction and lack of tissue obtained,  a clip was not placed at the time of biopsy, path:    Breast, right, site not further specified, core needle biopsy:  Infiltrating ductal carcinoma, moderately differentiated. Ductal carcinoma in situ, comedo type, high nuclear grade. Comment:     Intradepartmental consultation is obtained.  Infiltrating  ductal carcinoma corresponds to architectural grade 3, nuclear grade 2,  mitotic grade 1; overall grade 6/9-moderately differentiated; grade 2. Breast Cancer Marker Studies:  Estrogen Receptors (ER): Positive (80% nuclei staining). Staining intensity = strong. Progesterone Receptors (AZ): Positive (5% nuclei staining). Staining intensity = weak. Her-2/dougie (c-erb B-2) protein expression:  Positive (3+). She had bilateral Breast MRI done on 4/4/2019 revealing: There is scattered fibroglandular tissue with minimal background  parenchymal enhancement. An irregular, enhancing mass is noted in the  right breast 10:00 which measures 9 x 6 x 7 mm (image 43 of the axial  T1 postcontrast series). No suspicious mass or non-mass enhancement  seen on the left. There is no lymphadenopathy. Bilateral skin and  nipple areolar complexes are normal. Visualized portions of the chest  and abdomen are unremarkable. Impression  1. Irregular, enhancing mass in the right breast 10:00 is consistent  with biopsy-proven neoplasm. No additional foci of disease identified  in the right breast.  2. There is no lymphadenopathy. 3. No evidence of neoplasm on the left. RECOMMENDATION:  Continued surgical and oncologic consultation is advised. BIRADS 6: Known neoplasm    The patient underwent on 4/18/2019 a right breast needle localized lumpectomy, with SLN excisional biopsy, path:  CANCER CASE SUMMARY  Procedure: Excision (less than total mastectomy; needle localization  partial mastectomy [lumpectomy]). Specimen laterality: Right. Tumor site: Not specified. Tumor size: 0.2 cm (invasive component). Histologic type: Invasive ductal carcinoma. Histologic grade: Glandular/tubular differentiation-score = 3; nuclear  pleomorphism-score = 2; mitotic rate-score = 1.  Overall grade 2;  6/9-moderately differentiated. Tumor focality: Single focus of invasive carcinoma. Ductal carcinoma in situ: Present. Size/extent of DCIS: 0.7 cm.  DCIS completely surrounds a small, residual  focus of infiltrating ductal carcinoma in the current specimen. Architectural pattern: Comedo type. Nuclear grade: Grade III (high nuclear grade). Necrosis: Present, Central (expansive \"comedo\" necrosis). Microcalcifications: Present. Margins: Invasive carcinoma margins-uninvolved by invasive carcinoma. Distance from closest margin-0.9 cm (inferior inked margin). DCIS margins-uninvolved by DCIS.  Distance from closest margin-0.3 cm  (anterior inked margin of resection-specimen A). Note: Specimen C  represents additional anterior/superior breast tissue [3.0 x 2.5 x 1.0  cm] which represents larger final margin of resection for DCIS with  regard to anterior margin of resection). Regional lymph nodes: Uninvolved by tumor cells.  Number of lymph nodes  examined-4 (designated sentinel lymph nodes).  Number of lymph nodes  involved-0.   Treatment effect: No known presurgical  HYSTERECTOMY      INSERTION / REMOVAL / REPLACEMENT VENOUS ACCESS CATHETER N/A 2019    PORT INSERTION performed by Staci Condon MD at [facility] OR       Family History:  Family History   Problem Relation Age of Onset    Breast Cancer Mother     Cancer Father         esophageal, stomach    Cancer Paternal Grandfather         colon       Medications:  Reviewed and reconciled.     Social History:  Social History     Socioeconomic History    Marital status:      Spouse name: Not on file    Number of children: Not on file    Years of education: Not on file    Highest education level: Not on file   Occupational History    Not on file   Social Needs    Financial resource strain: Not on file    Food insecurity:     Worry: Not on file     Inability: Not on file    Transportation needs:     Medical: Not on file     Non-medical: Not on file   Tobacco Use    Smoking status: Never Smoker    Smokeless tobacco: Never Used   Substance and Sexual Activity    Alcohol use: Not Currently    Drug use: Never    Sexual activity: Yes     Partners: Male   Lifestyle    Physical activity:     Days per week: Not on file     Minutes per session: Not on file    Stress: Not on file   Relationships    Social connections:     Talks on phone: Not on file     Gets together: Not on file     Attends Hinduism service: Not on file     Active member of club or organization: Not on file     Attends meetings of clubs or organizations: Not on file     Relationship status: Not on file    Intimate partner violence:     Fear of current or ex partner: Not on file     Emotionally abused: Not on file     Physically abused: Not on file     Forced sexual activity: Not on file   Other Topics Concern    Not on file   Social History Narrative    Not on file       Allergies:  No Known Allergies     OB/GYN:  The patient is , she had her menarche at the age of 9-12, she had a hysterectomy, no oophorectomies, 1st live birth was at 20, she used OCPs for about 10 years. Physical Exam:  BP (!) 117/57 (Site: Left Upper Arm, Position: Sitting, Cuff Size: Medium Adult)   Pulse 84   Temp 98.9 °F (37.2 °C) (Temporal)   Ht 5' 3\" (1.6 m)   Wt 126 lb 6.4 oz (57.3 kg)   BMI 22.39 kg/m²   GENERAL: Alert, oriented x 3, not in acute distress. HEENT: PERRLA; EOMI. Oropharynx clear. NECK: Supple. No palpable cervical or supraclavicular lymphadenopathy. LUNGS: Good air entry bilaterally. No wheezing, crackles or rhonchi. BREASTS: The left breast exam is negative for any skin changes, no nipple discharge, no palpable masses, no palpable left axillary lymphadenopathy, the right breast exam is negative for an incision from her recent lumpectomy, it is helaing nicely,  no palpable masses, no skin erythema, no palpable right axillary lymphadenopathy. CHEST: right sided port, mild bruising. CARDIOVASCULAR: Regular rate. No murmurs, rubs or gallops. ABDOMEN: Soft. Non-tender, non-distended. Positive bowel sounds. EXTREMITIES: Without clubbing, cyanosis, or edema. NEUROLOGIC: No focal deficits. ECOG PS 0    Impression/Plan:      The patient is a 59-year-old postmenopausal lady with a PMH significant for hyperlipidemia, who had presented with an abnormal screening Mammogram, she was diagnosed with a right breast invasive ductal carcinoma, grade 2, measuring 9 mm, ER positive, 80% ND +5% HER-2/dougie positive, 3+ by IHC. Patient with clinical stage [id number] disease, she underwent on 4/18/2019 a right breast needle localized lumpectomy, with SLN excisional biopsy, tumor size is 2mm, grade 2, with associated DCIS, measuring 7mm, high grade, comedo type with central necrosis, margins are negative, 4 SN were removed, they were all neg for metastatic disease, final pathologic stage pT1a(sn)pN0M0, prognostic stage IA disease.  I discussed with the patient and her spouse pathology results, stage and prognosis, adjuvant RT and adjuvant ET with an AI

## 2019-06-29 PROBLEM — Z98.890 POST-OPERATIVE STATE: Status: RESOLVED | Noted: 2019-05-30 | Resolved: 2019-06-29

## 2019-07-01 LAB
ABSOLUTE BASO #: 0 10*3/UL (ref 0–0.1)
ABSOLUTE EOS #: 0.1 10*3/UL (ref 0–0.4)
ABSOLUTE NEUT #: 3.1 10*3/UL (ref 2.3–7.9)
ALBUMIN: 3.7 GM/DL (ref 3.1–4.5)
ALP BLD-CCNC: 53 U/L (ref 45–117)
ALT SERPL-CCNC: 22 U/L (ref 12–78)
AST SERPL-CCNC: 12 IU/L (ref 3–35)
BASOPHILS %: 0.7 % (ref 0–1)
BILIRUB SERPL-MCNC: 0.2 MG/DL (ref 0.2–1)
BUN BLDV-MCNC: 11 MG/DL (ref 7–24)
CALCIUM SERPL-MCNC: 9.2 MG/DL (ref 8.5–10.5)
CHLORIDE BLD-SCNC: 110 MMOL/L (ref 98–107)
CO2: 27 MMOL/L (ref 21–32)
CREAT SERPL-MCNC: 0.69 MG/DL (ref 0.55–1.02)
EOSINOPHILS %: 0.9 % (ref 1–4)
GFR AFRICAN AMERICAN: > 60 ML/MIN
GFR SERPL CREATININE-BSD FRML MDRD: >60 ML/MIN/
GLUCOSE: 94 MG/DL (ref 65–99)
HCT VFR BLD CALC: 35.4 % (ref 37–47)
HEMOGLOBIN: 11.2 G/DL (ref 12–16)
IMMATURE GRANULOCYTES #: 0 10*3/UL (ref 0–0.1)
IMMATURE GRANULOCYTES: 0.5 % (ref 0–1)
LYMPHOCYTE %: 36.9 % (ref 27–41)
LYMPHOCYTES # BLD: 2.1 10*3/UL (ref 1.3–4.4)
MCH RBC QN AUTO: 32.5 PG (ref 27–31)
MCHC RBC AUTO-ENTMCNC: 31.6 G/DL (ref 33–37)
MCV RBC AUTO: 102.6 FL (ref 81–99)
MONOCYTES # BLD: 0.4 10*3/UL (ref 0.1–1)
MONOCYTES %: 7.4 % (ref 3–9)
NEUTROPHILS %: 53.6 % (ref 47–73)
NUCLEATED RED BLOOD CELLS: 0 % (ref 0–0)
PDW BLD-RTO: 14.6 % (ref 0–14.5)
PLATELET # BLD: 336 10*3/UL (ref 130–400)
PMV BLD AUTO: 9.8 FL (ref 9.6–12.3)
POTASSIUM SERPL-SCNC: 4.1 MMOL/L (ref 3.5–5.1)
RBC # BLD: 3.45 10*6/UL (ref 4.1–5.1)
SODIUM BLD-SCNC: 142 MMOL/L (ref 136–145)
TOTAL PROTEIN: 7.2 GM/DL (ref 6.4–8.2)
WBC # BLD: 5.8 10*3/UL (ref 4.8–10.8)

## 2019-07-02 ENCOUNTER — OFFICE VISIT (OUTPATIENT)
Dept: ONCOLOGY | Age: 54
End: 2019-07-02
Payer: COMMERCIAL

## 2019-07-02 ENCOUNTER — TELEPHONE (OUTPATIENT)
Dept: CASE MANAGEMENT | Age: 54
End: 2019-07-02

## 2019-07-02 ENCOUNTER — HOSPITAL ENCOUNTER (OUTPATIENT)
Dept: INFUSION THERAPY | Age: 54
Discharge: HOME | End: 2019-07-02
Payer: COMMERCIAL

## 2019-07-02 VITALS
RESPIRATION RATE: 20 BRPM | HEART RATE: 72 BPM | TEMPERATURE: 99.4 F | SYSTOLIC BLOOD PRESSURE: 97 MMHG | DIASTOLIC BLOOD PRESSURE: 50 MMHG

## 2019-07-02 VITALS
DIASTOLIC BLOOD PRESSURE: 51 MMHG | BODY MASS INDEX: 22.34 KG/M2 | HEART RATE: 73 BPM | HEIGHT: 63 IN | TEMPERATURE: 98.7 F | SYSTOLIC BLOOD PRESSURE: 104 MMHG | WEIGHT: 126.1 LBS

## 2019-07-02 DIAGNOSIS — C50.911 INFILTRATING DUCTAL CARCINOMA OF RIGHT BREAST, STAGE 1 (HCC): Primary | ICD-10-CM

## 2019-07-02 PROCEDURE — 6360000002 HC RX W HCPCS: Performed by: INTERNAL MEDICINE

## 2019-07-02 PROCEDURE — 2500000003 HC RX 250 WO HCPCS: Performed by: INTERNAL MEDICINE

## 2019-07-02 PROCEDURE — 96375 TX/PRO/DX INJ NEW DRUG ADDON: CPT

## 2019-07-02 PROCEDURE — 96413 CHEMO IV INFUSION 1 HR: CPT

## 2019-07-02 PROCEDURE — 2580000003 HC RX 258: Performed by: INTERNAL MEDICINE

## 2019-07-02 PROCEDURE — 99214 OFFICE O/P EST MOD 30 MIN: CPT | Performed by: INTERNAL MEDICINE

## 2019-07-02 RX ORDER — SODIUM CHLORIDE 9 MG/ML
20 INJECTION, SOLUTION INTRAVENOUS ONCE
Status: COMPLETED | OUTPATIENT
Start: 2019-07-02 | End: 2019-07-02

## 2019-07-02 RX ORDER — HEPARIN SODIUM (PORCINE) LOCK FLUSH IV SOLN 100 UNIT/ML 100 UNIT/ML
500 SOLUTION INTRAVENOUS PRN
Status: CANCELLED | OUTPATIENT
Start: 2019-07-02

## 2019-07-02 RX ORDER — HEPARIN SODIUM (PORCINE) LOCK FLUSH IV SOLN 100 UNIT/ML 100 UNIT/ML
500 SOLUTION INTRAVENOUS PRN
Status: DISCONTINUED | OUTPATIENT
Start: 2019-07-02 | End: 2019-07-03 | Stop reason: HOSPADM

## 2019-07-02 RX ORDER — SODIUM CHLORIDE 0.9 % (FLUSH) 0.9 %
10 SYRINGE (ML) INJECTION PRN
Status: CANCELLED | OUTPATIENT
Start: 2019-07-02

## 2019-07-02 RX ORDER — DEXAMETHASONE SODIUM PHOSPHATE 10 MG/ML
10 INJECTION INTRAMUSCULAR; INTRAVENOUS ONCE
Status: COMPLETED | OUTPATIENT
Start: 2019-07-02 | End: 2019-07-02

## 2019-07-02 RX ORDER — DIPHENHYDRAMINE HYDROCHLORIDE 50 MG/ML
50 INJECTION INTRAMUSCULAR; INTRAVENOUS ONCE
Status: COMPLETED | OUTPATIENT
Start: 2019-07-02 | End: 2019-07-02

## 2019-07-02 RX ORDER — SODIUM CHLORIDE 0.9 % (FLUSH) 0.9 %
10 SYRINGE (ML) INJECTION PRN
Status: DISCONTINUED | OUTPATIENT
Start: 2019-07-02 | End: 2019-07-03 | Stop reason: HOSPADM

## 2019-07-02 RX ADMIN — FAMOTIDINE 20 MG: 10 INJECTION INTRAVENOUS at 09:59

## 2019-07-02 RX ADMIN — DIPHENHYDRAMINE HYDROCHLORIDE 50 MG: 50 INJECTION, SOLUTION INTRAMUSCULAR; INTRAVENOUS at 10:05

## 2019-07-02 RX ADMIN — Medication 10 ML: at 09:43

## 2019-07-02 RX ADMIN — HEPARIN 500 UNITS: 100 SYRINGE at 11:46

## 2019-07-02 RX ADMIN — SODIUM CHLORIDE 20 ML/HR: 9 INJECTION, SOLUTION INTRAVENOUS at 09:50

## 2019-07-02 RX ADMIN — Medication 10 ML: at 09:59

## 2019-07-02 RX ADMIN — Medication 10 ML: at 10:33

## 2019-07-02 RX ADMIN — Medication 10 ML: at 11:46

## 2019-07-02 RX ADMIN — DEXAMETHASONE SODIUM PHOSPHATE 10 MG: 10 INJECTION INTRAMUSCULAR; INTRAVENOUS at 09:52

## 2019-07-02 RX ADMIN — Medication 10 ML: at 09:52

## 2019-07-02 RX ADMIN — PACLITAXEL 132 MG: 6 INJECTION, SOLUTION INTRAVENOUS at 10:33

## 2019-07-02 RX ADMIN — Medication 10 ML: at 10:05

## 2019-07-02 NOTE — PROGRESS NOTES
[address] MED ONCOLOGY  [address] [facility] [address]  Dept: [phone]  Attending Progress Note      Reason for Visit:   Right Breast Cancer. Referring Physician:  Jyoti Trujillo MD    PCP:  Anh Rivers MD    History of Present Illness: The patient is a 62-year-old lady with a PMH significant for hyperlipidemia, who had presented with an abnormal screening Mammogram, was done on 3/12/2019, revealing calcifications in the right UOQ, she had a right breast US done on 3/13/66132, no masses were seen, she underwent on 3/29/2019 a stereotactic biopsy of the right breast calcifications. Due to biopsy needle malfunction and lack of tissue obtained,  a clip was not placed at the time of biopsy, path:    Breast, right, site not further specified, core needle biopsy:  Infiltrating ductal carcinoma, moderately differentiated. Ductal carcinoma in situ, comedo type, high nuclear grade. Comment:     Intradepartmental consultation is obtained.  Infiltrating  ductal carcinoma corresponds to architectural grade 3, nuclear grade 2,  mitotic grade 1; overall grade 6/9-moderately differentiated; grade 2. Breast Cancer Marker Studies:  Estrogen Receptors (ER): Positive (80% nuclei staining). Staining intensity = strong. Progesterone Receptors (SD): Positive (5% nuclei staining). Staining intensity = weak. Her-2/dougie (c-erb B-2) protein expression:  Positive (3+). She had bilateral Breast MRI done on 4/4/2019 revealing: There is scattered fibroglandular tissue with minimal background  parenchymal enhancement. An irregular, enhancing mass is noted in the  right breast 10:00 which measures 9 x 6 x 7 mm (image 43 of the axial  T1 postcontrast series). No suspicious mass or non-mass enhancement  seen on the left. There is no lymphadenopathy. Bilateral skin and  nipple areolar complexes are normal. Visualized portions of the chest  and abdomen are unremarkable. Impression  1. therapy. Pathologic stage: pT1a-0.2 cm maximum dimension. Regional lymph nodes: (sn) pN0-no regional lymph node metastasis. Distant metastasis: pMX-cannot be assessed. Ancillary studies: Performed on the patient's previous biopsy specimen  ([facility]-; 3/29/2019). Microcalcifications: Present in DCIS. Additional pathologic findings: Fibrocystic change.  Previous  biopsy/excision site. The patient has a small tumor, HER-2/dougie and hormone receptor positive, given her young age and excellent health, she was not comfortable with adjuvant treatment with only hormonal therapy. The patient was started on adjuvant chemotherapy with Taxol and Herceptin on 5/28/2019, she is doing well overall, no persistent tingling or numbness of the hands or the feet. He has bilateral ear infection, and is on Levaquin. Review of Systems;  CONSTITUTIONAL: No fever, chills. Good appetite and energy level. ENMT: Eyes: No diplopia; Nose: No epistaxis. Mouth: No sore throat. RESPIRATORY: No hemoptysis, shortness of breath, cough. CARDIOVASCULAR: No chest pain, palpitations. GASTROINTESTINAL: No nausea/vomiting, abdominal pain, diarrhea/constipation. GENITOURINARY: No dysuria, urinary frequency, hematuria. NEURO: No syncope, presyncope, headache. MSK: she has chronic pain in the back and right hip.   Remainder:  ROS NEGATIVE    Past Medical History:      Diagnosis Date    Cancer [facility])     malignant lesion in Right breast - for OR 4-18-19     Hyperlipidemia     no medications     Post-operative state 5/30/2019     Patient Active Problem List   Diagnosis    Infiltrating ductal carcinoma of right breast, stage 1 ([address]        Past Surgical History:      Procedure Laterality Date    BREAST BIOPSY      BREAST BIOPSY Right 4/18/2019    RIGHT PARTIAL MASTECTOMY WITH NEEDLE LOCALIZATION AND SENTINEL NODE performed by Mala Pickens MD at [facility] 13 / REMOVAL Her2 pos disease (<5mm), I reviewed with her the nccn guidelines and the risks of the  Chemotherapy, she is young and healthy, she is very nervous about the diagnosis of breast cancer, she is very well informed about her disease, she prefers to be treated, JAGDISH SHAFER was originally planned, but given the size of the tumor (<5mm), decided to treat with Taxol (weekly for 12 weeks) with herceptin, the side effects of the treatment including were discussed with the patient, 2D echo has been done LVEF is adequate for treatment with Herceptin. Patient with a personal history of breast cancer, family history is positive for breast and ovarian cancer, testing for HBOC is recommended, MyRisk testing was done, neg for clinically significant mutations. She was started adjuvant chemotherapy with Taxol and Herceptin on 5/28/2019. Labs reviewed. Proceed with chemo today 7/2/2019, Taxol, week #6. Mild macrocytosis, ordered by B-12 and folate, B12 is borderline, ordered MMA, it is not elevated, antiparietal cells and intrinsic factor antibodies done to evaluate for pernicious anemia and are negative, she was started on parenteral vitamin B12 by her PCP. She will be due for a repeat 2 D echo the end of August.    RTC in 1 week with labs, treatment. Thank you for allowing us to participate in the care of Mrs. Quintanilla.     Gabby Matta MD   HEMATOLOGY/MEDICAL ONCOLOGY  [address] [place] [address] Rd MED ONCOLOGY  [address] [place] [id number]  Dept: [phone]

## 2019-07-08 RX ORDER — DIPHENHYDRAMINE HYDROCHLORIDE 50 MG/ML
50 INJECTION INTRAMUSCULAR; INTRAVENOUS ONCE
Status: CANCELLED | OUTPATIENT
Start: 2019-07-09

## 2019-07-08 RX ORDER — EPINEPHRINE 1 MG/ML
0.3 INJECTION, SOLUTION, CONCENTRATE INTRAVENOUS PRN
Status: CANCELLED | OUTPATIENT
Start: 2019-07-09

## 2019-07-08 RX ORDER — MEPERIDINE HYDROCHLORIDE 25 MG/ML
12.5 INJECTION INTRAMUSCULAR; INTRAVENOUS; SUBCUTANEOUS ONCE
Status: CANCELLED | OUTPATIENT
Start: 2019-07-09

## 2019-07-08 RX ORDER — 0.9 % SODIUM CHLORIDE 0.9 %
10 VIAL (ML) INJECTION ONCE
Status: CANCELLED | OUTPATIENT
Start: 2019-07-09

## 2019-07-08 RX ORDER — DEXAMETHASONE SODIUM PHOSPHATE 10 MG/ML
10 INJECTION, SOLUTION INTRAMUSCULAR; INTRAVENOUS ONCE
Status: CANCELLED | OUTPATIENT
Start: 2019-07-09

## 2019-07-08 RX ORDER — HEPARIN SODIUM (PORCINE) LOCK FLUSH IV SOLN 100 UNIT/ML 100 UNIT/ML
500 SOLUTION INTRAVENOUS PRN
Status: CANCELLED | OUTPATIENT
Start: 2019-07-09

## 2019-07-08 RX ORDER — SODIUM CHLORIDE 0.9 % (FLUSH) 0.9 %
5 SYRINGE (ML) INJECTION PRN
Status: CANCELLED | OUTPATIENT
Start: 2019-07-09

## 2019-07-08 RX ORDER — SODIUM CHLORIDE 9 MG/ML
INJECTION, SOLUTION INTRAVENOUS CONTINUOUS
Status: CANCELLED | OUTPATIENT
Start: 2019-07-09

## 2019-07-08 RX ORDER — SODIUM CHLORIDE 9 MG/ML
20 INJECTION, SOLUTION INTRAVENOUS ONCE
Status: CANCELLED | OUTPATIENT
Start: 2019-07-09

## 2019-07-08 RX ORDER — METHYLPREDNISOLONE SODIUM SUCCINATE 125 MG/2ML
125 INJECTION, POWDER, LYOPHILIZED, FOR SOLUTION INTRAMUSCULAR; INTRAVENOUS ONCE
Status: CANCELLED | OUTPATIENT
Start: 2019-07-09

## 2019-07-08 RX ORDER — SODIUM CHLORIDE 0.9 % (FLUSH) 0.9 %
10 SYRINGE (ML) INJECTION PRN
Status: CANCELLED | OUTPATIENT
Start: 2019-07-09

## 2019-07-09 ENCOUNTER — HOSPITAL ENCOUNTER (OUTPATIENT)
Dept: INFUSION THERAPY | Age: 54
Discharge: HOME | End: 2019-07-09
Payer: COMMERCIAL

## 2019-07-09 ENCOUNTER — OFFICE VISIT (OUTPATIENT)
Dept: ONCOLOGY | Age: 54
End: 2019-07-09
Payer: COMMERCIAL

## 2019-07-09 VITALS
TEMPERATURE: 97.9 F | DIASTOLIC BLOOD PRESSURE: 45 MMHG | RESPIRATION RATE: 16 BRPM | SYSTOLIC BLOOD PRESSURE: 92 MMHG | HEART RATE: 69 BPM

## 2019-07-09 VITALS
WEIGHT: 131.2 LBS | HEART RATE: 76 BPM | SYSTOLIC BLOOD PRESSURE: 93 MMHG | DIASTOLIC BLOOD PRESSURE: 55 MMHG | BODY MASS INDEX: 23.25 KG/M2 | TEMPERATURE: 97.6 F | HEIGHT: 63 IN

## 2019-07-09 DIAGNOSIS — C50.911 INFILTRATING DUCTAL CARCINOMA OF RIGHT BREAST, STAGE 1 (HCC): Primary | ICD-10-CM

## 2019-07-09 DIAGNOSIS — T45.1X5A CHEMOTHERAPY-INDUCED NAUSEA AND VOMITING: ICD-10-CM

## 2019-07-09 DIAGNOSIS — R11.2 CHEMOTHERAPY-INDUCED NAUSEA AND VOMITING: ICD-10-CM

## 2019-07-09 PROCEDURE — 99214 OFFICE O/P EST MOD 30 MIN: CPT | Performed by: INTERNAL MEDICINE

## 2019-07-09 PROCEDURE — 6360000002 HC RX W HCPCS

## 2019-07-09 PROCEDURE — 96374 THER/PROPH/DIAG INJ IV PUSH: CPT

## 2019-07-09 PROCEDURE — 96375 TX/PRO/DX INJ NEW DRUG ADDON: CPT

## 2019-07-09 PROCEDURE — 2580000003 HC RX 258

## 2019-07-09 PROCEDURE — 2580000003 HC RX 258: Performed by: INTERNAL MEDICINE

## 2019-07-09 PROCEDURE — 2500000003 HC RX 250 WO HCPCS: Performed by: INTERNAL MEDICINE

## 2019-07-09 PROCEDURE — 6360000002 HC RX W HCPCS: Performed by: INTERNAL MEDICINE

## 2019-07-09 PROCEDURE — 96415 CHEMO IV INFUSION ADDL HR: CPT

## 2019-07-09 PROCEDURE — 96413 CHEMO IV INFUSION 1 HR: CPT

## 2019-07-09 RX ORDER — SODIUM CHLORIDE 9 MG/ML
20 INJECTION, SOLUTION INTRAVENOUS ONCE
Status: COMPLETED | OUTPATIENT
Start: 2019-07-09 | End: 2019-07-09

## 2019-07-09 RX ORDER — DIPHENHYDRAMINE HYDROCHLORIDE 50 MG/ML
50 INJECTION INTRAMUSCULAR; INTRAVENOUS ONCE
Status: COMPLETED | OUTPATIENT
Start: 2019-07-09 | End: 2019-07-09

## 2019-07-09 RX ORDER — 0.9 % SODIUM CHLORIDE 0.9 %
10 VIAL (ML) INJECTION ONCE
Status: COMPLETED | OUTPATIENT
Start: 2019-07-09 | End: 2019-07-09

## 2019-07-09 RX ORDER — SODIUM CHLORIDE 0.9 % (FLUSH) 0.9 %
10 SYRINGE (ML) INJECTION PRN
Status: DISCONTINUED | OUTPATIENT
Start: 2019-07-09 | End: 2019-07-10 | Stop reason: HOSPADM

## 2019-07-09 RX ORDER — DEXAMETHASONE 4 MG/1
4 TABLET ORAL DAILY
Qty: 40 TABLET | Refills: 0 | COMMUNITY
Start: 2019-07-09 | End: 2019-11-26 | Stop reason: ALTCHOICE

## 2019-07-09 RX ORDER — DEXAMETHASONE SODIUM PHOSPHATE 10 MG/ML
10 INJECTION INTRAMUSCULAR; INTRAVENOUS ONCE
Status: COMPLETED | OUTPATIENT
Start: 2019-07-09 | End: 2019-07-09

## 2019-07-09 RX ORDER — HEPARIN SODIUM (PORCINE) LOCK FLUSH IV SOLN 100 UNIT/ML 100 UNIT/ML
SOLUTION INTRAVENOUS
Status: COMPLETED
Start: 2019-07-09 | End: 2019-07-09

## 2019-07-09 RX ORDER — SODIUM CHLORIDE 0.9 % (FLUSH) 0.9 %
SYRINGE (ML) INJECTION
Status: COMPLETED
Start: 2019-07-09 | End: 2019-07-09

## 2019-07-09 RX ORDER — HEPARIN SODIUM (PORCINE) LOCK FLUSH IV SOLN 100 UNIT/ML 100 UNIT/ML
500 SOLUTION INTRAVENOUS PRN
Status: DISCONTINUED | OUTPATIENT
Start: 2019-07-09 | End: 2019-07-10 | Stop reason: HOSPADM

## 2019-07-09 RX ADMIN — Medication 10 ML: at 09:20

## 2019-07-09 RX ADMIN — DIPHENHYDRAMINE HYDROCHLORIDE 50 MG: 50 INJECTION, SOLUTION INTRAMUSCULAR; INTRAVENOUS at 09:15

## 2019-07-09 RX ADMIN — TRASTUZUMAB 357 MG: 150 INJECTION, POWDER, LYOPHILIZED, FOR SOLUTION INTRAVENOUS at 09:22

## 2019-07-09 RX ADMIN — Medication 10 ML: at 08:51

## 2019-07-09 RX ADMIN — DEXAMETHASONE SODIUM PHOSPHATE 10 MG: 10 INJECTION INTRAMUSCULAR; INTRAVENOUS at 09:07

## 2019-07-09 RX ADMIN — FAMOTIDINE 20 MG: 10 INJECTION INTRAVENOUS at 09:01

## 2019-07-09 RX ADMIN — PACLITAXEL 132 MG: 6 INJECTION INTRAVENOUS at 10:05

## 2019-07-09 RX ADMIN — HEPARIN 500 UNITS: 100 SYRINGE at 11:31

## 2019-07-09 RX ADMIN — Medication 10 ML: at 09:06

## 2019-07-09 RX ADMIN — SODIUM CHLORIDE 20 ML/HR: 9 INJECTION, SOLUTION INTRAVENOUS at 08:51

## 2019-07-09 RX ADMIN — HEPARIN SODIUM (PORCINE) LOCK FLUSH IV SOLN 100 UNIT/ML 500 UNITS: 100 SOLUTION at 11:31

## 2019-07-09 RX ADMIN — Medication 10 ML: at 09:14

## 2019-07-09 RX ADMIN — Medication 10 ML: at 11:31

## 2019-07-09 NOTE — PROGRESS NOTES
[address] MED ONCOLOGY  [address] [facility] [address]  Dept: [phone]  Attending Progress Note      Reason for Visit:   Right Breast Cancer. Referring Physician:  Abram Zavala MD    PCP:  Adan Sultana MD    History of Present Illness: The patient is a 65-year-old lady with a PMH significant for hyperlipidemia, who had presented with an abnormal screening Mammogram, was done on 3/12/2019, revealing calcifications in the right UOQ, she had a right breast US done on 3/13/13662, no masses were seen, she underwent on 3/29/2019 a stereotactic biopsy of the right breast calcifications. Due to biopsy needle malfunction and lack of tissue obtained,  a clip was not placed at the time of biopsy, path:    Breast, right, site not further specified, core needle biopsy:  Infiltrating ductal carcinoma, moderately differentiated. Ductal carcinoma in situ, comedo type, high nuclear grade. Comment:     Intradepartmental consultation is obtained.  Infiltrating  ductal carcinoma corresponds to architectural grade 3, nuclear grade 2,  mitotic grade 1; overall grade 6/9-moderately differentiated; grade 2. Breast Cancer Marker Studies:  Estrogen Receptors (ER): Positive (80% nuclei staining). Staining intensity = strong. Progesterone Receptors (IA): Positive (5% nuclei staining). Staining intensity = weak. Her-2/dougie (c-erb B-2) protein expression:  Positive (3+). She had bilateral Breast MRI done on 4/4/2019 revealing: There is scattered fibroglandular tissue with minimal background  parenchymal enhancement. An irregular, enhancing mass is noted in the  right breast 10:00 which measures 9 x 6 x 7 mm (image 43 of the axial  T1 postcontrast series). No suspicious mass or non-mass enhancement  seen on the left. There is no lymphadenopathy. Bilateral skin and  nipple areolar complexes are normal. Visualized portions of the chest  and abdomen are unremarkable. Impression  1.

## 2019-07-09 NOTE — PROGRESS NOTES
Spoke with David Edwards in Med Onc Infusion, she did not take her day before dexamethasone prior to treatment today. Dose decreased to 4 mg orally once a day due to flushing and agitation with both IV and oral, so, ok to give IV 10 mg only today and resume oral when returning home today. No need to further medicate with steroid. Verbal order per Dr. Rosalie Stokes. that it is okay to proceed with treatment today.   Electronically signed by Ashli Franz [facility] on 7/9/2019 at 9:24 AM

## 2019-07-15 LAB
ABSOLUTE BASO #: 0 10*3/UL (ref 0–0.1)
ABSOLUTE EOS #: 0 10*3/UL (ref 0–0.4)
ABSOLUTE NEUT #: 3.8 10*3/UL (ref 2.3–7.9)
ALBUMIN: 3.7 GM/DL (ref 3.1–4.5)
ALP BLD-CCNC: 53 U/L (ref 45–117)
ALT SERPL-CCNC: 21 U/L (ref 12–78)
AST SERPL-CCNC: 11 IU/L (ref 3–35)
BASOPHILS %: 0.7 % (ref 0–1)
BILIRUB SERPL-MCNC: 0.2 MG/DL (ref 0.2–1)
BUN BLDV-MCNC: 16 MG/DL (ref 7–24)
CALCIUM SERPL-MCNC: 9 MG/DL (ref 8.5–10.5)
CHLORIDE BLD-SCNC: 109 MMOL/L (ref 98–107)
CO2: 29 MMOL/L (ref 21–32)
CREAT SERPL-MCNC: 0.6 MG/DL (ref 0.55–1.02)
EOSINOPHILS %: 0.5 % (ref 1–4)
GFR AFRICAN AMERICAN: > 60 ML/MIN
GFR SERPL CREATININE-BSD FRML MDRD: >60 ML/MIN/
GLUCOSE: 107 MG/DL (ref 65–99)
HCT VFR BLD CALC: 34.9 % (ref 37–47)
HEMOGLOBIN: 11.1 G/DL (ref 12–16)
IMMATURE GRANULOCYTES #: 0 10*3/UL (ref 0–0.1)
IMMATURE GRANULOCYTES: 0.5 % (ref 0–1)
LYMPHOCYTE %: 25.3 % (ref 27–41)
LYMPHOCYTES # BLD: 1.4 10*3/UL (ref 1.3–4.4)
MCH RBC QN AUTO: 32.7 PG (ref 27–31)
MCHC RBC AUTO-ENTMCNC: 31.8 G/DL (ref 33–37)
MCV RBC AUTO: 102.9 FL (ref 81–99)
MONOCYTES # BLD: 0.3 10*3/UL (ref 0.1–1)
MONOCYTES %: 5.8 % (ref 3–9)
NEUTROPHILS %: 67.2 % (ref 47–73)
NUCLEATED RED BLOOD CELLS: 0 % (ref 0–0)
PDW BLD-RTO: 15.3 % (ref 0–14.5)
PLATELET # BLD: 327 10*3/UL (ref 130–400)
PMV BLD AUTO: 10.1 FL (ref 9.6–12.3)
POTASSIUM SERPL-SCNC: 4.5 MMOL/L (ref 3.5–5.1)
RBC # BLD: 3.39 10*6/UL (ref 4.1–5.1)
SODIUM BLD-SCNC: 141 MMOL/L (ref 136–145)
TOTAL PROTEIN: 7.1 GM/DL (ref 6.4–8.2)
WBC # BLD: 5.7 10*3/UL (ref 4.8–10.8)

## 2019-07-15 RX ORDER — SODIUM CHLORIDE 9 MG/ML
20 INJECTION, SOLUTION INTRAVENOUS ONCE
Status: CANCELLED | OUTPATIENT
Start: 2019-07-16

## 2019-07-15 RX ORDER — DIPHENHYDRAMINE HYDROCHLORIDE 50 MG/ML
50 INJECTION INTRAMUSCULAR; INTRAVENOUS ONCE
Status: CANCELLED | OUTPATIENT
Start: 2019-07-16

## 2019-07-15 RX ORDER — 0.9 % SODIUM CHLORIDE 0.9 %
10 VIAL (ML) INJECTION ONCE
Status: CANCELLED | OUTPATIENT
Start: 2019-07-16

## 2019-07-15 RX ORDER — MEPERIDINE HYDROCHLORIDE 25 MG/ML
12.5 INJECTION INTRAMUSCULAR; INTRAVENOUS; SUBCUTANEOUS ONCE
Status: CANCELLED | OUTPATIENT
Start: 2019-07-16

## 2019-07-15 RX ORDER — SODIUM CHLORIDE 9 MG/ML
INJECTION, SOLUTION INTRAVENOUS CONTINUOUS
Status: CANCELLED | OUTPATIENT
Start: 2019-07-16

## 2019-07-15 RX ORDER — SODIUM CHLORIDE 0.9 % (FLUSH) 0.9 %
5 SYRINGE (ML) INJECTION PRN
Status: CANCELLED | OUTPATIENT
Start: 2019-07-16

## 2019-07-15 RX ORDER — HEPARIN SODIUM (PORCINE) LOCK FLUSH IV SOLN 100 UNIT/ML 100 UNIT/ML
500 SOLUTION INTRAVENOUS PRN
Status: CANCELLED | OUTPATIENT
Start: 2019-07-16

## 2019-07-15 RX ORDER — DEXAMETHASONE SODIUM PHOSPHATE 10 MG/ML
10 INJECTION, SOLUTION INTRAMUSCULAR; INTRAVENOUS ONCE
Status: CANCELLED | OUTPATIENT
Start: 2019-07-16

## 2019-07-15 RX ORDER — EPINEPHRINE 1 MG/ML
0.3 INJECTION, SOLUTION, CONCENTRATE INTRAVENOUS PRN
Status: CANCELLED | OUTPATIENT
Start: 2019-07-16

## 2019-07-15 RX ORDER — SODIUM CHLORIDE 0.9 % (FLUSH) 0.9 %
10 SYRINGE (ML) INJECTION PRN
Status: CANCELLED | OUTPATIENT
Start: 2019-07-16

## 2019-07-15 RX ORDER — METHYLPREDNISOLONE SODIUM SUCCINATE 125 MG/2ML
125 INJECTION, POWDER, LYOPHILIZED, FOR SOLUTION INTRAMUSCULAR; INTRAVENOUS ONCE
Status: CANCELLED | OUTPATIENT
Start: 2019-07-16

## 2019-07-16 ENCOUNTER — OFFICE VISIT (OUTPATIENT)
Dept: ONCOLOGY | Age: 54
End: 2019-07-16
Payer: COMMERCIAL

## 2019-07-16 ENCOUNTER — HOSPITAL ENCOUNTER (OUTPATIENT)
Dept: INFUSION THERAPY | Age: 54
Discharge: HOME | End: 2019-07-16
Payer: COMMERCIAL

## 2019-07-16 VITALS
HEIGHT: 63 IN | SYSTOLIC BLOOD PRESSURE: 125 MMHG | WEIGHT: 128.2 LBS | TEMPERATURE: 97.9 F | HEART RATE: 76 BPM | DIASTOLIC BLOOD PRESSURE: 61 MMHG | BODY MASS INDEX: 22.71 KG/M2 | RESPIRATION RATE: 16 BRPM

## 2019-07-16 VITALS
DIASTOLIC BLOOD PRESSURE: 58 MMHG | SYSTOLIC BLOOD PRESSURE: 109 MMHG | HEART RATE: 75 BPM | RESPIRATION RATE: 18 BRPM | TEMPERATURE: 97.8 F

## 2019-07-16 DIAGNOSIS — C50.911 INFILTRATING DUCTAL CARCINOMA OF RIGHT BREAST, STAGE 1 (HCC): Primary | ICD-10-CM

## 2019-07-16 PROCEDURE — 2580000003 HC RX 258: Performed by: INTERNAL MEDICINE

## 2019-07-16 PROCEDURE — 2500000003 HC RX 250 WO HCPCS: Performed by: INTERNAL MEDICINE

## 2019-07-16 PROCEDURE — 96413 CHEMO IV INFUSION 1 HR: CPT

## 2019-07-16 PROCEDURE — 99214 OFFICE O/P EST MOD 30 MIN: CPT | Performed by: INTERNAL MEDICINE

## 2019-07-16 PROCEDURE — 6360000002 HC RX W HCPCS: Performed by: INTERNAL MEDICINE

## 2019-07-16 PROCEDURE — 96375 TX/PRO/DX INJ NEW DRUG ADDON: CPT

## 2019-07-16 RX ORDER — DEXAMETHASONE SODIUM PHOSPHATE 10 MG/ML
10 INJECTION INTRAMUSCULAR; INTRAVENOUS ONCE
Status: COMPLETED | OUTPATIENT
Start: 2019-07-16 | End: 2019-07-16

## 2019-07-16 RX ORDER — HEPARIN SODIUM (PORCINE) LOCK FLUSH IV SOLN 100 UNIT/ML 100 UNIT/ML
500 SOLUTION INTRAVENOUS PRN
Status: DISCONTINUED | OUTPATIENT
Start: 2019-07-16 | End: 2019-07-17 | Stop reason: HOSPADM

## 2019-07-16 RX ORDER — DIPHENHYDRAMINE HYDROCHLORIDE 50 MG/ML
50 INJECTION INTRAMUSCULAR; INTRAVENOUS ONCE
Status: COMPLETED | OUTPATIENT
Start: 2019-07-16 | End: 2019-07-16

## 2019-07-16 RX ORDER — SODIUM CHLORIDE 9 MG/ML
250 INJECTION, SOLUTION INTRAVENOUS ONCE
Status: DISCONTINUED | OUTPATIENT
Start: 2019-07-16 | End: 2019-07-17 | Stop reason: HOSPADM

## 2019-07-16 RX ORDER — 0.9 % SODIUM CHLORIDE 0.9 %
10 VIAL (ML) INJECTION ONCE
Status: COMPLETED | OUTPATIENT
Start: 2019-07-16 | End: 2019-07-16

## 2019-07-16 RX ORDER — SODIUM CHLORIDE 0.9 % (FLUSH) 0.9 %
10 SYRINGE (ML) INJECTION PRN
Status: DISCONTINUED | OUTPATIENT
Start: 2019-07-16 | End: 2019-07-17 | Stop reason: HOSPADM

## 2019-07-16 RX ADMIN — Medication 10 ML: at 10:51

## 2019-07-16 RX ADMIN — DEXAMETHASONE SODIUM PHOSPHATE 10 MG: 10 INJECTION INTRAMUSCULAR; INTRAVENOUS at 08:59

## 2019-07-16 RX ADMIN — PACLITAXEL 132 MG: 6 INJECTION INTRAVENOUS at 09:40

## 2019-07-16 RX ADMIN — FAMOTIDINE 20 MG: 10 INJECTION, SOLUTION INTRAVENOUS at 08:48

## 2019-07-16 RX ADMIN — Medication 10 ML: at 08:39

## 2019-07-16 RX ADMIN — SODIUM CHLORIDE 250 ML: 9 INJECTION, SOLUTION INTRAVENOUS at 08:47

## 2019-07-16 RX ADMIN — Medication 10 ML: at 08:53

## 2019-07-16 RX ADMIN — Medication 10 ML: at 08:48

## 2019-07-16 RX ADMIN — Medication 10 ML: at 08:58

## 2019-07-16 RX ADMIN — HEPARIN 500 UNITS: 100 SYRINGE at 10:51

## 2019-07-16 RX ADMIN — DIPHENHYDRAMINE HYDROCHLORIDE 50 MG: 50 INJECTION INTRAMUSCULAR; INTRAVENOUS at 08:53

## 2019-07-23 ENCOUNTER — HOSPITAL ENCOUNTER (OUTPATIENT)
Dept: INFUSION THERAPY | Age: 54
Discharge: HOME | End: 2019-07-23
Payer: COMMERCIAL

## 2019-07-23 ENCOUNTER — OFFICE VISIT (OUTPATIENT)
Dept: ONCOLOGY | Age: 54
End: 2019-07-23
Payer: COMMERCIAL

## 2019-07-23 VITALS
SYSTOLIC BLOOD PRESSURE: 115 MMHG | TEMPERATURE: 98.4 F | DIASTOLIC BLOOD PRESSURE: 55 MMHG | HEIGHT: 63 IN | RESPIRATION RATE: 18 BRPM | HEART RATE: 67 BPM | BODY MASS INDEX: 23.83 KG/M2 | WEIGHT: 134.5 LBS

## 2019-07-23 VITALS — HEART RATE: 65 BPM | SYSTOLIC BLOOD PRESSURE: 107 MMHG | RESPIRATION RATE: 16 BRPM | DIASTOLIC BLOOD PRESSURE: 51 MMHG

## 2019-07-23 DIAGNOSIS — D75.89 MACROCYTOSIS: ICD-10-CM

## 2019-07-23 DIAGNOSIS — C50.911 INFILTRATING DUCTAL CARCINOMA OF RIGHT BREAST, STAGE 1 (HCC): Primary | ICD-10-CM

## 2019-07-23 PROCEDURE — 6360000002 HC RX W HCPCS: Performed by: INTERNAL MEDICINE

## 2019-07-23 PROCEDURE — 96413 CHEMO IV INFUSION 1 HR: CPT

## 2019-07-23 PROCEDURE — 96375 TX/PRO/DX INJ NEW DRUG ADDON: CPT

## 2019-07-23 PROCEDURE — 99214 OFFICE O/P EST MOD 30 MIN: CPT | Performed by: INTERNAL MEDICINE

## 2019-07-23 PROCEDURE — 2580000003 HC RX 258: Performed by: INTERNAL MEDICINE

## 2019-07-23 PROCEDURE — 2500000003 HC RX 250 WO HCPCS: Performed by: INTERNAL MEDICINE

## 2019-07-23 RX ORDER — 0.9 % SODIUM CHLORIDE 0.9 %
10 VIAL (ML) INJECTION ONCE
Status: CANCELLED | OUTPATIENT
Start: 2019-07-23

## 2019-07-23 RX ORDER — SODIUM CHLORIDE 9 MG/ML
20 INJECTION, SOLUTION INTRAVENOUS ONCE
Status: DISCONTINUED | OUTPATIENT
Start: 2019-07-23 | End: 2019-07-24 | Stop reason: HOSPADM

## 2019-07-23 RX ORDER — DIPHENHYDRAMINE HYDROCHLORIDE 50 MG/ML
50 INJECTION INTRAMUSCULAR; INTRAVENOUS ONCE
Status: CANCELLED | OUTPATIENT
Start: 2019-07-23

## 2019-07-23 RX ORDER — SODIUM CHLORIDE 0.9 % (FLUSH) 0.9 %
10 SYRINGE (ML) INJECTION PRN
Status: DISCONTINUED | OUTPATIENT
Start: 2019-07-23 | End: 2019-07-24 | Stop reason: HOSPADM

## 2019-07-23 RX ORDER — DEXAMETHASONE SODIUM PHOSPHATE 10 MG/ML
10 INJECTION, SOLUTION INTRAMUSCULAR; INTRAVENOUS ONCE
Status: CANCELLED | OUTPATIENT
Start: 2019-07-23

## 2019-07-23 RX ORDER — SODIUM CHLORIDE 9 MG/ML
20 INJECTION, SOLUTION INTRAVENOUS ONCE
Status: CANCELLED | OUTPATIENT
Start: 2019-07-23

## 2019-07-23 RX ORDER — HEPARIN SODIUM (PORCINE) LOCK FLUSH IV SOLN 100 UNIT/ML 100 UNIT/ML
500 SOLUTION INTRAVENOUS PRN
Status: DISCONTINUED | OUTPATIENT
Start: 2019-07-23 | End: 2019-07-24 | Stop reason: HOSPADM

## 2019-07-23 RX ORDER — METHYLPREDNISOLONE SODIUM SUCCINATE 125 MG/2ML
125 INJECTION, POWDER, LYOPHILIZED, FOR SOLUTION INTRAMUSCULAR; INTRAVENOUS ONCE
Status: CANCELLED | OUTPATIENT
Start: 2019-07-23

## 2019-07-23 RX ORDER — SODIUM CHLORIDE 9 MG/ML
INJECTION, SOLUTION INTRAVENOUS CONTINUOUS
Status: CANCELLED | OUTPATIENT
Start: 2019-07-23

## 2019-07-23 RX ORDER — DEXAMETHASONE SODIUM PHOSPHATE 10 MG/ML
10 INJECTION INTRAMUSCULAR; INTRAVENOUS ONCE
Status: COMPLETED | OUTPATIENT
Start: 2019-07-23 | End: 2019-07-23

## 2019-07-23 RX ORDER — SODIUM CHLORIDE 0.9 % (FLUSH) 0.9 %
10 SYRINGE (ML) INJECTION PRN
Status: CANCELLED | OUTPATIENT
Start: 2019-07-23

## 2019-07-23 RX ORDER — MEPERIDINE HYDROCHLORIDE 25 MG/ML
12.5 INJECTION INTRAMUSCULAR; INTRAVENOUS; SUBCUTANEOUS ONCE
Status: CANCELLED | OUTPATIENT
Start: 2019-07-23

## 2019-07-23 RX ORDER — SODIUM CHLORIDE 0.9 % (FLUSH) 0.9 %
5 SYRINGE (ML) INJECTION PRN
Status: CANCELLED | OUTPATIENT
Start: 2019-07-23

## 2019-07-23 RX ORDER — DIPHENHYDRAMINE HYDROCHLORIDE 50 MG/ML
50 INJECTION INTRAMUSCULAR; INTRAVENOUS ONCE
Status: COMPLETED | OUTPATIENT
Start: 2019-07-23 | End: 2019-07-23

## 2019-07-23 RX ORDER — HEPARIN SODIUM (PORCINE) LOCK FLUSH IV SOLN 100 UNIT/ML 100 UNIT/ML
500 SOLUTION INTRAVENOUS PRN
Status: CANCELLED | OUTPATIENT
Start: 2019-07-23

## 2019-07-23 RX ORDER — EPINEPHRINE 1 MG/ML
0.3 INJECTION, SOLUTION, CONCENTRATE INTRAVENOUS PRN
Status: CANCELLED | OUTPATIENT
Start: 2019-07-23

## 2019-07-23 RX ORDER — 0.9 % SODIUM CHLORIDE 0.9 %
10 VIAL (ML) INJECTION ONCE
Status: COMPLETED | OUTPATIENT
Start: 2019-07-23 | End: 2019-07-23

## 2019-07-23 RX ADMIN — PACLITAXEL 132 MG: 6 INJECTION INTRAVENOUS at 10:08

## 2019-07-23 RX ADMIN — FAMOTIDINE 20 MG: 10 INJECTION INTRAVENOUS at 09:38

## 2019-07-23 RX ADMIN — Medication 10 ML: at 09:12

## 2019-07-23 RX ADMIN — SODIUM CHLORIDE 20 ML/HR: 9 INJECTION, SOLUTION INTRAVENOUS at 09:35

## 2019-07-23 RX ADMIN — HEPARIN 500 UNITS: 100 SYRINGE at 11:16

## 2019-07-23 RX ADMIN — DEXAMETHASONE SODIUM PHOSPHATE 10 MG: 10 INJECTION INTRAMUSCULAR; INTRAVENOUS at 09:49

## 2019-07-23 RX ADMIN — Medication 10 ML: at 10:08

## 2019-07-23 RX ADMIN — Medication 10 ML: at 09:49

## 2019-07-23 RX ADMIN — Medication 10 ML: at 11:16

## 2019-07-23 RX ADMIN — DIPHENHYDRAMINE HYDROCHLORIDE 50 MG: 50 INJECTION INTRAMUSCULAR; INTRAVENOUS at 09:42

## 2019-07-23 RX ADMIN — Medication 10 ML: at 09:38

## 2019-07-23 NOTE — PROGRESS NOTES
years.    Physical Exam:  BP (!) 115/55 (Site: Left Upper Arm, Position: Sitting, Cuff Size: Medium Adult)   Pulse 67   Temp 98.4 °F (36.9 °C) (Temporal)   Resp 18   Ht 5' 3\" (1.6 m)   Wt 134 lb 8 oz (61 kg)   BMI 23.83 kg/m²   GENERAL: Alert, oriented x 3, not in acute distress. HEENT: PERRLA; EOMI. Oropharynx clear. NECK: Supple. No palpable cervical or supraclavicular lymphadenopathy. LUNGS: Good air entry bilaterally. No wheezing, crackles or rhonchi. BREASTS: The left breast exam is negative for any skin changes, no nipple discharge, no palpable masses, no palpable left axillary lymphadenopathy, the right breast exam is negative for an incision from her recent lumpectomy, it is helaing nicely,  no palpable masses, no skin erythema, no palpable right axillary lymphadenopathy. CHEST: right sided port, mild bruising. CARDIOVASCULAR: Regular rate. No murmurs, rubs or gallops. ABDOMEN: Soft. Non-tender, non-distended. Positive bowel sounds. EXTREMITIES: Without clubbing, cyanosis, or edema. NEUROLOGIC: No focal deficits. ECOG PS 0    Impression/Plan:      The patient is a 62-year-old postmenopausal lady with a PMH significant for hyperlipidemia, who had presented with an abnormal screening Mammogram, she was diagnosed with a right breast invasive ductal carcinoma, grade 2, measuring 9 mm, ER positive, 80% ND +5% HER-2/dougie positive, 3+ by IHC. Patient with clinical stage [id number] disease, she underwent on 4/18/2019 a right breast needle localized lumpectomy, with SLN excisional biopsy, tumor size is 2mm, grade 2, with associated DCIS, measuring 7mm, high grade, comedo type with central necrosis, margins are negative, 4 SN were removed, they were all neg for metastatic disease, final pathologic stage pT1a(sn)pN0M0, prognostic stage IA disease. I discussed with the patient and her spouse pathology results, stage and prognosis, adjuvant RT and adjuvant ET with an AI are recommended.  The patient has a small Her2 pos disease (<5mm), I reviewed with her the nccn guidelines and the risks of the  Chemotherapy, she is young and healthy, she is very nervous about the diagnosis of breast cancer, she is very well informed about her disease, she prefers to be treated, JAGDISH SHAFER was originally planned, but given the size of the tumor (<5mm), decided to treat with Taxol (weekly for 12 weeks) with herceptin, the side effects of the treatment including were discussed with the patient, 2D echo has been done LVEF is adequate for treatment with Herceptin. Patient with a personal history of breast cancer, family history is positive for breast and ovarian cancer, testing for HBOC is recommended, MyRisk testing was done, neg for clinically significant mutations. She was started adjuvant chemotherapy with Taxol and Herceptin on 5/28/2019. Labs reviewed. Proceed with chemo today 7/23/2019, Taxol and Herceptin, week #9 (taxol today). Mild macrocytosis, ordered by B-12 and folate, B12 is borderline, ordered MMA, it is not elevated, antiparietal cells and intrinsic factor antibodies done to evaluate for pernicious anemia and are negative, she was started on parenteral vitamin B12 by her PCP. She will be due for a repeat 2 D echo the end of August.    RTC in 1 week with labs, treatment. Thank you for allowing us to participate in the care of Mrs. Quintanilla.     Cruz Bishop MD   HEMATOLOGY/MEDICAL ONCOLOGY  [address] [place] [address] ONCOLOGY  [address] [facility] [address] [facility] [address]  Dept: [phone]

## 2019-07-23 NOTE — PROGRESS NOTES
Laboratory requisition printed and given to patient. Instructed patient to obtain labs one to 2 days prior to next treatment. Verbalized understanding.  Regina Gonzalez RN,OCN

## 2019-07-28 RX ORDER — DEXAMETHASONE SODIUM PHOSPHATE 10 MG/ML
10 INJECTION, SOLUTION INTRAMUSCULAR; INTRAVENOUS ONCE
Status: CANCELLED | OUTPATIENT
Start: 2019-07-30

## 2019-07-28 RX ORDER — SODIUM CHLORIDE 0.9 % (FLUSH) 0.9 %
10 SYRINGE (ML) INJECTION PRN
Status: CANCELLED | OUTPATIENT
Start: 2019-08-20

## 2019-07-28 RX ORDER — SODIUM CHLORIDE 9 MG/ML
INJECTION, SOLUTION INTRAVENOUS CONTINUOUS
Status: CANCELLED | OUTPATIENT
Start: 2019-08-06

## 2019-07-28 RX ORDER — SODIUM CHLORIDE 0.9 % (FLUSH) 0.9 %
5 SYRINGE (ML) INJECTION PRN
Status: CANCELLED | OUTPATIENT
Start: 2019-08-06

## 2019-07-28 RX ORDER — 0.9 % SODIUM CHLORIDE 0.9 %
10 VIAL (ML) INJECTION ONCE
Status: CANCELLED | OUTPATIENT
Start: 2019-08-06

## 2019-07-28 RX ORDER — HEPARIN SODIUM (PORCINE) LOCK FLUSH IV SOLN 100 UNIT/ML 100 UNIT/ML
500 SOLUTION INTRAVENOUS PRN
Status: CANCELLED | OUTPATIENT
Start: 2019-08-06

## 2019-07-28 RX ORDER — DEXAMETHASONE SODIUM PHOSPHATE 10 MG/ML
10 INJECTION, SOLUTION INTRAMUSCULAR; INTRAVENOUS ONCE
Status: CANCELLED | OUTPATIENT
Start: 2019-08-06

## 2019-07-28 RX ORDER — HEPARIN SODIUM (PORCINE) LOCK FLUSH IV SOLN 100 UNIT/ML 100 UNIT/ML
500 SOLUTION INTRAVENOUS PRN
Status: CANCELLED | OUTPATIENT
Start: 2019-08-20

## 2019-07-28 RX ORDER — 0.9 % SODIUM CHLORIDE 0.9 %
10 VIAL (ML) INJECTION ONCE
Status: CANCELLED | OUTPATIENT
Start: 2019-08-20

## 2019-07-28 RX ORDER — SODIUM CHLORIDE 9 MG/ML
20 INJECTION, SOLUTION INTRAVENOUS ONCE
Status: CANCELLED | OUTPATIENT
Start: 2019-08-20

## 2019-07-28 RX ORDER — METHYLPREDNISOLONE SODIUM SUCCINATE 125 MG/2ML
125 INJECTION, POWDER, LYOPHILIZED, FOR SOLUTION INTRAMUSCULAR; INTRAVENOUS ONCE
Status: CANCELLED | OUTPATIENT
Start: 2019-08-06

## 2019-07-28 RX ORDER — 0.9 % SODIUM CHLORIDE 0.9 %
10 VIAL (ML) INJECTION ONCE
Status: CANCELLED | OUTPATIENT
Start: 2019-07-30

## 2019-07-28 RX ORDER — SODIUM CHLORIDE 9 MG/ML
20 INJECTION, SOLUTION INTRAVENOUS ONCE
Status: CANCELLED | OUTPATIENT
Start: 2019-08-06

## 2019-07-28 RX ORDER — DEXAMETHASONE SODIUM PHOSPHATE 10 MG/ML
10 INJECTION, SOLUTION INTRAMUSCULAR; INTRAVENOUS ONCE
Status: CANCELLED | OUTPATIENT
Start: 2019-08-20

## 2019-07-28 RX ORDER — METHYLPREDNISOLONE SODIUM SUCCINATE 125 MG/2ML
125 INJECTION, POWDER, LYOPHILIZED, FOR SOLUTION INTRAMUSCULAR; INTRAVENOUS ONCE
Status: CANCELLED | OUTPATIENT
Start: 2019-08-20

## 2019-07-28 RX ORDER — DIPHENHYDRAMINE HYDROCHLORIDE 50 MG/ML
50 INJECTION INTRAMUSCULAR; INTRAVENOUS ONCE
Status: CANCELLED | OUTPATIENT
Start: 2019-08-06

## 2019-07-28 RX ORDER — SODIUM CHLORIDE 9 MG/ML
20 INJECTION, SOLUTION INTRAVENOUS ONCE
Status: CANCELLED | OUTPATIENT
Start: 2019-07-30

## 2019-07-28 RX ORDER — DIPHENHYDRAMINE HYDROCHLORIDE 50 MG/ML
50 INJECTION INTRAMUSCULAR; INTRAVENOUS ONCE
Status: CANCELLED | OUTPATIENT
Start: 2019-07-30

## 2019-07-28 RX ORDER — DIPHENHYDRAMINE HYDROCHLORIDE 50 MG/ML
50 INJECTION INTRAMUSCULAR; INTRAVENOUS ONCE
Status: CANCELLED | OUTPATIENT
Start: 2019-08-20

## 2019-07-28 RX ORDER — SODIUM CHLORIDE 9 MG/ML
INJECTION, SOLUTION INTRAVENOUS CONTINUOUS
Status: CANCELLED | OUTPATIENT
Start: 2019-08-20

## 2019-07-28 RX ORDER — SODIUM CHLORIDE 0.9 % (FLUSH) 0.9 %
10 SYRINGE (ML) INJECTION PRN
Status: CANCELLED | OUTPATIENT
Start: 2019-08-06

## 2019-07-28 RX ORDER — EPINEPHRINE 1 MG/ML
0.3 INJECTION, SOLUTION, CONCENTRATE INTRAVENOUS PRN
Status: CANCELLED | OUTPATIENT
Start: 2019-08-20

## 2019-07-28 RX ORDER — SODIUM CHLORIDE 9 MG/ML
INJECTION, SOLUTION INTRAVENOUS CONTINUOUS
Status: CANCELLED | OUTPATIENT
Start: 2019-07-30

## 2019-07-28 RX ORDER — METHYLPREDNISOLONE SODIUM SUCCINATE 125 MG/2ML
125 INJECTION, POWDER, LYOPHILIZED, FOR SOLUTION INTRAMUSCULAR; INTRAVENOUS ONCE
Status: CANCELLED | OUTPATIENT
Start: 2019-07-30

## 2019-07-28 RX ORDER — MEPERIDINE HYDROCHLORIDE 25 MG/ML
12.5 INJECTION INTRAMUSCULAR; INTRAVENOUS; SUBCUTANEOUS ONCE
Status: CANCELLED | OUTPATIENT
Start: 2019-08-06

## 2019-07-28 RX ORDER — MEPERIDINE HYDROCHLORIDE 25 MG/ML
12.5 INJECTION INTRAMUSCULAR; INTRAVENOUS; SUBCUTANEOUS ONCE
Status: CANCELLED | OUTPATIENT
Start: 2019-08-20

## 2019-07-28 RX ORDER — SODIUM CHLORIDE 0.9 % (FLUSH) 0.9 %
10 SYRINGE (ML) INJECTION PRN
Status: CANCELLED | OUTPATIENT
Start: 2019-07-30

## 2019-07-28 RX ORDER — EPINEPHRINE 1 MG/ML
0.3 INJECTION, SOLUTION, CONCENTRATE INTRAVENOUS PRN
Status: CANCELLED | OUTPATIENT
Start: 2019-07-30

## 2019-07-28 RX ORDER — HEPARIN SODIUM (PORCINE) LOCK FLUSH IV SOLN 100 UNIT/ML 100 UNIT/ML
500 SOLUTION INTRAVENOUS PRN
Status: CANCELLED | OUTPATIENT
Start: 2019-07-30

## 2019-07-28 RX ORDER — SODIUM CHLORIDE 0.9 % (FLUSH) 0.9 %
5 SYRINGE (ML) INJECTION PRN
Status: CANCELLED | OUTPATIENT
Start: 2019-08-20

## 2019-07-28 RX ORDER — MEPERIDINE HYDROCHLORIDE 25 MG/ML
12.5 INJECTION INTRAMUSCULAR; INTRAVENOUS; SUBCUTANEOUS ONCE
Status: CANCELLED | OUTPATIENT
Start: 2019-07-30

## 2019-07-28 RX ORDER — EPINEPHRINE 1 MG/ML
0.3 INJECTION, SOLUTION, CONCENTRATE INTRAVENOUS PRN
Status: CANCELLED | OUTPATIENT
Start: 2019-08-06

## 2019-07-28 RX ORDER — SODIUM CHLORIDE 0.9 % (FLUSH) 0.9 %
5 SYRINGE (ML) INJECTION PRN
Status: CANCELLED | OUTPATIENT
Start: 2019-07-30

## 2019-07-28 NOTE — PROGRESS NOTES
[address] MED ONCOLOGY  [facility] [address]  Dept: [phone]  Attending Progress Note      Reason for Visit:   Right Breast Cancer. Referring Physician:  Ozie Felty, MD    PCP:  Margaret Knott MD    History of Present Illness: The patient is a 58-year-old lady with a PMH significant for hyperlipidemia, who had presented with an abnormal screening Mammogram, was done on 3/12/2019, revealing calcifications in the right UOQ, she had a right breast US done on 3/13/83621, no masses were seen, she underwent on 3/29/2019 a stereotactic biopsy of the right breast calcifications. Due to biopsy needle malfunction and lack of tissue obtained,  a clip was not placed at the time of biopsy, path:    Breast, right, site not further specified, core needle biopsy:  Infiltrating ductal carcinoma, moderately differentiated. Ductal carcinoma in situ, comedo type, high nuclear grade. Comment:     Intradepartmental consultation is obtained.  Infiltrating  ductal carcinoma corresponds to architectural grade 3, nuclear grade 2,  mitotic grade 1; overall grade 6/9-moderately differentiated; grade 2. Breast Cancer Marker Studies:  Estrogen Receptors (ER): Positive (80% nuclei staining). Staining intensity = strong. Progesterone Receptors (AK): Positive (5% nuclei staining). Staining intensity = weak. Her-2/dougie (c-erb B-2) protein expression:  Positive (3+). She had bilateral Breast MRI done on 4/4/2019 revealing: There is scattered fibroglandular tissue with minimal background  parenchymal enhancement. An irregular, enhancing mass is noted in the  right breast 10:00 which measures 9 x 6 x 7 mm (image 43 of the axial  T1 postcontrast series). No suspicious mass or non-mass enhancement  seen on the left. There is no lymphadenopathy. Bilateral skin and  nipple areolar complexes are normal. Visualized portions of the chest  and abdomen are unremarkable. Impression  1. therapy. Pathologic stage: pT1a-0.2 cm maximum dimension. Regional lymph nodes: (sn) pN0-no regional lymph node metastasis. Distant metastasis: pMX-cannot be assessed. Ancillary studies: Performed on the patient's previous biopsy specimen  (K-; 3/29/2019). Microcalcifications: Present in DCIS. Additional pathologic findings: Fibrocystic change.  Previous  biopsy/excision site. The patient has a small tumor, HER-2/dougie and hormone receptor positive, given her young age and excellent health, she was not comfortable with adjuvant treatment with only hormonal therapy. The patient was started on adjuvant chemotherapy with Taxol and Herceptin on 5/28/2019, she is doing well overall, no persistent tingling or numbness of the hands or the feet, she has mouth sores. Review of Systems;  CONSTITUTIONAL: No fever, chills. Good appetite and energy level. ENMT: Eyes: No diplopia; Nose: No epistaxis. Mouth: No sore throat. RESPIRATORY: No hemoptysis, shortness of breath, cough. CARDIOVASCULAR: No chest pain, palpitations. GASTROINTESTINAL: No nausea/vomiting, abdominal pain, diarrhea/constipation. GENITOURINARY: No dysuria, urinary frequency, hematuria. NEURO: No syncope, presyncope, headache. MSK: she has chronic pain in the back and right hip.   Remainder:  ROS NEGATIVE    Past Medical History:      Diagnosis Date    Cancer [facility])     malignant lesion in Right breast - for OR 4-18-19     Hyperlipidemia     no medications     Post-operative state 5/30/2019     Patient Active Problem List   Diagnosis    Infiltrating ductal carcinoma of right breast, stage 1 ([address]        Past Surgical History:      Procedure Laterality Date    BREAST BIOPSY      BREAST BIOPSY Right 4/18/2019    RIGHT PARTIAL MASTECTOMY WITH NEEDLE LOCALIZATION AND SENTINEL NODE performed by Victor Hugo Perez MD at [facility] 13 / REMOVAL / REPLACEMENT VENOUS ACCESS CATHETER N/A 2019    PORT INSERTION performed by Gosia Crane MD at [facility] OR       Family History:  Family History   Problem Relation Age of Onset    Breast Cancer Mother     Cancer Father         esophageal, stomach    Cancer Paternal Grandfather         colon       Medications:  Reviewed and reconciled. Social History:  Social History     Socioeconomic History    Marital status:      Spouse name: Not on file    Number of children: Not on file    Years of education: Not on file    Highest education level: Not on file   Occupational History    Not on file   Social Needs    Financial resource strain: Not on file    Food insecurity:     Worry: Not on file     Inability: Not on file    Transportation needs:     Medical: Not on file     Non-medical: Not on file   Tobacco Use    Smoking status: Never Smoker    Smokeless tobacco: Never Used   Substance and Sexual Activity    Alcohol use: Not Currently    Drug use: Never    Sexual activity: Yes     Partners: Male   Lifestyle    Physical activity:     Days per week: Not on file     Minutes per session: Not on file    Stress: Not on file   Relationships    Social connections:     Talks on phone: Not on file     Gets together: Not on file     Attends Roman Catholic service: Not on file     Active member of club or organization: Not on file     Attends meetings of clubs or organizations: Not on file     Relationship status: Not on file    Intimate partner violence:     Fear of current or ex partner: Not on file     Emotionally abused: Not on file     Physically abused: Not on file     Forced sexual activity: Not on file   Other Topics Concern    Not on file   Social History Narrative    Not on file       Allergies:  No Known Allergies     OB/GYN:  The patient is , she had her menarche at the age of 9-12, she had a hysterectomy, no oophorectomies, 1st live birth was at 20, she used OCPs for about 10 years.     Physical Exam:  BP (!)

## 2019-07-30 ENCOUNTER — HOSPITAL ENCOUNTER (OUTPATIENT)
Dept: INFUSION THERAPY | Age: 54
Discharge: HOME | End: 2019-07-30
Payer: COMMERCIAL

## 2019-07-30 ENCOUNTER — OFFICE VISIT (OUTPATIENT)
Dept: ONCOLOGY | Age: 54
End: 2019-07-30
Payer: COMMERCIAL

## 2019-07-30 VITALS
SYSTOLIC BLOOD PRESSURE: 104 MMHG | HEART RATE: 70 BPM | RESPIRATION RATE: 16 BRPM | TEMPERATURE: 98.2 F | DIASTOLIC BLOOD PRESSURE: 52 MMHG | WEIGHT: 130.2 LBS | BODY MASS INDEX: 23.07 KG/M2 | HEIGHT: 63 IN

## 2019-07-30 VITALS
SYSTOLIC BLOOD PRESSURE: 98 MMHG | HEART RATE: 65 BPM | TEMPERATURE: 98.8 F | RESPIRATION RATE: 18 BRPM | DIASTOLIC BLOOD PRESSURE: 51 MMHG

## 2019-07-30 DIAGNOSIS — C50.911 INFILTRATING DUCTAL CARCINOMA OF RIGHT BREAST, STAGE 1 (HCC): ICD-10-CM

## 2019-07-30 DIAGNOSIS — C50.911 INFILTRATING DUCTAL CARCINOMA OF RIGHT BREAST, STAGE 1 (HCC): Primary | ICD-10-CM

## 2019-07-30 DIAGNOSIS — T45.1X5A MOUTH SORE SECONDARY TO CHEMOTHERAPY: ICD-10-CM

## 2019-07-30 DIAGNOSIS — D75.89 MACROCYTOSIS: ICD-10-CM

## 2019-07-30 DIAGNOSIS — T45.1X5A CARDIOMYOPATHY DUE TO CHEMOTHERAPY (HCC): ICD-10-CM

## 2019-07-30 DIAGNOSIS — I42.7 CARDIOMYOPATHY DUE TO CHEMOTHERAPY (HCC): ICD-10-CM

## 2019-07-30 DIAGNOSIS — E53.8 VITAMIN B12 DEFICIENCY: Primary | ICD-10-CM

## 2019-07-30 DIAGNOSIS — K13.79 MOUTH SORE SECONDARY TO CHEMOTHERAPY: ICD-10-CM

## 2019-07-30 PROCEDURE — 99214 OFFICE O/P EST MOD 30 MIN: CPT | Performed by: INTERNAL MEDICINE

## 2019-07-30 PROCEDURE — 2580000003 HC RX 258: Performed by: INTERNAL MEDICINE

## 2019-07-30 PROCEDURE — 6360000002 HC RX W HCPCS: Performed by: INTERNAL MEDICINE

## 2019-07-30 PROCEDURE — 96417 CHEMO IV INFUS EACH ADDL SEQ: CPT

## 2019-07-30 PROCEDURE — 96375 TX/PRO/DX INJ NEW DRUG ADDON: CPT

## 2019-07-30 PROCEDURE — 2500000003 HC RX 250 WO HCPCS: Performed by: INTERNAL MEDICINE

## 2019-07-30 PROCEDURE — 96413 CHEMO IV INFUSION 1 HR: CPT

## 2019-07-30 RX ORDER — 0.9 % SODIUM CHLORIDE 0.9 %
10 VIAL (ML) INJECTION ONCE
Status: COMPLETED | OUTPATIENT
Start: 2019-07-30 | End: 2019-07-30

## 2019-07-30 RX ORDER — HEPARIN SODIUM (PORCINE) LOCK FLUSH IV SOLN 100 UNIT/ML 100 UNIT/ML
500 SOLUTION INTRAVENOUS PRN
Status: DISCONTINUED | OUTPATIENT
Start: 2019-07-30 | End: 2019-07-31 | Stop reason: HOSPADM

## 2019-07-30 RX ORDER — DIPHENHYDRAMINE HYDROCHLORIDE 50 MG/ML
50 INJECTION INTRAMUSCULAR; INTRAVENOUS ONCE
Status: COMPLETED | OUTPATIENT
Start: 2019-07-30 | End: 2019-07-30

## 2019-07-30 RX ORDER — SODIUM CHLORIDE 0.9 % (FLUSH) 0.9 %
10 SYRINGE (ML) INJECTION PRN
Status: DISCONTINUED | OUTPATIENT
Start: 2019-07-30 | End: 2019-07-31 | Stop reason: HOSPADM

## 2019-07-30 RX ORDER — SODIUM CHLORIDE 9 MG/ML
20 INJECTION, SOLUTION INTRAVENOUS ONCE
Status: DISCONTINUED | OUTPATIENT
Start: 2019-07-30 | End: 2019-07-31 | Stop reason: HOSPADM

## 2019-07-30 RX ORDER — DEXAMETHASONE SODIUM PHOSPHATE 10 MG/ML
10 INJECTION INTRAMUSCULAR; INTRAVENOUS ONCE
Status: COMPLETED | OUTPATIENT
Start: 2019-07-30 | End: 2019-07-30

## 2019-07-30 RX ADMIN — FAMOTIDINE 20 MG: 10 INJECTION INTRAVENOUS at 08:50

## 2019-07-30 RX ADMIN — Medication 10 ML: at 08:55

## 2019-07-30 RX ADMIN — Medication 10 ML: at 09:13

## 2019-07-30 RX ADMIN — TRASTUZUMAB 357 MG: 150 INJECTION, POWDER, LYOPHILIZED, FOR SOLUTION INTRAVENOUS at 09:13

## 2019-07-30 RX ADMIN — Medication 10 ML: at 09:58

## 2019-07-30 RX ADMIN — Medication 10 ML: at 08:38

## 2019-07-30 RX ADMIN — DIPHENHYDRAMINE HYDROCHLORIDE 50 MG: 50 INJECTION, SOLUTION INTRAMUSCULAR; INTRAVENOUS at 08:55

## 2019-07-30 RX ADMIN — Medication 10 ML: at 08:50

## 2019-07-30 RX ADMIN — Medication 10 ML: at 09:03

## 2019-07-30 RX ADMIN — DEXAMETHASONE SODIUM PHOSPHATE 10 MG: 10 INJECTION INTRAMUSCULAR; INTRAVENOUS at 09:03

## 2019-07-30 RX ADMIN — SODIUM CHLORIDE 20 ML/HR: 9 INJECTION, SOLUTION INTRAVENOUS at 08:49

## 2019-07-30 RX ADMIN — PACLITAXEL 132 MG: 6 INJECTION INTRAVENOUS at 09:58

## 2019-07-30 RX ADMIN — SODIUM CHLORIDE, PRESERVATIVE FREE 500 UNITS: 5 INJECTION INTRAVENOUS at 11:12

## 2019-07-30 RX ADMIN — Medication 10 ML: at 11:12

## 2019-08-06 ENCOUNTER — HOSPITAL ENCOUNTER (OUTPATIENT)
Dept: INFUSION THERAPY | Age: 54
Discharge: HOME | End: 2019-08-06
Payer: COMMERCIAL

## 2019-08-06 VITALS
HEIGHT: 63 IN | HEART RATE: 75 BPM | SYSTOLIC BLOOD PRESSURE: 107 MMHG | BODY MASS INDEX: 23.21 KG/M2 | DIASTOLIC BLOOD PRESSURE: 53 MMHG | RESPIRATION RATE: 18 BRPM | TEMPERATURE: 97.2 F | WEIGHT: 131 LBS

## 2019-08-06 DIAGNOSIS — C50.911 INFILTRATING DUCTAL CARCINOMA OF RIGHT BREAST, STAGE 1 (HCC): Primary | ICD-10-CM

## 2019-08-06 PROCEDURE — 96375 TX/PRO/DX INJ NEW DRUG ADDON: CPT

## 2019-08-06 PROCEDURE — 96413 CHEMO IV INFUSION 1 HR: CPT

## 2019-08-06 PROCEDURE — 6360000002 HC RX W HCPCS: Performed by: INTERNAL MEDICINE

## 2019-08-06 PROCEDURE — 2580000003 HC RX 258: Performed by: INTERNAL MEDICINE

## 2019-08-06 PROCEDURE — 2500000003 HC RX 250 WO HCPCS

## 2019-08-06 PROCEDURE — 96409 CHEMO IV PUSH SNGL DRUG: CPT

## 2019-08-06 PROCEDURE — 6360000002 HC RX W HCPCS

## 2019-08-06 RX ORDER — DEXAMETHASONE SODIUM PHOSPHATE 10 MG/ML
10 INJECTION INTRAMUSCULAR; INTRAVENOUS ONCE
Status: COMPLETED | OUTPATIENT
Start: 2019-08-06 | End: 2019-08-06

## 2019-08-06 RX ORDER — DEXAMETHASONE SODIUM PHOSPHATE 10 MG/ML
INJECTION INTRAMUSCULAR; INTRAVENOUS
Status: COMPLETED
Start: 2019-08-06 | End: 2019-08-06

## 2019-08-06 RX ORDER — SODIUM CHLORIDE 9 MG/ML
20 INJECTION, SOLUTION INTRAVENOUS ONCE
Status: DISCONTINUED | OUTPATIENT
Start: 2019-08-06 | End: 2019-08-07 | Stop reason: HOSPADM

## 2019-08-06 RX ORDER — DIPHENHYDRAMINE HYDROCHLORIDE 50 MG/ML
50 INJECTION INTRAMUSCULAR; INTRAVENOUS ONCE
Status: COMPLETED | OUTPATIENT
Start: 2019-08-06 | End: 2019-08-06

## 2019-08-06 RX ORDER — HEPARIN SODIUM (PORCINE) LOCK FLUSH IV SOLN 100 UNIT/ML 100 UNIT/ML
500 SOLUTION INTRAVENOUS PRN
Status: DISCONTINUED | OUTPATIENT
Start: 2019-08-06 | End: 2019-08-07 | Stop reason: HOSPADM

## 2019-08-06 RX ORDER — DIPHENHYDRAMINE HYDROCHLORIDE 50 MG/ML
INJECTION INTRAMUSCULAR; INTRAVENOUS
Status: COMPLETED
Start: 2019-08-06 | End: 2019-08-06

## 2019-08-06 RX ORDER — 0.9 % SODIUM CHLORIDE 0.9 %
10 VIAL (ML) INJECTION ONCE
Status: COMPLETED | OUTPATIENT
Start: 2019-08-06 | End: 2019-08-06

## 2019-08-06 RX ORDER — SODIUM CHLORIDE 0.9 % (FLUSH) 0.9 %
10 SYRINGE (ML) INJECTION PRN
Status: DISCONTINUED | OUTPATIENT
Start: 2019-08-06 | End: 2019-08-07 | Stop reason: HOSPADM

## 2019-08-06 RX ADMIN — Medication 10 ML: at 09:18

## 2019-08-06 RX ADMIN — Medication 10 ML: at 09:13

## 2019-08-06 RX ADMIN — DIPHENHYDRAMINE HYDROCHLORIDE 50 MG: 50 INJECTION, SOLUTION INTRAMUSCULAR; INTRAVENOUS at 09:25

## 2019-08-06 RX ADMIN — FAMOTIDINE 20 MG: 10 INJECTION INTRAVENOUS at 09:12

## 2019-08-06 RX ADMIN — Medication 10 ML: at 09:12

## 2019-08-06 RX ADMIN — DIPHENHYDRAMINE HYDROCHLORIDE 50 MG: 50 INJECTION INTRAMUSCULAR; INTRAVENOUS at 09:25

## 2019-08-06 RX ADMIN — DEXAMETHASONE SODIUM PHOSPHATE 10 MG: 10 INJECTION INTRAMUSCULAR; INTRAVENOUS at 09:17

## 2019-08-06 RX ADMIN — PACLITAXEL 132 MG: 6 INJECTION INTRAVENOUS at 09:46

## 2019-08-06 RX ADMIN — Medication 10 ML: at 09:26

## 2019-08-06 RX ADMIN — SODIUM CHLORIDE 20 ML/HR: 9 INJECTION, SOLUTION INTRAVENOUS at 09:12

## 2019-08-06 RX ADMIN — Medication 10 ML: at 10:55

## 2019-08-06 RX ADMIN — HEPARIN 500 UNITS: 100 SYRINGE at 10:55

## 2019-08-16 ENCOUNTER — TELEPHONE (OUTPATIENT)
Dept: ONCOLOGY | Age: 54
End: 2019-08-16

## 2019-08-16 NOTE — TELEPHONE ENCOUNTER
Patient has Echo due @ end of August 2019. Yaz Almeida called patient's insurance, [employer], today, 08/16/19, to get authorization. [employer] stated they are faxing over a REQUEST FORM that needs to be filled out and faxed back before they can authorize an approval for Echo. Ester Almeida filling out REQUEST FORM to start prior authorization process.

## 2019-08-19 ENCOUNTER — HOSPITAL ENCOUNTER (OUTPATIENT)
Dept: RADIATION ONCOLOGY | Age: 54
Discharge: HOME | End: 2019-08-19
Payer: COMMERCIAL

## 2019-08-19 ENCOUNTER — TELEPHONE (OUTPATIENT)
Dept: CASE MANAGEMENT | Age: 54
End: 2019-08-19

## 2019-08-19 VITALS
WEIGHT: 130 LBS | BODY MASS INDEX: 23.03 KG/M2 | TEMPERATURE: 97 F | HEART RATE: 66 BPM | DIASTOLIC BLOOD PRESSURE: 80 MMHG | RESPIRATION RATE: 16 BRPM | SYSTOLIC BLOOD PRESSURE: 108 MMHG

## 2019-08-19 DIAGNOSIS — C50.911 INFILTRATING DUCTAL CARCINOMA OF RIGHT BREAST, STAGE 1 (HCC): Primary | ICD-10-CM

## 2019-08-19 PROCEDURE — 99205 OFFICE O/P NEW HI 60 MIN: CPT

## 2019-08-19 PROCEDURE — 99204 OFFICE O/P NEW MOD 45 MIN: CPT | Performed by: RADIOLOGY

## 2019-08-19 NOTE — TELEPHONE ENCOUNTER
Met with patient after her initial consult appointment with Dr. Angelika Singh for her recent diagnosis of triple positive right breast invasive ductal carcinoma. I explained my role as Nurse Navigator and provided my contact information. She has been working with Marquise Razo RN NN at Dr. Sharri Johnson office. Patient had a right sided lumpectomy SLND (0/4 ln+) on 4/18/19. She then started adjuvant treatment with weekly x 12 Taxol and every 21 day Herceptin on 5/28/19. Her last Taxol is scheduled for 8/20/19. She is scheduled for CT-simulation on 9/12/19. Patient will receive 16 radiation treatments per Dr. Angelika Singh. She is planning on a vacation in October and is happy that she will be done with radiation by then. Patient denies any transportation, dietary, emotional, or financial issues. I provided literature consisting of Women[facility] Cancer (Patient Resource), our listing of Local Resources for the Cancer Survivor, and Marianela Lorenzo. I encouraged her to stop by my office or contact me with any future questions and/or concerns. Understanding was verbalized of all.

## 2019-08-19 NOTE — PROGRESS NOTES
08/19/19 1050   BP: 108/80   Pulse: 66   Resp: 16   Temp: 97 °F (36.1 °C)   Weight: 130 lb (59 kg)      ECOG Performance Status: 0    Constitutional: Healthy    ENT: Throat is clear    Lymphatic: No palpable neck or axillary lymphadenopathy    Breast exam: Right breast: Lumpectomy site well-healed mild fibrosis. No tenderness no suspicious nodule. Fibroglandular texture. Left breast: Negative    Respiratory: Lungs clear bilaterally    Cardiovascular:.  S1-S2 RRR    Abdomen: Benign    Musculoskeletal:Extremities: . No pedal edema    CNS: Grossly nonfocal    Radiation safety and oncologic treatment support:    - Previous radiation history:  No  - History of autoimmune or connective tissue disease:  No  - Nutritional support/ PEG:  Not applicable  - Dental evaluation:  Not applicable  -  requested:  Not asked for.  - Oncology Nurse navigator requested:  - Transportation for daily treatment:  Self    Other Investigations/Laboratory Data:    Lab Results   Component Value Date    WBC 5.7 07/15/2019    HGB 11.1 (L) 07/15/2019    HCT 34.9 (L) 07/15/2019    .9 (H) 07/15/2019     07/15/2019       Lab Results   Component Value Date     07/15/2019    K 4.5 07/15/2019    K 4.2 05/17/2019     07/15/2019    CO2 29 07/15/2019    BUN 16 07/15/2019    CREATININE 0.60 07/15/2019    GLUCOSE 107 07/15/2019    CALCIUM 9.0 07/15/2019       Tumor markers: No results found for: PSA, CEA, , GT0247,     Impression: Stage I triple positive right breast cancer, status post conservative surgery and ongoing chemo with Taxol plus Herceptin    Discussion/Plan:   Following completion of chemo, adjuvant hypofractionated XRT to the right breast is indicated for local control. I discussed the rationale, statistical results, radiation program of treatment and SEs with her in good detail. All questions answered.   Consent obtained          NCCN guidelines, version 2016 is used to help review treatment recommendations. Procedures and process involved with CT simulation and daily radiation treatments were explained. Toxicities, both expected and less common, were reviewed in detail. Questions were answered to their apparent satisfaction. Thank you,  for allowing us to participate in the care of your patient.      Ne Gonzalez MD  [address] [place] [address]:  [phone]              FAX: [phone]    [facility]:      [phone]             FAX:  [phone]      CC:  Cindy Julian MD , Zia Shin MD  [address] [place] [address], [address] [place] [address]

## 2019-08-19 NOTE — PROGRESS NOTES
trying? 0- No     2. Have you been eating poorly because of a decreased appetite? 0- No   3. Do you have a diagnosis of head and neck cancer? 0- No                                                                                    TOTAL 0          Score of 0-1: No action  Score 2 or greater:  · For Non-Diabetic Patient: Recommend adding Ensure Complete 2 x daily and provide patient with Ensure wellness bag with coupons  · For Diabetic Patient: Recommend adding Glucerna Shake 2 x daily and provide patient with Glucerna Wellness bag with coupons  · Route to the dietitian via [employer]    · Are you having  difficulty performing daily routine tasks  due to fatigue or weakness (ie: bathing/showering, dressing, housework, meal prep, work, child [employer]): No     · Do you have any arm flexibility/ROM restrictions, swelling or pain that limit activity: No     · Any changes in memory, attention/focus that impact daily activities: No     · Do you avoid participation in leisure/social activity due weakness, fatigue or pain: No     ARE ANY OF THE ABOVE ARE ANSWERED YES: No          PT ASSESSMENT FOR REFERRAL    · Have you had any recent falls in past 2 months: No     · Do you have difficulty  going up/down stairs: No     · Are you having difficulty walking: No     · Do you often hold onto furniture/environmental supports or feel off balance when you are walking: No     · Do you need to take rest breaks when you are walking: No     · Any pain on scale of 1-10 that limits your mobility: No 0/10    ARE ANY OF THE ABOVE ARE ANSWERED YES: No           LYMPHEDEMA SCREENING ASSESSMENT FOR PATIENTS WITH BREAST CANCER    The patient reports the following signs/symptoms of lymphedema: None    Please ask the provider to assess patient for lymphedema for any reported signs or symptoms so a referral to Lymphedema Therapy can be considered.           PREHAB AUDIOLOGY REFERRAL    - Is patient

## 2019-08-20 ENCOUNTER — TELEPHONE (OUTPATIENT)
Dept: CASE MANAGEMENT | Age: 54
End: 2019-08-20

## 2019-08-20 ENCOUNTER — OFFICE VISIT (OUTPATIENT)
Dept: ONCOLOGY | Age: 54
End: 2019-08-20
Payer: COMMERCIAL

## 2019-08-20 ENCOUNTER — HOSPITAL ENCOUNTER (OUTPATIENT)
Dept: INFUSION THERAPY | Age: 54
Discharge: HOME | End: 2019-08-20
Payer: COMMERCIAL

## 2019-08-20 VITALS
HEIGHT: 63 IN | OXYGEN SATURATION: 99 % | WEIGHT: 131 LBS | BODY MASS INDEX: 23.21 KG/M2 | SYSTOLIC BLOOD PRESSURE: 101 MMHG | DIASTOLIC BLOOD PRESSURE: 59 MMHG | TEMPERATURE: 97.4 F | HEART RATE: 77 BPM

## 2019-08-20 VITALS
RESPIRATION RATE: 18 BRPM | SYSTOLIC BLOOD PRESSURE: 103 MMHG | TEMPERATURE: 98.9 F | HEART RATE: 67 BPM | DIASTOLIC BLOOD PRESSURE: 53 MMHG

## 2019-08-20 DIAGNOSIS — C50.911 INFILTRATING DUCTAL CARCINOMA OF RIGHT BREAST, STAGE 1 (HCC): Primary | ICD-10-CM

## 2019-08-20 PROCEDURE — 99214 OFFICE O/P EST MOD 30 MIN: CPT | Performed by: INTERNAL MEDICINE

## 2019-08-20 PROCEDURE — 2580000003 HC RX 258

## 2019-08-20 PROCEDURE — 2580000003 HC RX 258: Performed by: INTERNAL MEDICINE

## 2019-08-20 PROCEDURE — 6360000002 HC RX W HCPCS: Performed by: INTERNAL MEDICINE

## 2019-08-20 PROCEDURE — 96417 CHEMO IV INFUS EACH ADDL SEQ: CPT

## 2019-08-20 PROCEDURE — 96375 TX/PRO/DX INJ NEW DRUG ADDON: CPT

## 2019-08-20 PROCEDURE — 96413 CHEMO IV INFUSION 1 HR: CPT

## 2019-08-20 PROCEDURE — 2500000003 HC RX 250 WO HCPCS: Performed by: INTERNAL MEDICINE

## 2019-08-20 RX ORDER — DIPHENHYDRAMINE HYDROCHLORIDE 50 MG/ML
50 INJECTION INTRAMUSCULAR; INTRAVENOUS ONCE
Status: CANCELLED | OUTPATIENT
Start: 2019-08-20

## 2019-08-20 RX ORDER — SODIUM CHLORIDE 9 MG/ML
20 INJECTION, SOLUTION INTRAVENOUS ONCE
Status: DISCONTINUED | OUTPATIENT
Start: 2019-08-20 | End: 2019-08-21 | Stop reason: HOSPADM

## 2019-08-20 RX ORDER — MEPERIDINE HYDROCHLORIDE 25 MG/ML
12.5 INJECTION INTRAMUSCULAR; INTRAVENOUS; SUBCUTANEOUS ONCE
Status: CANCELLED | OUTPATIENT
Start: 2019-08-20

## 2019-08-20 RX ORDER — SODIUM CHLORIDE 0.9 % (FLUSH) 0.9 %
10 SYRINGE (ML) INJECTION PRN
Status: CANCELLED | OUTPATIENT
Start: 2019-08-20

## 2019-08-20 RX ORDER — HEPARIN SODIUM (PORCINE) LOCK FLUSH IV SOLN 100 UNIT/ML 100 UNIT/ML
500 SOLUTION INTRAVENOUS PRN
Status: DISCONTINUED | OUTPATIENT
Start: 2019-08-20 | End: 2019-08-21 | Stop reason: HOSPADM

## 2019-08-20 RX ORDER — EPINEPHRINE 1 MG/ML
0.3 INJECTION, SOLUTION, CONCENTRATE INTRAVENOUS PRN
Status: CANCELLED | OUTPATIENT
Start: 2019-08-20

## 2019-08-20 RX ORDER — SODIUM CHLORIDE 9 MG/ML
INJECTION, SOLUTION INTRAVENOUS CONTINUOUS
Status: CANCELLED | OUTPATIENT
Start: 2019-08-20

## 2019-08-20 RX ORDER — METHYLPREDNISOLONE SODIUM SUCCINATE 125 MG/2ML
125 INJECTION, POWDER, LYOPHILIZED, FOR SOLUTION INTRAMUSCULAR; INTRAVENOUS ONCE
Status: CANCELLED | OUTPATIENT
Start: 2019-08-20

## 2019-08-20 RX ORDER — 0.9 % SODIUM CHLORIDE 0.9 %
10 VIAL (ML) INJECTION ONCE
Status: CANCELLED | OUTPATIENT
Start: 2019-08-20

## 2019-08-20 RX ORDER — DIPHENHYDRAMINE HYDROCHLORIDE 50 MG/ML
50 INJECTION INTRAMUSCULAR; INTRAVENOUS ONCE
Status: COMPLETED | OUTPATIENT
Start: 2019-08-20 | End: 2019-08-20

## 2019-08-20 RX ORDER — HEPARIN SODIUM (PORCINE) LOCK FLUSH IV SOLN 100 UNIT/ML 100 UNIT/ML
500 SOLUTION INTRAVENOUS PRN
Status: CANCELLED | OUTPATIENT
Start: 2019-08-20

## 2019-08-20 RX ORDER — SODIUM CHLORIDE 0.9 % (FLUSH) 0.9 %
10 SYRINGE (ML) INJECTION PRN
Status: DISCONTINUED | OUTPATIENT
Start: 2019-08-20 | End: 2019-08-21 | Stop reason: HOSPADM

## 2019-08-20 RX ORDER — DEXAMETHASONE SODIUM PHOSPHATE 10 MG/ML
10 INJECTION INTRAMUSCULAR; INTRAVENOUS ONCE
Status: COMPLETED | OUTPATIENT
Start: 2019-08-20 | End: 2019-08-20

## 2019-08-20 RX ORDER — SODIUM CHLORIDE 9 MG/ML
20 INJECTION, SOLUTION INTRAVENOUS ONCE
Status: CANCELLED | OUTPATIENT
Start: 2019-08-20

## 2019-08-20 RX ORDER — 0.9 % SODIUM CHLORIDE 0.9 %
10 VIAL (ML) INJECTION ONCE
Status: COMPLETED | OUTPATIENT
Start: 2019-08-20 | End: 2019-08-20

## 2019-08-20 RX ORDER — SODIUM CHLORIDE 9 MG/ML
INJECTION, SOLUTION INTRAVENOUS
Status: DISCONTINUED
Start: 2019-08-20 | End: 2019-08-21 | Stop reason: HOSPADM

## 2019-08-20 RX ORDER — SODIUM CHLORIDE 0.9 % (FLUSH) 0.9 %
5 SYRINGE (ML) INJECTION PRN
Status: CANCELLED | OUTPATIENT
Start: 2019-08-20

## 2019-08-20 RX ADMIN — FAMOTIDINE 20 MG: 10 INJECTION INTRAVENOUS at 09:23

## 2019-08-20 RX ADMIN — Medication 10 ML: at 10:09

## 2019-08-20 RX ADMIN — SODIUM CHLORIDE 250 ML: 9 INJECTION, SOLUTION INTRAVENOUS at 09:11

## 2019-08-20 RX ADMIN — Medication 10 ML: at 09:17

## 2019-08-20 RX ADMIN — PACLITAXEL 132 MG: 6 INJECTION INTRAVENOUS at 10:10

## 2019-08-20 RX ADMIN — Medication 10 ML: at 09:12

## 2019-08-20 RX ADMIN — Medication 10 ML: at 09:23

## 2019-08-20 RX ADMIN — DEXAMETHASONE SODIUM PHOSPHATE 10 MG: 10 INJECTION INTRAMUSCULAR; INTRAVENOUS at 09:12

## 2019-08-20 RX ADMIN — TRASTUZUMAB 357 MG: 150 INJECTION, POWDER, LYOPHILIZED, FOR SOLUTION INTRAVENOUS at 09:29

## 2019-08-20 RX ADMIN — Medication 10 ML: at 08:51

## 2019-08-20 RX ADMIN — DIPHENHYDRAMINE HYDROCHLORIDE 50 MG: 50 INJECTION, SOLUTION INTRAMUSCULAR; INTRAVENOUS at 09:17

## 2019-08-20 RX ADMIN — Medication 10 ML: at 11:25

## 2019-08-20 RX ADMIN — HEPARIN 500 UNITS: 100 SYRINGE at 11:25

## 2019-08-20 NOTE — PROGRESS NOTES
[address] MED ONCOLOGY  [address] [place] [id number]  Dept: [phone]  Attending Progress Note      Reason for Visit:   Right Breast Cancer. Referring Physician:  Daniel Pizano MD    PCP:  Daphne Rodríguez MD    History of Present Illness: The patient is a 65-year-old lady with a PMH significant for hyperlipidemia, who had presented with an abnormal screening Mammogram, was done on 3/12/2019, revealing calcifications in the right UOQ, she had a right breast US done on 3/13/84979, no masses were seen, she underwent on 3/29/2019 a stereotactic biopsy of the right breast calcifications. Due to biopsy needle malfunction and lack of tissue obtained,  a clip was not placed at the time of biopsy, path:    Breast, right, site not further specified, core needle biopsy:  Infiltrating ductal carcinoma, moderately differentiated. Ductal carcinoma in situ, comedo type, high nuclear grade. Comment:     Intradepartmental consultation is obtained.  Infiltrating  ductal carcinoma corresponds to architectural grade 3, nuclear grade 2,  mitotic grade 1; overall grade 6/9-moderately differentiated; grade 2. Breast Cancer Marker Studies:  Estrogen Receptors (ER): Positive (80% nuclei staining). Staining intensity = strong. Progesterone Receptors (UT): Positive (5% nuclei staining). Staining intensity = weak. Her-2/dougie (c-erb B-2) protein expression:  Positive (3+). She had bilateral Breast MRI done on 4/4/2019 revealing: There is scattered fibroglandular tissue with minimal background  parenchymal enhancement. An irregular, enhancing mass is noted in the  right breast 10:00 which measures 9 x 6 x 7 mm (image 43 of the axial  T1 postcontrast series). No suspicious mass or non-mass enhancement  seen on the left. There is no lymphadenopathy. Bilateral skin and  nipple areolar complexes are normal. Visualized portions of the chest  and abdomen are unremarkable. Impression  1. therapy. Pathologic stage: pT1a-0.2 cm maximum dimension. Regional lymph nodes: (sn) pN0-no regional lymph node metastasis. Distant metastasis: pMX-cannot be assessed. Ancillary studies: Performed on the patient's previous biopsy specimen  ([facility]-; 3/29/2019). Microcalcifications: Present in DCIS. Additional pathologic findings: Fibrocystic change.  Previous  biopsy/excision site. The patient has a small tumor, HER-2/dougie and hormone receptor positive, given her young age and excellent health, she was not comfortable with adjuvant treatment with only hormonal therapy. The patient was started on adjuvant chemotherapy with Taxol and Herceptin on 5/28/2019, she is doing well overall, no persistent tingling or numbness of the hands or the feet, she met with Dr. Joaquin Christian who discussed with her adjuvant RT. Review of Systems;  CONSTITUTIONAL: No fever, chills. Good appetite and energy level. ENMT: Eyes: No diplopia; Nose: No epistaxis. Mouth: No sore throat. RESPIRATORY: No hemoptysis, shortness of breath, cough. CARDIOVASCULAR: No chest pain, palpitations. GASTROINTESTINAL: No nausea/vomiting, abdominal pain, diarrhea/constipation. GENITOURINARY: No dysuria, urinary frequency, hematuria. NEURO: No syncope, presyncope, headache. MSK: she has chronic pain in the back and right hip.   Remainder:  ROS NEGATIVE    Past Medical History:      Diagnosis Date    Cancer [facility])     malignant lesion in Right breast - for OR 4-18-19     Hyperlipidemia     no medications     Post-operative state 5/30/2019     Patient Active Problem List   Diagnosis    Infiltrating ductal carcinoma of right breast, stage 1 ([address]        Past Surgical History:      Procedure Laterality Date    BREAST BIOPSY      BREAST BIOPSY Right 4/18/2019    RIGHT PARTIAL MASTECTOMY WITH NEEDLE LOCALIZATION AND SENTINEL NODE performed by Radha Kessler MD at [facility] 13 / REMOVAL / REPLACEMENT VENOUS ACCESS CATHETER N/A 2019    PORT INSERTION performed by Peyton Gil MD at [facility] OR       Family History:  Family History   Problem Relation Age of Onset    Breast Cancer Mother     Cancer Father         esophageal, stomach    Cancer Paternal Grandfather         colon       Medications:  Reviewed and reconciled.     Social History:  Social History     Socioeconomic History    Marital status:      Spouse name: Not on file    Number of children: Not on file    Years of education: Not on file    Highest education level: Not on file   Occupational History    Not on file   Social Needs    Financial resource strain: Not on file    Food insecurity:     Worry: Not on file     Inability: Not on file    Transportation needs:     Medical: Not on file     Non-medical: Not on file   Tobacco Use    Smoking status: Never Smoker    Smokeless tobacco: Never Used   Substance and Sexual Activity    Alcohol use: Not Currently    Drug use: Never    Sexual activity: Yes     Partners: Male   Lifestyle    Physical activity:     Days per week: Not on file     Minutes per session: Not on file    Stress: Not on file   Relationships    Social connections:     Talks on phone: Not on file     Gets together: Not on file     Attends Anglican service: Not on file     Active member of club or organization: Not on file     Attends meetings of clubs or organizations: Not on file     Relationship status: Not on file    Intimate partner violence:     Fear of current or ex partner: Not on file     Emotionally abused: Not on file     Physically abused: Not on file     Forced sexual activity: Not on file   Other Topics Concern    Not on file   Social History Narrative    Not on file       Allergies:  No Known Allergies     OB/GYN:  The patient is , she had her menarche at the age of 9-12, she had a hysterectomy, no oophorectomies, 1st live birth was at 20, she used OCPs for about

## 2019-08-22 ENCOUNTER — TELEPHONE (OUTPATIENT)
Dept: CASE MANAGEMENT | Age: 54
End: 2019-08-22

## 2019-09-09 RX ORDER — 0.9 % SODIUM CHLORIDE 0.9 %
10 VIAL (ML) INJECTION ONCE
Status: CANCELLED | OUTPATIENT
Start: 2019-09-10

## 2019-09-09 RX ORDER — METHYLPREDNISOLONE SODIUM SUCCINATE 125 MG/2ML
125 INJECTION, POWDER, LYOPHILIZED, FOR SOLUTION INTRAMUSCULAR; INTRAVENOUS ONCE
Status: CANCELLED | OUTPATIENT
Start: 2019-09-10

## 2019-09-09 RX ORDER — SODIUM CHLORIDE 0.9 % (FLUSH) 0.9 %
10 SYRINGE (ML) INJECTION PRN
Status: CANCELLED | OUTPATIENT
Start: 2019-09-10

## 2019-09-09 RX ORDER — DIPHENHYDRAMINE HYDROCHLORIDE 50 MG/ML
50 INJECTION INTRAMUSCULAR; INTRAVENOUS ONCE
Status: CANCELLED | OUTPATIENT
Start: 2019-09-10

## 2019-09-09 RX ORDER — SODIUM CHLORIDE 9 MG/ML
20 INJECTION, SOLUTION INTRAVENOUS ONCE
Status: CANCELLED | OUTPATIENT
Start: 2019-09-10

## 2019-09-09 RX ORDER — SODIUM CHLORIDE 9 MG/ML
INJECTION, SOLUTION INTRAVENOUS CONTINUOUS
Status: CANCELLED | OUTPATIENT
Start: 2019-09-10

## 2019-09-09 RX ORDER — HEPARIN SODIUM (PORCINE) LOCK FLUSH IV SOLN 100 UNIT/ML 100 UNIT/ML
500 SOLUTION INTRAVENOUS PRN
Status: CANCELLED | OUTPATIENT
Start: 2019-09-10

## 2019-09-09 RX ORDER — EPINEPHRINE 1 MG/ML
0.3 INJECTION, SOLUTION, CONCENTRATE INTRAVENOUS PRN
Status: CANCELLED | OUTPATIENT
Start: 2019-09-10

## 2019-09-09 RX ORDER — MEPERIDINE HYDROCHLORIDE 25 MG/ML
12.5 INJECTION INTRAMUSCULAR; INTRAVENOUS; SUBCUTANEOUS ONCE
Status: CANCELLED | OUTPATIENT
Start: 2019-09-10

## 2019-09-09 RX ORDER — SODIUM CHLORIDE 0.9 % (FLUSH) 0.9 %
5 SYRINGE (ML) INJECTION PRN
Status: CANCELLED | OUTPATIENT
Start: 2019-09-10

## 2019-09-10 ENCOUNTER — HOSPITAL ENCOUNTER (OUTPATIENT)
Dept: INFUSION THERAPY | Age: 54
Discharge: HOME | End: 2019-09-10
Payer: COMMERCIAL

## 2019-09-10 ENCOUNTER — OFFICE VISIT (OUTPATIENT)
Dept: ONCOLOGY | Age: 54
End: 2019-09-10
Payer: COMMERCIAL

## 2019-09-10 VITALS
DIASTOLIC BLOOD PRESSURE: 46 MMHG | RESPIRATION RATE: 20 BRPM | SYSTOLIC BLOOD PRESSURE: 94 MMHG | WEIGHT: 131 LBS | TEMPERATURE: 96.3 F | HEART RATE: 63 BPM | HEIGHT: 63 IN | BODY MASS INDEX: 23.21 KG/M2

## 2019-09-10 VITALS
RESPIRATION RATE: 18 BRPM | DIASTOLIC BLOOD PRESSURE: 50 MMHG | SYSTOLIC BLOOD PRESSURE: 111 MMHG | TEMPERATURE: 98 F | HEART RATE: 66 BPM

## 2019-09-10 DIAGNOSIS — C50.911 INFILTRATING DUCTAL CARCINOMA OF RIGHT BREAST, STAGE 1 (HCC): Primary | ICD-10-CM

## 2019-09-10 PROCEDURE — 99214 OFFICE O/P EST MOD 30 MIN: CPT | Performed by: INTERNAL MEDICINE

## 2019-09-10 PROCEDURE — 6360000002 HC RX W HCPCS: Performed by: INTERNAL MEDICINE

## 2019-09-10 PROCEDURE — 2580000003 HC RX 258: Performed by: INTERNAL MEDICINE

## 2019-09-10 PROCEDURE — 96413 CHEMO IV INFUSION 1 HR: CPT

## 2019-09-10 RX ORDER — HEPARIN SODIUM (PORCINE) LOCK FLUSH IV SOLN 100 UNIT/ML 100 UNIT/ML
500 SOLUTION INTRAVENOUS PRN
Status: DISCONTINUED | OUTPATIENT
Start: 2019-09-10 | End: 2019-09-11 | Stop reason: HOSPADM

## 2019-09-10 RX ORDER — SODIUM CHLORIDE 0.9 % (FLUSH) 0.9 %
10 SYRINGE (ML) INJECTION PRN
Status: DISCONTINUED | OUTPATIENT
Start: 2019-09-10 | End: 2019-09-11 | Stop reason: HOSPADM

## 2019-09-10 RX ORDER — SODIUM CHLORIDE 9 MG/ML
20 INJECTION, SOLUTION INTRAVENOUS ONCE
Status: DISCONTINUED | OUTPATIENT
Start: 2019-09-10 | End: 2019-09-11 | Stop reason: HOSPADM

## 2019-09-10 RX ADMIN — Medication 10 ML: at 08:40

## 2019-09-10 RX ADMIN — Medication 10 ML: at 09:29

## 2019-09-10 RX ADMIN — TRASTUZUMAB 357 MG: 150 INJECTION, POWDER, LYOPHILIZED, FOR SOLUTION INTRAVENOUS at 08:41

## 2019-09-10 RX ADMIN — Medication 500 UNITS: at 09:29

## 2019-09-10 RX ADMIN — SODIUM CHLORIDE 20 ML/HR: 9 INJECTION, SOLUTION INTRAVENOUS at 08:41

## 2019-09-10 NOTE — PROGRESS NOTES
Irregular, enhancing mass in the right breast 10:00 is consistent  with biopsy-proven neoplasm. No additional foci of disease identified  in the right breast.  2. There is no lymphadenopathy. 3. No evidence of neoplasm on the left. RECOMMENDATION:  Continued surgical and oncologic consultation is advised. BIRADS 6: Known neoplasm    The patient underwent on 4/18/2019 a right breast needle localized lumpectomy, with SLN excisional biopsy, path:  CANCER CASE SUMMARY  Procedure: Excision (less than total mastectomy; needle localization  partial mastectomy [lumpectomy]). Specimen laterality: Right. Tumor site: Not specified. Tumor size: 0.2 cm (invasive component). Histologic type: Invasive ductal carcinoma. Histologic grade: Glandular/tubular differentiation-score = 3; nuclear  pleomorphism-score = 2; mitotic rate-score = 1.  Overall grade 2;  6/9-moderately differentiated. Tumor focality: Single focus of invasive carcinoma. Ductal carcinoma in situ: Present. Size/extent of DCIS: 0.7 cm.  DCIS completely surrounds a small, residual  focus of infiltrating ductal carcinoma in the current specimen. Architectural pattern: Comedo type. Nuclear grade: Grade III (high nuclear grade). Necrosis: Present, Central (expansive \"comedo\" necrosis). Microcalcifications: Present. Margins: Invasive carcinoma margins-uninvolved by invasive carcinoma. Distance from closest margin-0.9 cm (inferior inked margin). DCIS margins-uninvolved by DCIS.  Distance from closest margin-0.3 cm  (anterior inked margin of resection-specimen A). Note: Specimen C  represents additional anterior/superior breast tissue [3.0 x 2.5 x 1.0  cm] which represents larger final margin of resection for DCIS with  regard to anterior margin of resection). Regional lymph nodes: Uninvolved by tumor cells.  Number of lymph nodes  examined-4 (designated sentinel lymph nodes).  Number of lymph nodes  involved-0.   Treatment effect: No known presurgical

## 2019-09-12 ENCOUNTER — HOSPITAL ENCOUNTER (OUTPATIENT)
Dept: RADIATION ONCOLOGY | Age: 54
Discharge: HOME | End: 2019-09-12
Attending: RADIOLOGY
Payer: COMMERCIAL

## 2019-09-12 PROCEDURE — 77334 RADIATION TREATMENT AID(S): CPT | Performed by: RADIOLOGY

## 2019-09-12 PROCEDURE — 77290 THER RAD SIMULAJ FIELD CPLX: CPT | Performed by: RADIOLOGY

## 2019-09-13 ENCOUNTER — HOSPITAL ENCOUNTER (OUTPATIENT)
Dept: RADIATION ONCOLOGY | Age: 54
Discharge: HOME | End: 2019-09-13
Attending: RADIOLOGY
Payer: COMMERCIAL

## 2019-09-13 PROCEDURE — 77295 3-D RADIOTHERAPY PLAN: CPT | Performed by: RADIOLOGY

## 2019-09-13 PROCEDURE — 77300 RADIATION THERAPY DOSE PLAN: CPT | Performed by: RADIOLOGY

## 2019-09-13 PROCEDURE — 77334 RADIATION TREATMENT AID(S): CPT | Performed by: RADIOLOGY

## 2019-09-19 ENCOUNTER — HOSPITAL ENCOUNTER (OUTPATIENT)
Dept: RADIATION ONCOLOGY | Age: 54
Discharge: HOME | End: 2019-09-19
Attending: RADIOLOGY
Payer: COMMERCIAL

## 2019-09-19 VITALS — DIASTOLIC BLOOD PRESSURE: 78 MMHG | SYSTOLIC BLOOD PRESSURE: 108 MMHG

## 2019-09-19 DIAGNOSIS — C50.911 INFILTRATING DUCTAL CARCINOMA OF RIGHT BREAST, STAGE 1 (HCC): Primary | ICD-10-CM

## 2019-09-19 PROCEDURE — 99999 PR OFFICE/OUTPT VISIT,PROCEDURE ONLY: CPT | Performed by: RADIOLOGY

## 2019-09-19 PROCEDURE — 77412 RADIATION TX DELIVERY LVL 3: CPT | Performed by: RADIOLOGY

## 2019-09-19 PROCEDURE — 77417 THER RADIOLOGY PORT IMAGE(S): CPT | Performed by: RADIOLOGY

## 2019-09-20 ENCOUNTER — HOSPITAL ENCOUNTER (OUTPATIENT)
Dept: RADIATION ONCOLOGY | Age: 54
Discharge: HOME | End: 2019-09-20
Attending: RADIOLOGY
Payer: COMMERCIAL

## 2019-09-20 PROCEDURE — 77412 RADIATION TX DELIVERY LVL 3: CPT | Performed by: RADIOLOGY

## 2019-09-23 ENCOUNTER — TELEPHONE (OUTPATIENT)
Dept: CASE MANAGEMENT | Age: 54
End: 2019-09-23

## 2019-09-23 ENCOUNTER — TELEPHONE (OUTPATIENT)
Dept: ONCOLOGY | Age: 54
End: 2019-09-23

## 2019-09-23 ENCOUNTER — HOSPITAL ENCOUNTER (OUTPATIENT)
Dept: RADIATION ONCOLOGY | Age: 54
Discharge: HOME | End: 2019-09-23
Attending: RADIOLOGY
Payer: COMMERCIAL

## 2019-09-23 PROCEDURE — 77412 RADIATION TX DELIVERY LVL 3: CPT | Performed by: RADIOLOGY

## 2019-09-24 ENCOUNTER — HOSPITAL ENCOUNTER (OUTPATIENT)
Dept: RADIATION ONCOLOGY | Age: 54
Discharge: HOME | End: 2019-09-24
Attending: RADIOLOGY
Payer: COMMERCIAL

## 2019-09-24 PROCEDURE — 77412 RADIATION TX DELIVERY LVL 3: CPT | Performed by: RADIOLOGY

## 2019-09-25 ENCOUNTER — APPOINTMENT (OUTPATIENT)
Dept: RADIATION ONCOLOGY | Age: 54
End: 2019-09-25
Attending: RADIOLOGY
Payer: COMMERCIAL

## 2019-09-25 PROCEDURE — 77336 RADIATION PHYSICS CONSULT: CPT | Performed by: RADIOLOGY

## 2019-09-25 PROCEDURE — 77412 RADIATION TX DELIVERY LVL 3: CPT | Performed by: RADIOLOGY

## 2019-09-26 ENCOUNTER — HOSPITAL ENCOUNTER (OUTPATIENT)
Dept: RADIATION ONCOLOGY | Age: 54
Discharge: HOME | End: 2019-09-26
Attending: RADIOLOGY
Payer: COMMERCIAL

## 2019-09-26 VITALS — WEIGHT: 133.6 LBS | BODY MASS INDEX: 23.67 KG/M2

## 2019-09-26 DIAGNOSIS — C50.911 INFILTRATING DUCTAL CARCINOMA OF RIGHT BREAST, STAGE 1 (HCC): Primary | ICD-10-CM

## 2019-09-26 PROCEDURE — 77412 RADIATION TX DELIVERY LVL 3: CPT | Performed by: RADIOLOGY

## 2019-09-26 PROCEDURE — 99999 PR OFFICE/OUTPT VISIT,PROCEDURE ONLY: CPT | Performed by: RADIOLOGY

## 2019-09-26 PROCEDURE — 77417 THER RADIOLOGY PORT IMAGE(S): CPT | Performed by: RADIOLOGY

## 2019-09-26 NOTE — PROGRESS NOTES
Aryan Uribe  9/26/2019  Wt Readings from Last 3 Encounters:   09/26/19 133 lb 9.6 oz (60.6 kg)   09/10/19 131 lb (59.4 kg)   08/20/19 131 lb (59.4 kg)     Body mass index is 23.67 kg/m². Treatment Area: R breast    Patient was seen today for weekly visit.      Comfort Alteration  KPS:90%  Fatigue: None    Nutritional Alteration  Anorexia: No   Nausea: No   Vomiting: No     Skin Alteration   Sensation:mild    Radiation Dermatitis:  na    Mucous Membrane Alteration  Drainage: No  Lymphedema: No    Emotional  Coping: effective    Sexuality Alteration  absent    Injury, potential bleeding or infection: na        Lab Results   Component Value Date    WBC 5.7 07/15/2019     07/15/2019         Wt 133 lb 9.6 oz (60.6 kg)   BMI 23.67 kg/m²   \    Assessment/Plan:6/1596 cGy  Skin care reviewed, no new complaints    Krisotpher Bond

## 2019-09-27 ENCOUNTER — HOSPITAL ENCOUNTER (OUTPATIENT)
Dept: RADIATION ONCOLOGY | Age: 54
Discharge: HOME | End: 2019-09-27
Attending: RADIOLOGY
Payer: COMMERCIAL

## 2019-09-27 PROCEDURE — 77412 RADIATION TX DELIVERY LVL 3: CPT

## 2019-09-30 ENCOUNTER — HOSPITAL ENCOUNTER (OUTPATIENT)
Dept: RADIATION ONCOLOGY | Age: 54
Discharge: HOME | End: 2019-09-30
Attending: RADIOLOGY
Payer: COMMERCIAL

## 2019-09-30 PROCEDURE — 77412 RADIATION TX DELIVERY LVL 3: CPT | Performed by: RADIOLOGY

## 2019-10-01 ENCOUNTER — HOSPITAL ENCOUNTER (OUTPATIENT)
Dept: RADIATION ONCOLOGY | Age: 54
Discharge: HOME | End: 2019-10-01
Attending: RADIOLOGY
Payer: COMMERCIAL

## 2019-10-01 ENCOUNTER — HOSPITAL ENCOUNTER (OUTPATIENT)
Dept: INFUSION THERAPY | Age: 54
Discharge: HOME | End: 2019-10-01
Payer: COMMERCIAL

## 2019-10-01 ENCOUNTER — OFFICE VISIT (OUTPATIENT)
Dept: ONCOLOGY | Age: 54
End: 2019-10-01
Payer: COMMERCIAL

## 2019-10-01 VITALS
WEIGHT: 131.1 LBS | HEIGHT: 63 IN | BODY MASS INDEX: 23.23 KG/M2 | SYSTOLIC BLOOD PRESSURE: 104 MMHG | DIASTOLIC BLOOD PRESSURE: 49 MMHG | TEMPERATURE: 97.6 F | HEART RATE: 70 BPM

## 2019-10-01 VITALS
HEART RATE: 64 BPM | RESPIRATION RATE: 18 BRPM | DIASTOLIC BLOOD PRESSURE: 55 MMHG | SYSTOLIC BLOOD PRESSURE: 110 MMHG | TEMPERATURE: 97.9 F

## 2019-10-01 DIAGNOSIS — C50.911 INFILTRATING DUCTAL CARCINOMA OF RIGHT BREAST, STAGE 1 (HCC): Primary | ICD-10-CM

## 2019-10-01 DIAGNOSIS — Z78.0 POSTMENOPAUSAL: Primary | ICD-10-CM

## 2019-10-01 PROCEDURE — 2580000003 HC RX 258: Performed by: INTERNAL MEDICINE

## 2019-10-01 PROCEDURE — 77412 RADIATION TX DELIVERY LVL 3: CPT | Performed by: RADIOLOGY

## 2019-10-01 PROCEDURE — 6360000002 HC RX W HCPCS: Performed by: INTERNAL MEDICINE

## 2019-10-01 PROCEDURE — 96413 CHEMO IV INFUSION 1 HR: CPT

## 2019-10-01 PROCEDURE — 99214 OFFICE O/P EST MOD 30 MIN: CPT | Performed by: INTERNAL MEDICINE

## 2019-10-01 RX ORDER — HEPARIN SODIUM (PORCINE) LOCK FLUSH IV SOLN 100 UNIT/ML 100 UNIT/ML
500 SOLUTION INTRAVENOUS PRN
Status: DISCONTINUED | OUTPATIENT
Start: 2019-10-01 | End: 2019-10-02 | Stop reason: HOSPADM

## 2019-10-01 RX ORDER — HEPARIN SODIUM (PORCINE) LOCK FLUSH IV SOLN 100 UNIT/ML 100 UNIT/ML
500 SOLUTION INTRAVENOUS PRN
Status: CANCELLED | OUTPATIENT
Start: 2019-10-01

## 2019-10-01 RX ORDER — SODIUM CHLORIDE 9 MG/ML
20 INJECTION, SOLUTION INTRAVENOUS ONCE
Status: DISCONTINUED | OUTPATIENT
Start: 2019-10-01 | End: 2019-10-02 | Stop reason: HOSPADM

## 2019-10-01 RX ORDER — DIPHENHYDRAMINE HYDROCHLORIDE 50 MG/ML
50 INJECTION INTRAMUSCULAR; INTRAVENOUS ONCE
Status: CANCELLED | OUTPATIENT
Start: 2019-10-01

## 2019-10-01 RX ORDER — METHYLPREDNISOLONE SODIUM SUCCINATE 125 MG/2ML
125 INJECTION, POWDER, LYOPHILIZED, FOR SOLUTION INTRAMUSCULAR; INTRAVENOUS ONCE
Status: CANCELLED | OUTPATIENT
Start: 2019-10-01

## 2019-10-01 RX ORDER — SODIUM CHLORIDE 9 MG/ML
20 INJECTION, SOLUTION INTRAVENOUS ONCE
Status: CANCELLED | OUTPATIENT
Start: 2019-10-01

## 2019-10-01 RX ORDER — MEPERIDINE HYDROCHLORIDE 25 MG/ML
12.5 INJECTION INTRAMUSCULAR; INTRAVENOUS; SUBCUTANEOUS ONCE
Status: CANCELLED | OUTPATIENT
Start: 2019-10-01

## 2019-10-01 RX ORDER — SODIUM CHLORIDE 9 MG/ML
INJECTION, SOLUTION INTRAVENOUS CONTINUOUS
Status: CANCELLED | OUTPATIENT
Start: 2019-10-01

## 2019-10-01 RX ORDER — SODIUM CHLORIDE 0.9 % (FLUSH) 0.9 %
10 SYRINGE (ML) INJECTION PRN
Status: DISCONTINUED | OUTPATIENT
Start: 2019-10-01 | End: 2019-10-02 | Stop reason: HOSPADM

## 2019-10-01 RX ORDER — SODIUM CHLORIDE 0.9 % (FLUSH) 0.9 %
5 SYRINGE (ML) INJECTION PRN
Status: CANCELLED | OUTPATIENT
Start: 2019-10-01

## 2019-10-01 RX ORDER — EPINEPHRINE 1 MG/ML
0.3 INJECTION, SOLUTION, CONCENTRATE INTRAVENOUS PRN
Status: CANCELLED | OUTPATIENT
Start: 2019-10-01

## 2019-10-01 RX ORDER — SODIUM CHLORIDE 0.9 % (FLUSH) 0.9 %
10 SYRINGE (ML) INJECTION PRN
Status: CANCELLED | OUTPATIENT
Start: 2019-10-01

## 2019-10-01 RX ORDER — 0.9 % SODIUM CHLORIDE 0.9 %
10 VIAL (ML) INJECTION ONCE
Status: CANCELLED | OUTPATIENT
Start: 2019-10-01

## 2019-10-01 RX ADMIN — HEPARIN 500 UNITS: 100 SYRINGE at 13:10

## 2019-10-01 RX ADMIN — Medication 10 ML: at 13:10

## 2019-10-01 RX ADMIN — SODIUM CHLORIDE 20 ML/HR: 9 INJECTION, SOLUTION INTRAVENOUS at 12:25

## 2019-10-01 RX ADMIN — Medication 10 ML: at 12:27

## 2019-10-01 RX ADMIN — TRASTUZUMAB 357 MG: 150 INJECTION, POWDER, LYOPHILIZED, FOR SOLUTION INTRAVENOUS at 12:28

## 2019-10-02 ENCOUNTER — HOSPITAL ENCOUNTER (OUTPATIENT)
Dept: RADIATION ONCOLOGY | Age: 54
Discharge: HOME | End: 2019-10-02
Attending: RADIOLOGY
Payer: COMMERCIAL

## 2019-10-02 PROCEDURE — 77412 RADIATION TX DELIVERY LVL 3: CPT | Performed by: RADIOLOGY

## 2019-10-02 PROCEDURE — 77336 RADIATION PHYSICS CONSULT: CPT | Performed by: RADIOLOGY

## 2019-10-03 ENCOUNTER — HOSPITAL ENCOUNTER (OUTPATIENT)
Dept: RADIATION ONCOLOGY | Age: 54
Discharge: HOME | End: 2019-10-03
Attending: RADIOLOGY
Payer: COMMERCIAL

## 2019-10-03 VITALS — BODY MASS INDEX: 23.21 KG/M2 | WEIGHT: 131 LBS

## 2019-10-03 DIAGNOSIS — C50.911 INFILTRATING DUCTAL CARCINOMA OF RIGHT BREAST, STAGE 1 (HCC): Primary | ICD-10-CM

## 2019-10-03 PROCEDURE — 99999 PR OFFICE/OUTPT VISIT,PROCEDURE ONLY: CPT | Performed by: RADIOLOGY

## 2019-10-03 PROCEDURE — 77412 RADIATION TX DELIVERY LVL 3: CPT | Performed by: RADIOLOGY

## 2019-10-03 PROCEDURE — 77417 THER RADIOLOGY PORT IMAGE(S): CPT | Performed by: RADIOLOGY

## 2019-10-04 ENCOUNTER — HOSPITAL ENCOUNTER (OUTPATIENT)
Dept: RADIATION ONCOLOGY | Age: 54
Discharge: HOME | End: 2019-10-04
Attending: RADIOLOGY
Payer: COMMERCIAL

## 2019-10-04 PROCEDURE — 77412 RADIATION TX DELIVERY LVL 3: CPT | Performed by: RADIOLOGY

## 2019-10-07 ENCOUNTER — HOSPITAL ENCOUNTER (OUTPATIENT)
Dept: RADIATION ONCOLOGY | Age: 54
Discharge: HOME | End: 2019-10-07
Attending: RADIOLOGY
Payer: COMMERCIAL

## 2019-10-07 PROCEDURE — 77412 RADIATION TX DELIVERY LVL 3: CPT | Performed by: RADIOLOGY

## 2019-10-08 ENCOUNTER — HOSPITAL ENCOUNTER (OUTPATIENT)
Dept: RADIATION ONCOLOGY | Age: 54
Discharge: HOME | End: 2019-10-08
Attending: RADIOLOGY
Payer: COMMERCIAL

## 2019-10-08 PROCEDURE — 77412 RADIATION TX DELIVERY LVL 3: CPT | Performed by: RADIOLOGY

## 2019-10-09 ENCOUNTER — HOSPITAL ENCOUNTER (OUTPATIENT)
Dept: RADIATION ONCOLOGY | Age: 54
Discharge: HOME | End: 2019-10-09
Attending: RADIOLOGY
Payer: COMMERCIAL

## 2019-10-09 PROCEDURE — 77412 RADIATION TX DELIVERY LVL 3: CPT | Performed by: RADIOLOGY

## 2019-10-09 PROCEDURE — 77336 RADIATION PHYSICS CONSULT: CPT | Performed by: RADIOLOGY

## 2019-10-10 ENCOUNTER — HOSPITAL ENCOUNTER (OUTPATIENT)
Dept: RADIATION ONCOLOGY | Age: 54
Discharge: HOME | End: 2019-10-10
Attending: RADIOLOGY
Payer: COMMERCIAL

## 2019-10-10 VITALS
RESPIRATION RATE: 20 BRPM | WEIGHT: 131.25 LBS | SYSTOLIC BLOOD PRESSURE: 100 MMHG | HEART RATE: 65 BPM | BODY MASS INDEX: 23.25 KG/M2 | DIASTOLIC BLOOD PRESSURE: 66 MMHG | OXYGEN SATURATION: 97 %

## 2019-10-10 DIAGNOSIS — C50.911 INFILTRATING DUCTAL CARCINOMA OF RIGHT BREAST, STAGE 1 (HCC): Primary | ICD-10-CM

## 2019-10-10 PROCEDURE — 99999 PR OFFICE/OUTPT VISIT,PROCEDURE ONLY: CPT | Performed by: RADIOLOGY

## 2019-10-10 PROCEDURE — 77412 RADIATION TX DELIVERY LVL 3: CPT | Performed by: RADIOLOGY

## 2019-10-22 ENCOUNTER — HOSPITAL ENCOUNTER (OUTPATIENT)
Dept: INFUSION THERAPY | Age: 54
Discharge: HOME | End: 2019-10-22
Payer: COMMERCIAL

## 2019-10-22 ENCOUNTER — OFFICE VISIT (OUTPATIENT)
Dept: ONCOLOGY | Age: 54
End: 2019-10-22
Payer: COMMERCIAL

## 2019-10-22 VITALS
TEMPERATURE: 97.9 F | DIASTOLIC BLOOD PRESSURE: 55 MMHG | HEART RATE: 71 BPM | WEIGHT: 131.9 LBS | HEIGHT: 63 IN | SYSTOLIC BLOOD PRESSURE: 105 MMHG | BODY MASS INDEX: 23.37 KG/M2

## 2019-10-22 VITALS
SYSTOLIC BLOOD PRESSURE: 105 MMHG | RESPIRATION RATE: 18 BRPM | HEART RATE: 57 BPM | TEMPERATURE: 97.9 F | DIASTOLIC BLOOD PRESSURE: 51 MMHG

## 2019-10-22 DIAGNOSIS — C50.911 INFILTRATING DUCTAL CARCINOMA OF RIGHT BREAST, STAGE 1 (HCC): Primary | ICD-10-CM

## 2019-10-22 PROCEDURE — 6360000002 HC RX W HCPCS: Performed by: INTERNAL MEDICINE

## 2019-10-22 PROCEDURE — 2580000003 HC RX 258: Performed by: INTERNAL MEDICINE

## 2019-10-22 PROCEDURE — 2580000003 HC RX 258

## 2019-10-22 PROCEDURE — 99214 OFFICE O/P EST MOD 30 MIN: CPT | Performed by: INTERNAL MEDICINE

## 2019-10-22 PROCEDURE — 96413 CHEMO IV INFUSION 1 HR: CPT

## 2019-10-22 RX ORDER — SODIUM CHLORIDE 9 MG/ML
20 INJECTION, SOLUTION INTRAVENOUS ONCE
Status: DISCONTINUED | OUTPATIENT
Start: 2019-10-22 | End: 2019-10-23 | Stop reason: HOSPADM

## 2019-10-22 RX ORDER — SODIUM CHLORIDE 9 MG/ML
INJECTION, SOLUTION INTRAVENOUS CONTINUOUS
Status: CANCELLED | OUTPATIENT
Start: 2019-10-22

## 2019-10-22 RX ORDER — SODIUM CHLORIDE 0.9 % (FLUSH) 0.9 %
5 SYRINGE (ML) INJECTION PRN
Status: CANCELLED | OUTPATIENT
Start: 2019-10-22

## 2019-10-22 RX ORDER — B-COMPLEX WITH VITAMIN C
1 TABLET ORAL DAILY
Qty: 30 TABLET | Refills: 3 | Status: SHIPPED | OUTPATIENT
Start: 2019-10-22 | End: 2022-05-18

## 2019-10-22 RX ORDER — MEPERIDINE HYDROCHLORIDE 25 MG/ML
12.5 INJECTION INTRAMUSCULAR; INTRAVENOUS; SUBCUTANEOUS ONCE
Status: CANCELLED | OUTPATIENT
Start: 2019-10-22

## 2019-10-22 RX ORDER — EPINEPHRINE 1 MG/ML
0.3 INJECTION, SOLUTION, CONCENTRATE INTRAVENOUS PRN
Status: CANCELLED | OUTPATIENT
Start: 2019-10-22

## 2019-10-22 RX ORDER — DIPHENHYDRAMINE HYDROCHLORIDE 50 MG/ML
50 INJECTION INTRAMUSCULAR; INTRAVENOUS ONCE
Status: CANCELLED | OUTPATIENT
Start: 2019-10-22

## 2019-10-22 RX ORDER — SODIUM CHLORIDE 9 MG/ML
INJECTION, SOLUTION INTRAVENOUS
Status: DISCONTINUED
Start: 2019-10-22 | End: 2019-10-23 | Stop reason: HOSPADM

## 2019-10-22 RX ORDER — SODIUM CHLORIDE 0.9 % (FLUSH) 0.9 %
10 SYRINGE (ML) INJECTION PRN
Status: DISCONTINUED | OUTPATIENT
Start: 2019-10-22 | End: 2019-10-23 | Stop reason: HOSPADM

## 2019-10-22 RX ORDER — ANASTROZOLE 1 MG/1
1 TABLET ORAL DAILY
Qty: 30 TABLET | Refills: 0 | Status: SHIPPED | OUTPATIENT
Start: 2019-10-22 | End: 2019-11-13 | Stop reason: SDUPTHER

## 2019-10-22 RX ORDER — 0.9 % SODIUM CHLORIDE 0.9 %
10 VIAL (ML) INJECTION ONCE
Status: CANCELLED | OUTPATIENT
Start: 2019-10-22

## 2019-10-22 RX ORDER — HEPARIN SODIUM (PORCINE) LOCK FLUSH IV SOLN 100 UNIT/ML 100 UNIT/ML
500 SOLUTION INTRAVENOUS PRN
Status: CANCELLED | OUTPATIENT
Start: 2019-10-22

## 2019-10-22 RX ORDER — HEPARIN SODIUM (PORCINE) LOCK FLUSH IV SOLN 100 UNIT/ML 100 UNIT/ML
500 SOLUTION INTRAVENOUS PRN
Status: DISCONTINUED | OUTPATIENT
Start: 2019-10-22 | End: 2019-10-23 | Stop reason: HOSPADM

## 2019-10-22 RX ORDER — SODIUM CHLORIDE 0.9 % (FLUSH) 0.9 %
10 SYRINGE (ML) INJECTION PRN
Status: CANCELLED | OUTPATIENT
Start: 2019-10-22

## 2019-10-22 RX ORDER — METHYLPREDNISOLONE SODIUM SUCCINATE 125 MG/2ML
125 INJECTION, POWDER, LYOPHILIZED, FOR SOLUTION INTRAMUSCULAR; INTRAVENOUS ONCE
Status: CANCELLED | OUTPATIENT
Start: 2019-10-22

## 2019-10-22 RX ORDER — SODIUM CHLORIDE 9 MG/ML
20 INJECTION, SOLUTION INTRAVENOUS ONCE
Status: CANCELLED | OUTPATIENT
Start: 2019-10-22

## 2019-10-22 RX ADMIN — Medication 10 ML: at 12:26

## 2019-10-22 RX ADMIN — SODIUM CHLORIDE 20 ML/HR: 9 INJECTION, SOLUTION INTRAVENOUS at 13:01

## 2019-10-22 RX ADMIN — TRASTUZUMAB 357 MG: 150 INJECTION, POWDER, LYOPHILIZED, FOR SOLUTION INTRAVENOUS at 13:02

## 2019-10-22 RX ADMIN — Medication 10 ML: at 13:49

## 2019-10-22 RX ADMIN — HEPARIN 500 UNITS: 100 SYRINGE at 13:49

## 2019-11-11 ENCOUNTER — TELEPHONE (OUTPATIENT)
Dept: CASE MANAGEMENT | Age: 54
End: 2019-11-11

## 2019-11-12 ENCOUNTER — HOSPITAL ENCOUNTER (OUTPATIENT)
Dept: INFUSION THERAPY | Age: 54
Discharge: HOME | End: 2019-11-12
Payer: COMMERCIAL

## 2019-11-12 ENCOUNTER — OFFICE VISIT (OUTPATIENT)
Dept: ONCOLOGY | Age: 54
End: 2019-11-12
Payer: COMMERCIAL

## 2019-11-12 VITALS
RESPIRATION RATE: 18 BRPM | SYSTOLIC BLOOD PRESSURE: 112 MMHG | DIASTOLIC BLOOD PRESSURE: 63 MMHG | TEMPERATURE: 97.1 F | HEART RATE: 68 BPM

## 2019-11-12 VITALS
WEIGHT: 131.9 LBS | DIASTOLIC BLOOD PRESSURE: 66 MMHG | SYSTOLIC BLOOD PRESSURE: 120 MMHG | HEART RATE: 72 BPM | TEMPERATURE: 97.3 F | BODY MASS INDEX: 23.37 KG/M2 | HEIGHT: 63 IN

## 2019-11-12 DIAGNOSIS — C50.911 INFILTRATING DUCTAL CARCINOMA OF RIGHT BREAST, STAGE 1 (HCC): Primary | ICD-10-CM

## 2019-11-12 PROCEDURE — 2580000003 HC RX 258: Performed by: INTERNAL MEDICINE

## 2019-11-12 PROCEDURE — 6360000002 HC RX W HCPCS: Performed by: INTERNAL MEDICINE

## 2019-11-12 PROCEDURE — 96413 CHEMO IV INFUSION 1 HR: CPT

## 2019-11-12 PROCEDURE — 99214 OFFICE O/P EST MOD 30 MIN: CPT | Performed by: INTERNAL MEDICINE

## 2019-11-12 RX ORDER — MEPERIDINE HYDROCHLORIDE 25 MG/ML
12.5 INJECTION INTRAMUSCULAR; INTRAVENOUS; SUBCUTANEOUS ONCE
Status: CANCELLED | OUTPATIENT
Start: 2019-11-12

## 2019-11-12 RX ORDER — SODIUM CHLORIDE 9 MG/ML
20 INJECTION, SOLUTION INTRAVENOUS ONCE
Status: CANCELLED | OUTPATIENT
Start: 2019-11-12

## 2019-11-12 RX ORDER — METHYLPREDNISOLONE SODIUM SUCCINATE 125 MG/2ML
125 INJECTION, POWDER, LYOPHILIZED, FOR SOLUTION INTRAMUSCULAR; INTRAVENOUS ONCE
Status: CANCELLED | OUTPATIENT
Start: 2019-11-12

## 2019-11-12 RX ORDER — SODIUM CHLORIDE 9 MG/ML
20 INJECTION, SOLUTION INTRAVENOUS ONCE
Status: DISCONTINUED | OUTPATIENT
Start: 2019-11-12 | End: 2019-11-13 | Stop reason: HOSPADM

## 2019-11-12 RX ORDER — SODIUM CHLORIDE 0.9 % (FLUSH) 0.9 %
10 SYRINGE (ML) INJECTION PRN
Status: CANCELLED | OUTPATIENT
Start: 2019-11-12

## 2019-11-12 RX ORDER — EPINEPHRINE 1 MG/ML
0.3 INJECTION, SOLUTION, CONCENTRATE INTRAVENOUS PRN
Status: CANCELLED | OUTPATIENT
Start: 2019-11-12

## 2019-11-12 RX ORDER — SODIUM CHLORIDE 0.9 % (FLUSH) 0.9 %
5 SYRINGE (ML) INJECTION PRN
Status: CANCELLED | OUTPATIENT
Start: 2019-11-12

## 2019-11-12 RX ORDER — 0.9 % SODIUM CHLORIDE 0.9 %
10 VIAL (ML) INJECTION ONCE
Status: CANCELLED | OUTPATIENT
Start: 2019-11-12

## 2019-11-12 RX ORDER — SODIUM CHLORIDE 0.9 % (FLUSH) 0.9 %
10 SYRINGE (ML) INJECTION PRN
Status: DISCONTINUED | OUTPATIENT
Start: 2019-11-12 | End: 2019-11-13 | Stop reason: HOSPADM

## 2019-11-12 RX ORDER — SODIUM CHLORIDE 9 MG/ML
INJECTION, SOLUTION INTRAVENOUS CONTINUOUS
Status: CANCELLED | OUTPATIENT
Start: 2019-11-12

## 2019-11-12 RX ORDER — DIPHENHYDRAMINE HYDROCHLORIDE 50 MG/ML
50 INJECTION INTRAMUSCULAR; INTRAVENOUS ONCE
Status: CANCELLED | OUTPATIENT
Start: 2019-11-12

## 2019-11-12 RX ORDER — HEPARIN SODIUM (PORCINE) LOCK FLUSH IV SOLN 100 UNIT/ML 100 UNIT/ML
500 SOLUTION INTRAVENOUS PRN
Status: CANCELLED | OUTPATIENT
Start: 2019-11-12

## 2019-11-12 RX ORDER — HEPARIN SODIUM (PORCINE) LOCK FLUSH IV SOLN 100 UNIT/ML 100 UNIT/ML
500 SOLUTION INTRAVENOUS PRN
Status: DISCONTINUED | OUTPATIENT
Start: 2019-11-12 | End: 2019-11-13 | Stop reason: HOSPADM

## 2019-11-12 RX ADMIN — TRASTUZUMAB 357 MG: 150 INJECTION, POWDER, LYOPHILIZED, FOR SOLUTION INTRAVENOUS at 12:33

## 2019-11-12 RX ADMIN — Medication 10 ML: at 12:06

## 2019-11-12 RX ADMIN — SODIUM CHLORIDE 20 ML/HR: 9 INJECTION, SOLUTION INTRAVENOUS at 12:07

## 2019-11-12 RX ADMIN — HEPARIN 500 UNITS: 100 SYRINGE at 13:14

## 2019-11-13 ENCOUNTER — TELEPHONE (OUTPATIENT)
Dept: ONCOLOGY | Age: 54
End: 2019-11-13

## 2019-11-13 DIAGNOSIS — C50.911 INFILTRATING DUCTAL CARCINOMA OF RIGHT BREAST, STAGE 1 (HCC): ICD-10-CM

## 2019-11-13 RX ORDER — ANASTROZOLE 1 MG/1
1 TABLET ORAL DAILY
Qty: 90 TABLET | Refills: 2 | Status: SHIPPED
Start: 2019-11-13 | End: 2020-07-14 | Stop reason: SDUPTHER

## 2019-11-26 ENCOUNTER — HOSPITAL ENCOUNTER (OUTPATIENT)
Dept: RADIATION ONCOLOGY | Age: 54
Discharge: HOME | End: 2019-11-26
Attending: RADIOLOGY
Payer: COMMERCIAL

## 2019-11-26 VITALS
HEIGHT: 63 IN | WEIGHT: 131.5 LBS | DIASTOLIC BLOOD PRESSURE: 51 MMHG | BODY MASS INDEX: 23.3 KG/M2 | HEART RATE: 58 BPM | SYSTOLIC BLOOD PRESSURE: 105 MMHG | RESPIRATION RATE: 16 BRPM | TEMPERATURE: 98.1 F

## 2019-11-26 DIAGNOSIS — C50.911 INFILTRATING DUCTAL CARCINOMA OF RIGHT BREAST, STAGE 1 (HCC): Primary | ICD-10-CM

## 2019-11-26 PROCEDURE — 99999 PR OFFICE/OUTPT VISIT,PROCEDURE ONLY: CPT | Performed by: NURSE PRACTITIONER

## 2019-11-26 SDOH — SOCIAL STABILITY: SOCIAL INSECURITY
WITHIN THE LAST YEAR, HAVE YOU BEEN KICKED, HIT, SLAPPED, OR OTHERWISE PHYSICALLY HURT BY YOUR PARTNER OR EX-PARTNER?: NO

## 2019-11-26 SDOH — SOCIAL STABILITY: SOCIAL INSECURITY
WITHIN THE LAST YEAR, HAVE TO BEEN RAPED OR FORCED TO HAVE ANY KIND OF SEXUAL ACTIVITY BY YOUR PARTNER OR EX-PARTNER?: NO

## 2019-11-26 SDOH — ECONOMIC STABILITY: INCOME INSECURITY: HOW HARD IS IT FOR YOU TO PAY FOR THE VERY BASICS LIKE FOOD, HOUSING, MEDICAL CARE, AND HEATING?: NOT HARD AT ALL

## 2019-11-26 SDOH — SOCIAL STABILITY: SOCIAL NETWORK
DO YOU BELONG TO ANY CLUBS OR ORGANIZATIONS SUCH AS CHURCH GROUPS UNIONS, FRATERNAL OR ATHLETIC GROUPS, OR SCHOOL GROUPS?: NO

## 2019-11-26 SDOH — HEALTH STABILITY: PHYSICAL HEALTH: ON AVERAGE, HOW MANY DAYS PER WEEK DO YOU ENGAGE IN MODERATE TO STRENUOUS EXERCISE (LIKE A BRISK WALK)?: 4 DAYS

## 2019-11-26 SDOH — ECONOMIC STABILITY: TRANSPORTATION INSECURITY
IN THE PAST 12 MONTHS, HAS THE LACK OF TRANSPORTATION KEPT YOU FROM MEDICAL APPOINTMENTS OR FROM GETTING MEDICATIONS?: NO

## 2019-11-26 SDOH — SOCIAL STABILITY: SOCIAL INSECURITY: WITHIN THE LAST YEAR, HAVE YOU BEEN AFRAID OF YOUR PARTNER OR EX-PARTNER?: NO

## 2019-11-26 SDOH — HEALTH STABILITY: MENTAL HEALTH
STRESS IS WHEN SOMEONE FEELS TENSE, NERVOUS, ANXIOUS, OR CAN'T SLEEP AT NIGHT BECAUSE THEIR MIND IS TROUBLED. HOW STRESSED ARE YOU?: TO SOME EXTENT

## 2019-11-26 SDOH — SOCIAL STABILITY: SOCIAL NETWORK
IN A TYPICAL WEEK, HOW MANY TIMES DO YOU TALK ON THE PHONE WITH FAMILY, FRIENDS, OR NEIGHBORS?: MORE THAN THREE TIMES A WEEK

## 2019-11-26 SDOH — SOCIAL STABILITY: SOCIAL NETWORK: HOW OFTEN DO YOU ATTENT MEETINGS OF THE CLUB OR ORGANIZATION YOU BELONG TO?: NEVER

## 2019-11-26 SDOH — HEALTH STABILITY: PHYSICAL HEALTH: ON AVERAGE, HOW MANY MINUTES DO YOU ENGAGE IN EXERCISE AT THIS LEVEL?: 40 MIN

## 2019-11-26 SDOH — ECONOMIC STABILITY: FOOD INSECURITY: WITHIN THE PAST 12 MONTHS, YOU WORRIED THAT YOUR FOOD WOULD RUN OUT BEFORE YOU GOT MONEY TO BUY MORE.: NEVER TRUE

## 2019-11-26 SDOH — ECONOMIC STABILITY: FOOD INSECURITY: WITHIN THE PAST 12 MONTHS, THE FOOD YOU BOUGHT JUST DIDN'T LAST AND YOU DIDN'T HAVE MONEY TO GET MORE.: NEVER TRUE

## 2019-11-26 SDOH — SOCIAL STABILITY: SOCIAL INSECURITY: WITHIN THE LAST YEAR, HAVE YOU BEEN HUMILIATED OR EMOTIONALLY ABUSED IN OTHER WAYS BY YOUR PARTNER OR EX-PARTNER?: NO

## 2019-11-26 SDOH — ECONOMIC STABILITY: TRANSPORTATION INSECURITY
IN THE PAST 12 MONTHS, HAS LACK OF TRANSPORTATION KEPT YOU FROM MEETINGS, WORK, OR FROM GETTING THINGS NEEDED FOR DAILY LIVING?: NO

## 2019-11-26 SDOH — SOCIAL STABILITY: SOCIAL NETWORK: ARE YOU MARRIED, WIDOWED, DIVORCED, SEPARATED, NEVER MARRIED, OR LIVING WITH A PARTNER?: MARRIED

## 2019-11-26 SDOH — SOCIAL STABILITY: SOCIAL NETWORK: HOW OFTEN DO YOU ATTEND CHURCH OR RELIGIOUS SERVICES?: 1 TO 4 TIMES PER YEAR

## 2019-11-26 SDOH — SOCIAL STABILITY: SOCIAL NETWORK: HOW OFTEN DO YOU GET TOGETHER WITH FRIENDS OR RELATIVES?: MORE THAN THREE TIMES A WEEK

## 2019-12-03 ENCOUNTER — HOSPITAL ENCOUNTER (OUTPATIENT)
Dept: INFUSION THERAPY | Age: 54
Discharge: HOME | End: 2019-12-03
Payer: COMMERCIAL

## 2019-12-03 ENCOUNTER — OFFICE VISIT (OUTPATIENT)
Dept: ONCOLOGY | Age: 54
End: 2019-12-03
Payer: COMMERCIAL

## 2019-12-03 VITALS
RESPIRATION RATE: 16 BRPM | SYSTOLIC BLOOD PRESSURE: 118 MMHG | DIASTOLIC BLOOD PRESSURE: 58 MMHG | TEMPERATURE: 99.1 F | HEART RATE: 64 BPM

## 2019-12-03 VITALS
HEIGHT: 63 IN | BODY MASS INDEX: 23.35 KG/M2 | OXYGEN SATURATION: 99 % | SYSTOLIC BLOOD PRESSURE: 123 MMHG | WEIGHT: 131.8 LBS | DIASTOLIC BLOOD PRESSURE: 52 MMHG | TEMPERATURE: 99 F | HEART RATE: 73 BPM

## 2019-12-03 DIAGNOSIS — C50.911 INFILTRATING DUCTAL CARCINOMA OF RIGHT BREAST, STAGE 1 (HCC): Primary | ICD-10-CM

## 2019-12-03 PROCEDURE — 99214 OFFICE O/P EST MOD 30 MIN: CPT | Performed by: INTERNAL MEDICINE

## 2019-12-03 PROCEDURE — 6360000002 HC RX W HCPCS: Performed by: INTERNAL MEDICINE

## 2019-12-03 PROCEDURE — 2580000003 HC RX 258: Performed by: INTERNAL MEDICINE

## 2019-12-03 PROCEDURE — 96413 CHEMO IV INFUSION 1 HR: CPT

## 2019-12-03 RX ORDER — SODIUM CHLORIDE 9 MG/ML
20 INJECTION, SOLUTION INTRAVENOUS ONCE
Status: CANCELLED | OUTPATIENT
Start: 2019-12-03

## 2019-12-03 RX ORDER — EPINEPHRINE 1 MG/ML
0.3 INJECTION, SOLUTION, CONCENTRATE INTRAVENOUS PRN
Status: CANCELLED | OUTPATIENT
Start: 2019-12-03

## 2019-12-03 RX ORDER — MEPERIDINE HYDROCHLORIDE 25 MG/ML
12.5 INJECTION INTRAMUSCULAR; INTRAVENOUS; SUBCUTANEOUS ONCE
Status: CANCELLED | OUTPATIENT
Start: 2019-12-03

## 2019-12-03 RX ORDER — HEPARIN SODIUM (PORCINE) LOCK FLUSH IV SOLN 100 UNIT/ML 100 UNIT/ML
500 SOLUTION INTRAVENOUS PRN
Status: CANCELLED | OUTPATIENT
Start: 2019-12-03

## 2019-12-03 RX ORDER — DIPHENHYDRAMINE HYDROCHLORIDE 50 MG/ML
50 INJECTION INTRAMUSCULAR; INTRAVENOUS ONCE
Status: CANCELLED | OUTPATIENT
Start: 2019-12-03

## 2019-12-03 RX ORDER — SODIUM CHLORIDE 9 MG/ML
INJECTION, SOLUTION INTRAVENOUS CONTINUOUS
Status: CANCELLED | OUTPATIENT
Start: 2019-12-03

## 2019-12-03 RX ORDER — METHYLPREDNISOLONE SODIUM SUCCINATE 125 MG/2ML
125 INJECTION, POWDER, LYOPHILIZED, FOR SOLUTION INTRAMUSCULAR; INTRAVENOUS ONCE
Status: CANCELLED | OUTPATIENT
Start: 2019-12-03

## 2019-12-03 RX ORDER — SODIUM CHLORIDE 0.9 % (FLUSH) 0.9 %
10 SYRINGE (ML) INJECTION PRN
Status: DISCONTINUED | OUTPATIENT
Start: 2019-12-03 | End: 2019-12-04 | Stop reason: HOSPADM

## 2019-12-03 RX ORDER — 0.9 % SODIUM CHLORIDE 0.9 %
10 VIAL (ML) INJECTION ONCE
Status: CANCELLED | OUTPATIENT
Start: 2019-12-03

## 2019-12-03 RX ORDER — SODIUM CHLORIDE 9 MG/ML
20 INJECTION, SOLUTION INTRAVENOUS ONCE
Status: DISCONTINUED | OUTPATIENT
Start: 2019-12-03 | End: 2019-12-04 | Stop reason: HOSPADM

## 2019-12-03 RX ORDER — SODIUM CHLORIDE 0.9 % (FLUSH) 0.9 %
5 SYRINGE (ML) INJECTION PRN
Status: CANCELLED | OUTPATIENT
Start: 2019-12-03

## 2019-12-03 RX ORDER — HEPARIN SODIUM (PORCINE) LOCK FLUSH IV SOLN 100 UNIT/ML 100 UNIT/ML
500 SOLUTION INTRAVENOUS PRN
Status: DISCONTINUED | OUTPATIENT
Start: 2019-12-03 | End: 2019-12-04 | Stop reason: HOSPADM

## 2019-12-03 RX ORDER — SODIUM CHLORIDE 0.9 % (FLUSH) 0.9 %
10 SYRINGE (ML) INJECTION PRN
Status: CANCELLED | OUTPATIENT
Start: 2019-12-03

## 2019-12-03 RX ADMIN — SODIUM CHLORIDE 20 ML/HR: 9 INJECTION, SOLUTION INTRAVENOUS at 09:18

## 2019-12-03 RX ADMIN — HEPARIN SODIUM (PORCINE) LOCK FLUSH IV SOLN 100 UNIT/ML 500 UNITS: 100 SOLUTION at 10:32

## 2019-12-03 RX ADMIN — TRASTUZUMAB 357 MG: 150 INJECTION, POWDER, LYOPHILIZED, FOR SOLUTION INTRAVENOUS at 09:34

## 2020-01-07 ENCOUNTER — OFFICE VISIT (OUTPATIENT)
Dept: ONCOLOGY | Age: 55
End: 2020-01-07
Payer: COMMERCIAL

## 2020-01-07 ENCOUNTER — HOSPITAL ENCOUNTER (OUTPATIENT)
Dept: INFUSION THERAPY | Age: 55
Discharge: HOME | End: 2020-01-07
Payer: COMMERCIAL

## 2020-01-07 VITALS — DIASTOLIC BLOOD PRESSURE: 55 MMHG | SYSTOLIC BLOOD PRESSURE: 108 MMHG | RESPIRATION RATE: 16 BRPM | HEART RATE: 63 BPM

## 2020-01-07 VITALS
TEMPERATURE: 98.6 F | WEIGHT: 135.3 LBS | HEART RATE: 62 BPM | SYSTOLIC BLOOD PRESSURE: 105 MMHG | DIASTOLIC BLOOD PRESSURE: 51 MMHG | HEIGHT: 63 IN | BODY MASS INDEX: 23.97 KG/M2 | OXYGEN SATURATION: 97 %

## 2020-01-07 DIAGNOSIS — C50.911 INFILTRATING DUCTAL CARCINOMA OF RIGHT BREAST, STAGE 1 (HCC): Primary | ICD-10-CM

## 2020-01-07 PROCEDURE — 2580000003 HC RX 258: Performed by: INTERNAL MEDICINE

## 2020-01-07 PROCEDURE — 99214 OFFICE O/P EST MOD 30 MIN: CPT | Performed by: INTERNAL MEDICINE

## 2020-01-07 PROCEDURE — 96413 CHEMO IV INFUSION 1 HR: CPT

## 2020-01-07 PROCEDURE — 6360000002 HC RX W HCPCS: Performed by: INTERNAL MEDICINE

## 2020-01-07 RX ORDER — SODIUM CHLORIDE 9 MG/ML
INJECTION, SOLUTION INTRAVENOUS CONTINUOUS
Status: CANCELLED | OUTPATIENT
Start: 2020-01-07

## 2020-01-07 RX ORDER — HEPARIN SODIUM (PORCINE) LOCK FLUSH IV SOLN 100 UNIT/ML 100 UNIT/ML
500 SOLUTION INTRAVENOUS PRN
Status: CANCELLED | OUTPATIENT
Start: 2020-01-07

## 2020-01-07 RX ORDER — 0.9 % SODIUM CHLORIDE 0.9 %
10 VIAL (ML) INJECTION ONCE
Status: CANCELLED | OUTPATIENT
Start: 2020-01-07

## 2020-01-07 RX ORDER — DIPHENHYDRAMINE HYDROCHLORIDE 50 MG/ML
50 INJECTION INTRAMUSCULAR; INTRAVENOUS ONCE
Status: CANCELLED | OUTPATIENT
Start: 2020-01-07

## 2020-01-07 RX ORDER — EPINEPHRINE 1 MG/ML
0.3 INJECTION, SOLUTION, CONCENTRATE INTRAVENOUS PRN
Status: CANCELLED | OUTPATIENT
Start: 2020-01-07

## 2020-01-07 RX ORDER — SODIUM CHLORIDE 0.9 % (FLUSH) 0.9 %
5 SYRINGE (ML) INJECTION PRN
Status: CANCELLED | OUTPATIENT
Start: 2020-01-07

## 2020-01-07 RX ORDER — SODIUM CHLORIDE 9 MG/ML
20 INJECTION, SOLUTION INTRAVENOUS ONCE
Status: DISCONTINUED | OUTPATIENT
Start: 2020-01-07 | End: 2020-01-08 | Stop reason: HOSPADM

## 2020-01-07 RX ORDER — SODIUM CHLORIDE 0.9 % (FLUSH) 0.9 %
10 SYRINGE (ML) INJECTION PRN
Status: CANCELLED | OUTPATIENT
Start: 2020-01-07

## 2020-01-07 RX ORDER — MEPERIDINE HYDROCHLORIDE 25 MG/ML
12.5 INJECTION INTRAMUSCULAR; INTRAVENOUS; SUBCUTANEOUS ONCE
Status: CANCELLED | OUTPATIENT
Start: 2020-01-07

## 2020-01-07 RX ORDER — METHYLPREDNISOLONE SODIUM SUCCINATE 125 MG/2ML
125 INJECTION, POWDER, LYOPHILIZED, FOR SOLUTION INTRAMUSCULAR; INTRAVENOUS ONCE
Status: CANCELLED | OUTPATIENT
Start: 2020-01-07

## 2020-01-07 RX ORDER — SODIUM CHLORIDE 0.9 % (FLUSH) 0.9 %
10 SYRINGE (ML) INJECTION PRN
Status: DISCONTINUED | OUTPATIENT
Start: 2020-01-07 | End: 2020-01-08 | Stop reason: HOSPADM

## 2020-01-07 RX ORDER — SODIUM CHLORIDE 9 MG/ML
20 INJECTION, SOLUTION INTRAVENOUS ONCE
Status: CANCELLED | OUTPATIENT
Start: 2020-01-07

## 2020-01-07 RX ORDER — HEPARIN SODIUM (PORCINE) LOCK FLUSH IV SOLN 100 UNIT/ML 100 UNIT/ML
500 SOLUTION INTRAVENOUS PRN
Status: DISCONTINUED | OUTPATIENT
Start: 2020-01-07 | End: 2020-01-08 | Stop reason: HOSPADM

## 2020-01-07 RX ADMIN — HEPARIN 500 UNITS: 100 SYRINGE at 10:20

## 2020-01-07 RX ADMIN — TRASTUZUMAB 357 MG: 150 INJECTION, POWDER, LYOPHILIZED, FOR SOLUTION INTRAVENOUS at 09:43

## 2020-01-07 RX ADMIN — SODIUM CHLORIDE, PRESERVATIVE FREE 10 ML: 5 INJECTION INTRAVENOUS at 10:20

## 2020-01-07 RX ADMIN — SODIUM CHLORIDE 20 ML/HR: 9 INJECTION, SOLUTION INTRAVENOUS at 09:08

## 2020-01-07 NOTE — PROGRESS NOTES
[facility] [facility]  [address] [place] [id number]  Dept: [phone]  Attending Progress Note      Reason for Visit:   Right Breast Cancer. Referring Physician:  Rosa Marquez MD    PCP:  Manjinder Schroeder MD    History of Present Illness: The patient is a 53 y.o. lady with a PMH significant for hyperlipidemia, who had presented with an abnormal screening Mammogram, was done on 3/12/2019, revealing calcifications in the right UOQ, she had a right breast US done on 3/13/89057, no masses were seen, she underwent on 3/29/2019 a stereotactic biopsy of the right breast calcifications. Due to biopsy needle malfunction and lack of tissue obtained,  a clip was not placed at the time of biopsy, path:    Breast, right, site not further specified, core needle biopsy:  Infiltrating ductal carcinoma, moderately differentiated. Ductal carcinoma in situ, comedo type, high nuclear grade. Comment:     Intradepartmental consultation is obtained.  Infiltrating  ductal carcinoma corresponds to architectural grade 3, nuclear grade 2,  mitotic grade 1; overall grade 6/9-moderately differentiated; grade 2. Breast Cancer Marker Studies:  Estrogen Receptors (ER): Positive (80% nuclei staining). Staining intensity = strong. Progesterone Receptors (MO): Positive (5% nuclei staining). Staining intensity = weak. Her-2/dougie (c-erb B-2) protein expression:  Positive (3+). She had bilateral Breast MRI done on 4/4/2019 revealing: There is scattered fibroglandular tissue with minimal background  parenchymal enhancement. An irregular, enhancing mass is noted in the  right breast 10:00 which measures 9 x 6 x 7 mm (image 43 of the axial  T1 postcontrast series). No suspicious mass or non-mass enhancement  seen on the left. There is no lymphadenopathy. Bilateral skin and  nipple areolar complexes are normal. Visualized portions of the chest  and abdomen are unremarkable. Impression  1. therapy. Pathologic stage: pT1a-0.2 cm maximum dimension. Regional lymph nodes: (sn) pN0-no regional lymph node metastasis. Distant metastasis: pMX-cannot be assessed. Ancillary studies: Performed on the patient's previous biopsy specimen  ([facility]-; 3/29/2019). Microcalcifications: Present in DCIS. Additional pathologic findings: Fibrocystic change.  Previous  biopsy/excision site. The patient has a small tumor, HER-2/dougie and hormone receptor positive, given her young age and excellent health, she was not comfortable with adjuvant treatment with only hormonal therapy. The patient was started on adjuvant chemotherapy with Taxol and Herceptin on 5/28/2019, is currently on single agent Herceptin, tolerating it well, she completed adjuvant RT on 10/10/2019. The patient was started on adjuvant endocrine therapy with Arimidex on 10/20/2019, she is tolerating it well overall, she has pain in the lower extremities, which she believes is due to exercising. Review of Systems;  CONSTITUTIONAL: No fever, chills. Good appetite and energy level. ENMT: Eyes: No diplopia; Nose: No epistaxis. Mouth: No sore throat. RESPIRATORY: No hemoptysis, shortness of breath, cough. CARDIOVASCULAR: No chest pain, palpitations. GASTROINTESTINAL: No nausea/vomiting, abdominal pain, diarrhea/constipation. GENITOURINARY: No dysuria, urinary frequency, hematuria. NEURO: No syncope, presyncope, headache. MSK: she has chronic pain in the back and right hip.   Remainder:  ROS NEGATIVE    Past Medical History:      Diagnosis Date    Cancer [facility])     malignant lesion in Right breast - for OR 4-18-19     Hyperlipidemia     no medications     Post-operative state 5/30/2019     Patient Active Problem List   Diagnosis    Infiltrating ductal carcinoma of right breast, stage 1 ([address]        Past Surgical History:      Procedure Laterality Date    BREAST BIOPSY      BREAST BIOPSY Right 4/18/2019    RIGHT PARTIAL MASTECTOMY WITH NEEDLE LOCALIZATION AND SENTINEL NODE performed by Rosa Marquez MD at [facility] 13 / REMOVAL / REPLACEMENT VENOUS ACCESS CATHETER N/A 5/17/2019    PORT INSERTION performed by Elza Ruggiero MD at [facility]       Family History:  Family History   Problem Relation Age of Onset    Breast Cancer Mother     Cancer Father         esophageal, stomach    Cancer Paternal Grandfather         colon       Medications:  Reviewed and reconciled. Social History:  Social History     Socioeconomic History    Marital status:      Spouse name: Ute Wharton Number of children: 4    Years of education: 18     Highest education level: Bachelor's degree (e.g., BA, AB, BS)   Occupational History    Occupation: lab technican     Employer: [employer] Financial resource strain: Not hard at all    Food insecurity:     Worry: Never true     Inability: Never true   [employer] needs:     Medical: No     Non-medical: No   Tobacco Use    Smoking status: Never Smoker    Smokeless tobacco: Never Used   Substance and Sexual Activity    Alcohol use: Not Currently    Drug use: Never    Sexual activity: Yes     Partners: Male   Lifestyle    Physical activity:     Days per week: 4 days     Minutes per session: 40 min    Stress:  To some extent   Relationships    Social connections:     Talks on phone: More than three times a week     Gets together: More than three times a week     Attends Hinduism service: 1 to 4 times per year     Active member of club or organization: No     Attends meetings of clubs or organizations: Never     Relationship status:     Intimate partner violence:     Fear of current or ex partner: No     Emotionally abused: No     Physically abused: No     Forced sexual activity: No   Other Topics Concern    Not on file   Social History Narrative    Not on file       Allergies:  No Known Allergies     OB/GYN:  The patient is , she had her menarche at the age of 11-12, she had a hysterectomy, no oophorectomies, 1st live birth was at 20, she used OCPs for about 10 years. Physical Exam:  BP (!) 105/51 (Site: Left Upper Arm, Position: Sitting, Cuff Size: Small Adult)   Pulse 62   Temp 98.6 °F (37 °C) (Temporal)   Ht 5' 3\" (1.6 m)   Wt 135 lb 4.8 oz (61.4 kg)   SpO2 97%   BMI 23.97 kg/m²   GENERAL: Alert, oriented x 3, not in acute distress. HEENT: PERRLA; EOMI. Oropharynx clear. NECK: Supple. No palpable cervical or supraclavicular lymphadenopathy. LUNGS: Good air entry bilaterally. No wheezing, crackles or rhonchi. BREASTS: Radiation changes to the upper chest wall and right breast.  CHEST: right sided port. CARDIOVASCULAR: Regular rate. No murmurs, rubs or gallops. ABDOMEN: Soft. Non-tender, non-distended. Positive bowel sounds. EXTREMITIES: Without clubbing, cyanosis, or edema. NEUROLOGIC: No focal deficits. ECOG PS 0    Impression/Plan:      The patient is a 53 y.o. postmenopausal lady with a PMH significant for hyperlipidemia, who had presented with an abnormal screening Mammogram, she was diagnosed with a right breast invasive ductal carcinoma, grade 2, measuring 9 mm, ER positive, 80% NM +5% HER-2/dougie positive, 3+ by IHC. Patient with clinical stage [id number] disease, she underwent on 2019 a right breast needle localized lumpectomy, with SLN excisional biopsy, tumor size is 2mm, grade 2, with associated DCIS, measuring 7mm, high grade, comedo type with central necrosis, margins are negative, 4 SN were removed, they were all neg for metastatic disease, final pathologic stage pT1a(sn)pN0M0, prognostic stage IA disease. I discussed with the patient and her spouse pathology results, stage and prognosis, adjuvant RT and adjuvant ET with an AI are recommended.  The patient has a small Her2 pos disease (<5mm), I reviewed with her the nccn guidelines and the risks of the

## 2020-01-07 NOTE — PROGRESS NOTES
Pt presents for anti cancer therapy. Tolerated treatment well without complications. Discharged ambulatory in stable condition.

## 2020-01-28 ENCOUNTER — OFFICE VISIT (OUTPATIENT)
Dept: ONCOLOGY | Age: 55
End: 2020-01-28
Payer: COMMERCIAL

## 2020-01-28 ENCOUNTER — HOSPITAL ENCOUNTER (OUTPATIENT)
Dept: INFUSION THERAPY | Age: 55
Discharge: HOME | End: 2020-01-28
Payer: COMMERCIAL

## 2020-01-28 VITALS
TEMPERATURE: 98.5 F | WEIGHT: 131.5 LBS | HEIGHT: 63 IN | BODY MASS INDEX: 23.3 KG/M2 | DIASTOLIC BLOOD PRESSURE: 75 MMHG | HEART RATE: 82 BPM | SYSTOLIC BLOOD PRESSURE: 113 MMHG | RESPIRATION RATE: 20 BRPM

## 2020-01-28 VITALS
HEART RATE: 72 BPM | RESPIRATION RATE: 16 BRPM | SYSTOLIC BLOOD PRESSURE: 115 MMHG | DIASTOLIC BLOOD PRESSURE: 55 MMHG | TEMPERATURE: 98.7 F

## 2020-01-28 DIAGNOSIS — C50.911 INFILTRATING DUCTAL CARCINOMA OF RIGHT BREAST, STAGE 1 (HCC): Primary | ICD-10-CM

## 2020-01-28 PROCEDURE — 96413 CHEMO IV INFUSION 1 HR: CPT

## 2020-01-28 PROCEDURE — 2580000003 HC RX 258: Performed by: INTERNAL MEDICINE

## 2020-01-28 PROCEDURE — 6360000002 HC RX W HCPCS: Performed by: INTERNAL MEDICINE

## 2020-01-28 PROCEDURE — 99214 OFFICE O/P EST MOD 30 MIN: CPT | Performed by: INTERNAL MEDICINE

## 2020-01-28 RX ORDER — SODIUM CHLORIDE 9 MG/ML
INJECTION, SOLUTION INTRAVENOUS CONTINUOUS
Status: CANCELLED | OUTPATIENT
Start: 2020-01-28

## 2020-01-28 RX ORDER — HEPARIN SODIUM (PORCINE) LOCK FLUSH IV SOLN 100 UNIT/ML 100 UNIT/ML
500 SOLUTION INTRAVENOUS PRN
Status: CANCELLED | OUTPATIENT
Start: 2020-01-28

## 2020-01-28 RX ORDER — CLARITHROMYCIN 500 MG/1
500 TABLET, COATED ORAL 2 TIMES DAILY
COMMUNITY
End: 2020-06-22 | Stop reason: ALTCHOICE

## 2020-01-28 RX ORDER — SODIUM CHLORIDE 0.9 % (FLUSH) 0.9 %
5 SYRINGE (ML) INJECTION PRN
Status: CANCELLED | OUTPATIENT
Start: 2020-01-28

## 2020-01-28 RX ORDER — 0.9 % SODIUM CHLORIDE 0.9 %
10 VIAL (ML) INJECTION ONCE
Status: CANCELLED | OUTPATIENT
Start: 2020-01-28

## 2020-01-28 RX ORDER — SODIUM CHLORIDE 0.9 % (FLUSH) 0.9 %
10 SYRINGE (ML) INJECTION PRN
Status: CANCELLED | OUTPATIENT
Start: 2020-01-28

## 2020-01-28 RX ORDER — EPINEPHRINE 1 MG/ML
0.3 INJECTION, SOLUTION, CONCENTRATE INTRAVENOUS PRN
Status: CANCELLED | OUTPATIENT
Start: 2020-01-28

## 2020-01-28 RX ORDER — HEPARIN SODIUM (PORCINE) LOCK FLUSH IV SOLN 100 UNIT/ML 100 UNIT/ML
500 SOLUTION INTRAVENOUS PRN
Status: DISCONTINUED | OUTPATIENT
Start: 2020-01-28 | End: 2020-01-29 | Stop reason: HOSPADM

## 2020-01-28 RX ORDER — MEPERIDINE HYDROCHLORIDE 25 MG/ML
12.5 INJECTION INTRAMUSCULAR; INTRAVENOUS; SUBCUTANEOUS ONCE
Status: CANCELLED | OUTPATIENT
Start: 2020-01-28

## 2020-01-28 RX ORDER — DIPHENHYDRAMINE HYDROCHLORIDE 50 MG/ML
50 INJECTION INTRAMUSCULAR; INTRAVENOUS ONCE
Status: CANCELLED | OUTPATIENT
Start: 2020-01-28

## 2020-01-28 RX ORDER — SODIUM CHLORIDE 0.9 % (FLUSH) 0.9 %
10 SYRINGE (ML) INJECTION PRN
Status: DISCONTINUED | OUTPATIENT
Start: 2020-01-28 | End: 2020-01-29 | Stop reason: HOSPADM

## 2020-01-28 RX ORDER — SODIUM CHLORIDE 9 MG/ML
20 INJECTION, SOLUTION INTRAVENOUS ONCE
Status: DISCONTINUED | OUTPATIENT
Start: 2020-01-28 | End: 2020-01-29 | Stop reason: HOSPADM

## 2020-01-28 RX ORDER — METHYLPREDNISOLONE SODIUM SUCCINATE 125 MG/2ML
125 INJECTION, POWDER, LYOPHILIZED, FOR SOLUTION INTRAMUSCULAR; INTRAVENOUS ONCE
Status: CANCELLED | OUTPATIENT
Start: 2020-01-28

## 2020-01-28 RX ORDER — SODIUM CHLORIDE 9 MG/ML
20 INJECTION, SOLUTION INTRAVENOUS ONCE
Status: CANCELLED | OUTPATIENT
Start: 2020-01-28

## 2020-01-28 RX ADMIN — SODIUM CHLORIDE 20 ML/HR: 9 INJECTION, SOLUTION INTRAVENOUS at 08:40

## 2020-01-28 RX ADMIN — TRASTUZUMAB 357 MG: 150 INJECTION, POWDER, LYOPHILIZED, FOR SOLUTION INTRAVENOUS at 08:39

## 2020-01-28 RX ADMIN — HEPARIN 500 UNITS: 100 SYRINGE at 09:21

## 2020-01-28 RX ADMIN — SODIUM CHLORIDE, PRESERVATIVE FREE 10 ML: 5 INJECTION INTRAVENOUS at 08:30

## 2020-01-28 RX ADMIN — SODIUM CHLORIDE, PRESERVATIVE FREE 10 ML: 5 INJECTION INTRAVENOUS at 09:20

## 2020-01-28 NOTE — PROGRESS NOTES
Irregular, enhancing mass in the right breast 10:00 is consistent  with biopsy-proven neoplasm. No additional foci of disease identified  in the right breast.  2. There is no lymphadenopathy. 3. No evidence of neoplasm on the left. RECOMMENDATION:  Continued surgical and oncologic consultation is advised. BIRADS 6: Known neoplasm    The patient underwent on 4/18/2019 a right breast needle localized lumpectomy, with SLN excisional biopsy, path:  CANCER CASE SUMMARY  Procedure: Excision (less than total mastectomy; needle localization  partial mastectomy [lumpectomy]). Specimen laterality: Right. Tumor site: Not specified. Tumor size: 0.2 cm (invasive component). Histologic type: Invasive ductal carcinoma. Histologic grade: Glandular/tubular differentiation-score = 3; nuclear  pleomorphism-score = 2; mitotic rate-score = 1.  Overall grade 2;  6/9-moderately differentiated. Tumor focality: Single focus of invasive carcinoma. Ductal carcinoma in situ: Present. Size/extent of DCIS: 0.7 cm.  DCIS completely surrounds a small, residual  focus of infiltrating ductal carcinoma in the current specimen. Architectural pattern: Comedo type. Nuclear grade: Grade III (high nuclear grade). Necrosis: Present, Central (expansive \"comedo\" necrosis). Microcalcifications: Present. Margins: Invasive carcinoma margins-uninvolved by invasive carcinoma. Distance from closest margin-0.9 cm (inferior inked margin). DCIS margins-uninvolved by DCIS.  Distance from closest margin-0.3 cm  (anterior inked margin of resection-specimen A). Note: Specimen C  represents additional anterior/superior breast tissue [3.0 x 2.5 x 1.0  cm] which represents larger final margin of resection for DCIS with  regard to anterior margin of resection). Regional lymph nodes: Uninvolved by tumor cells.  Number of lymph nodes  examined-4 (designated sentinel lymph nodes).  Number of lymph nodes  involved-0.   Treatment effect: No known presurgical Right 4/18/2019    RIGHT PARTIAL MASTECTOMY WITH NEEDLE LOCALIZATION AND SENTINEL NODE performed by Jodee Nixon MD at [facility] 13 / REMOVAL / REPLACEMENT VENOUS ACCESS CATHETER N/A 5/17/2019    PORT INSERTION performed by Lea Carty MD at [facility]       Family History:  Family History   Problem Relation Age of Onset    Breast Cancer Mother     Cancer Father         esophageal, stomach    Cancer Paternal Grandfather         colon       Medications:  Reviewed and reconciled. Social History:  Social History     Socioeconomic History    Marital status:      Spouse name: Adeola Medeiros Number of children: 4    Years of education: 18     Highest education level: Bachelor's degree (e.g., BA, AB, BS)   Occupational History    Occupation: lab technican     Employer: [address] [place] [address] Financial resource strain: Not hard at all    Food insecurity:     Worry: Never true     Inability: Never true   zappit needs:     Medical: No     Non-medical: No   Tobacco Use    Smoking status: Never Smoker    Smokeless tobacco: Never Used   Substance and Sexual Activity    Alcohol use: Not Currently    Drug use: Never    Sexual activity: Yes     Partners: Male   Lifestyle    Physical activity:     Days per week: 4 days     Minutes per session: 40 min    Stress:  To some extent   Relationships    Social connections:     Talks on phone: More than three times a week     Gets together: More than three times a week     Attends [employer] service: 1 to 4 times per year     Active member of club or organization: No     Attends meetings of clubs or organizations: Never     Relationship status:     Intimate partner violence:     Fear of current or ex partner: No     Emotionally abused: No     Physically abused: No     Forced sexual activity: No   Other Topics Concern    Not on file   Social History Narrative    Not on file       Allergies:  No Known Allergies     OB/GYN:  The patient is , she had her menarche at the age of 11-12, she had a hysterectomy, no oophorectomies, 1st live birth was at 20, she used OCPs for about 10 years. Physical Exam:  /75   Pulse 82   Temp 98.5 °F (36.9 °C)   Resp 20   Ht 5' 3\" (1.6 m)   Wt 131 lb 8 oz (59.6 kg)   BMI 23.29 kg/m²   GENERAL: Alert, oriented x 3, not in acute distress. HEENT: PERRLA; EOMI. Oropharynx clear. NECK: Supple. No palpable cervical or supraclavicular lymphadenopathy. LUNGS: Good air entry bilaterally. No wheezing, crackles or rhonchi. BREASTS: Radiation changes to the upper chest wall and right breast.  CHEST: right sided port. CARDIOVASCULAR: Regular rate. No murmurs, rubs or gallops. ABDOMEN: Soft. Non-tender, non-distended. Positive bowel sounds. EXTREMITIES: Without clubbing, cyanosis, or edema. NEUROLOGIC: No focal deficits. ECOG PS 0    Impression/Plan:      The patient is a 53 y.o. postmenopausal lady with a PMH significant for hyperlipidemia, who had presented with an abnormal screening Mammogram, she was diagnosed with a right breast invasive ductal carcinoma, grade 2, measuring 9 mm, ER positive, 80% OH +5% HER-2/dougie positive, 3+ by IHC. Patient with clinical stage [id number] disease, she underwent on 2019 a right breast needle localized lumpectomy, with SLN excisional biopsy, tumor size is 2mm, grade 2, with associated DCIS, measuring 7mm, high grade, comedo type with central necrosis, margins are negative, 4 SN were removed, they were all neg for metastatic disease, final pathologic stage pT1a(sn)pN0M0, prognostic stage IA disease. I discussed with the patient and her spouse pathology results, stage and prognosis, adjuvant RT and adjuvant ET with an AI are recommended.  The patient has a small Her2 pos disease (<5mm), I reviewed with her the nccn guidelines and the risks of the  Chemotherapy, she is young and respiratory tract infection, continue antibiotics. Continue Arimidex and Ca/VitD. RTC in 3 weeks for labs and treatment     Thank you for allowing us to participate in the care of Mrs. Quintanilla.     Rolly Luciano MD   HEMATOLOGY/MEDICAL ONCOLOGY  [address] [place] [address] ONCOLOGY  [facility] [address] [facility] [phone]  Dept: [phone]

## 2020-01-31 ENCOUNTER — TELEPHONE (OUTPATIENT)
Dept: CASE MANAGEMENT | Age: 55
End: 2020-01-31

## 2020-01-31 NOTE — TELEPHONE ENCOUNTER
Received FMLA paperwork from patient's spouse's employer, Paperwork completed and set for providers signature, upon signing paper work will be faxed to patient's employer.

## 2020-02-12 ENCOUNTER — HOSPITAL ENCOUNTER (OUTPATIENT)
Dept: RADIATION ONCOLOGY | Age: 55
Discharge: HOME | End: 2020-02-12
Attending: RADIOLOGY
Payer: COMMERCIAL

## 2020-02-12 VITALS
BODY MASS INDEX: 23.03 KG/M2 | RESPIRATION RATE: 16 BRPM | TEMPERATURE: 97.8 F | HEART RATE: 55 BPM | WEIGHT: 130 LBS | SYSTOLIC BLOOD PRESSURE: 112 MMHG | DIASTOLIC BLOOD PRESSURE: 60 MMHG | OXYGEN SATURATION: 97 %

## 2020-02-12 PROCEDURE — 99212 OFFICE O/P EST SF 10 MIN: CPT

## 2020-02-12 PROCEDURE — 99212 OFFICE O/P EST SF 10 MIN: CPT | Performed by: RADIOLOGY

## 2020-02-12 NOTE — PROGRESS NOTES
Samantha Fischer  2/12/2020  8:33 AM      Vitals:    02/12/20 0832   BP: 112/60   Pulse: 55   Resp: 16   Temp: 97.8 °F (36.6 °C)   SpO2: 97%    : Wt Readings from Last 3 Encounters:   02/12/20 130 lb (59 kg)   01/28/20 131 lb 8 oz (59.6 kg)   01/07/20 135 lb 4.8 oz (61.4 kg)                Current Outpatient Medications:     clarithromycin (BIAXIN) 500 MG tablet, Take 500 mg by mouth 2 times daily, Disp: , Rfl:     anastrozole (ARIMIDEX) 1 MG tablet, Take 1 tablet by mouth daily, Disp: 90 tablet, Rfl: 2    Calcium Carbonate-Vitamin D (OYSTER SHELL CALCIUM/D) 500-200 MG-UNIT TABS, Take 1 tablet by mouth daily (Patient not taking: Reported on 1/28/2020), Disp: 30 tablet, Rfl: 3    prochlorperazine (COMPAZINE) 10 MG tablet, Take 1 tablet by mouth every 6 hours as needed (nausea) (Patient not taking: Reported on 1/28/2020), Disp: 120 tablet, Rfl: 1    ondansetron (ZOFRAN) 4 MG tablet, Take 1 tablet by mouth every 8 hours as needed for Nausea or Vomiting (Patient not taking: Reported on 1/28/2020), Disp: 90 tablet, Rfl: 1      Patient is seen today in follow up for R breast cancer    On 10/10/2019, Samantha Fischer completed 4256 cGy in 16 fractions directed to the right breast    She presents today without complaints with the exception of tenderness that she attributes to an underwire bra. She is maintained on Arimidex with Dr Chano Sandoval and sees her this week for herceptin as well. This is scheduled to continue through June. FALLS RISK SCREENING ASSESSMENT    Instructions:  Assess the patient and enter the appropriate indicators that are present for fall risk identification. Total the numbers entered and assign a fall risk score from Table 2.  Reassess patient at a minimum every 12 weeks or with status change. Assessment   Date  2/12/2020     1. Mental Ability: confusion/cognitively impaired No - 0       2. Elimination Issues: incontinence, frequency No - 0       3.   Ambulatory: use of assistive devices patient chart           MALNUTRITION RISK SCREENING ASSESSMENT    2/12/2020   Patient:  Sharyle Reno  Sex:  female    Instructions:  Assess the patient and enter the appropriate indicators that are present for nutrition risk identification. Total the numbers entered and assign a risk score. Follow the appropriate action for total score listed below. Assessment   Date  2/12/2020     1. Have you lost weight without trying? 0- No     2. Have you been eating poorly because of a decreased appetite? 0- No   3. Do you have a diagnosis of head and neck cancer?       0- No                                                                                    TOTAL 0          Score of 0-1: No action  Score 2 or greater:  · For Non-Diabetic Patient: Recommend adding Ensure Complete 2 x daily and provide patient with Ensure wellness bag with coupons  · For Diabetic Patient: Recommend adding Glucerna Shake 2 x daily and provide patient with Glucerna Wellness bag with coupons  · Route to the dietitian via [address] [place] [address]

## 2020-02-12 NOTE — PROGRESS NOTES
focally. Left breast: Normal to palpation    Lungs: Clear bilaterally    CVS: S1-S2 regular    Abdomen: Soft nontender no organomegaly. Labs:  Lab Results   Component Value Date    WBC 5.7 07/15/2019    RBC 3.39 07/15/2019    HGB 11.1 07/15/2019    HCT 34.9 07/15/2019    .9 07/15/2019    MCH 32.7 07/15/2019    MCHC 31.8 07/15/2019    RDW 15.3 07/15/2019     07/15/2019    MPV 10.1 07/15/2019         ASSESSMENT/PLAN:  Ha Torres is doing well overall. Reassured about the right axillary discomfort. She is seeing her gynecologist today. Recommend bilateral mammogram.    Next FU in December and after that as necessary. Expect a good prognosis    I asked Ha Torres  to contact us at any time for any questions or concerns. Thank you for allowing us to participate in your patient management and care. Szymanski M.D.   Radiation Oncologist  [facility]:  [address]:  [phone]

## 2020-02-18 ENCOUNTER — HOSPITAL ENCOUNTER (OUTPATIENT)
Dept: INFUSION THERAPY | Age: 55
Discharge: HOME | End: 2020-02-18
Payer: COMMERCIAL

## 2020-02-18 ENCOUNTER — OFFICE VISIT (OUTPATIENT)
Dept: ONCOLOGY | Age: 55
End: 2020-02-18
Payer: COMMERCIAL

## 2020-02-18 VITALS
SYSTOLIC BLOOD PRESSURE: 119 MMHG | HEIGHT: 63 IN | WEIGHT: 134 LBS | HEART RATE: 65 BPM | TEMPERATURE: 98.7 F | DIASTOLIC BLOOD PRESSURE: 55 MMHG | OXYGEN SATURATION: 98 % | BODY MASS INDEX: 23.74 KG/M2

## 2020-02-18 DIAGNOSIS — C50.911 INFILTRATING DUCTAL CARCINOMA OF RIGHT BREAST, STAGE 1 (HCC): Primary | ICD-10-CM

## 2020-02-18 PROCEDURE — 6360000002 HC RX W HCPCS: Performed by: INTERNAL MEDICINE

## 2020-02-18 PROCEDURE — 2580000003 HC RX 258: Performed by: INTERNAL MEDICINE

## 2020-02-18 PROCEDURE — 96413 CHEMO IV INFUSION 1 HR: CPT

## 2020-02-18 PROCEDURE — 2580000003 HC RX 258

## 2020-02-18 PROCEDURE — 99214 OFFICE O/P EST MOD 30 MIN: CPT | Performed by: INTERNAL MEDICINE

## 2020-02-18 RX ORDER — MEPERIDINE HYDROCHLORIDE 25 MG/ML
12.5 INJECTION INTRAMUSCULAR; INTRAVENOUS; SUBCUTANEOUS ONCE
Status: CANCELLED | OUTPATIENT
Start: 2020-02-18

## 2020-02-18 RX ORDER — SODIUM CHLORIDE 9 MG/ML
INJECTION, SOLUTION INTRAVENOUS
Status: COMPLETED
Start: 2020-02-18 | End: 2020-02-19

## 2020-02-18 RX ORDER — DIPHENHYDRAMINE HYDROCHLORIDE 50 MG/ML
50 INJECTION INTRAMUSCULAR; INTRAVENOUS ONCE
Status: CANCELLED | OUTPATIENT
Start: 2020-02-18

## 2020-02-18 RX ORDER — SODIUM CHLORIDE 9 MG/ML
20 INJECTION, SOLUTION INTRAVENOUS ONCE
Status: CANCELLED | OUTPATIENT
Start: 2020-02-18

## 2020-02-18 RX ORDER — METHYLPREDNISOLONE SODIUM SUCCINATE 125 MG/2ML
125 INJECTION, POWDER, LYOPHILIZED, FOR SOLUTION INTRAMUSCULAR; INTRAVENOUS ONCE
Status: CANCELLED | OUTPATIENT
Start: 2020-02-18

## 2020-02-18 RX ORDER — SODIUM CHLORIDE 0.9 % (FLUSH) 0.9 %
5 SYRINGE (ML) INJECTION PRN
Status: CANCELLED | OUTPATIENT
Start: 2020-02-18

## 2020-02-18 RX ORDER — SODIUM CHLORIDE 9 MG/ML
INJECTION, SOLUTION INTRAVENOUS CONTINUOUS
Status: CANCELLED | OUTPATIENT
Start: 2020-02-18

## 2020-02-18 RX ORDER — 0.9 % SODIUM CHLORIDE 0.9 %
10 VIAL (ML) INJECTION ONCE
Status: CANCELLED | OUTPATIENT
Start: 2020-02-18

## 2020-02-18 RX ORDER — SODIUM CHLORIDE 0.9 % (FLUSH) 0.9 %
10 SYRINGE (ML) INJECTION PRN
Status: DISCONTINUED | OUTPATIENT
Start: 2020-02-18 | End: 2020-02-19 | Stop reason: HOSPADM

## 2020-02-18 RX ORDER — HEPARIN SODIUM (PORCINE) LOCK FLUSH IV SOLN 100 UNIT/ML 100 UNIT/ML
500 SOLUTION INTRAVENOUS PRN
Status: DISCONTINUED | OUTPATIENT
Start: 2020-02-18 | End: 2020-02-19 | Stop reason: HOSPADM

## 2020-02-18 RX ORDER — EPINEPHRINE 1 MG/ML
0.3 INJECTION, SOLUTION, CONCENTRATE INTRAVENOUS PRN
Status: CANCELLED | OUTPATIENT
Start: 2020-02-18

## 2020-02-18 RX ORDER — SODIUM CHLORIDE 0.9 % (FLUSH) 0.9 %
10 SYRINGE (ML) INJECTION PRN
Status: CANCELLED | OUTPATIENT
Start: 2020-02-18

## 2020-02-18 RX ORDER — SODIUM CHLORIDE 9 MG/ML
20 INJECTION, SOLUTION INTRAVENOUS ONCE
Status: COMPLETED | OUTPATIENT
Start: 2020-02-18 | End: 2020-02-19

## 2020-02-18 RX ORDER — HEPARIN SODIUM (PORCINE) LOCK FLUSH IV SOLN 100 UNIT/ML 100 UNIT/ML
500 SOLUTION INTRAVENOUS PRN
Status: CANCELLED | OUTPATIENT
Start: 2020-02-18

## 2020-02-18 RX ADMIN — SODIUM CHLORIDE 250 ML: 9 INJECTION, SOLUTION INTRAVENOUS at 08:30

## 2020-02-18 RX ADMIN — TRASTUZUMAB 357 MG: 150 INJECTION, POWDER, LYOPHILIZED, FOR SOLUTION INTRAVENOUS at 08:38

## 2020-02-18 RX ADMIN — HEPARIN 500 UNITS: 100 SYRINGE at 09:16

## 2020-02-18 RX ADMIN — SODIUM CHLORIDE, PRESERVATIVE FREE 10 ML: 5 INJECTION INTRAVENOUS at 08:30

## 2020-02-18 RX ADMIN — SODIUM CHLORIDE, PRESERVATIVE FREE 10 ML: 5 INJECTION INTRAVENOUS at 09:16

## 2020-02-18 NOTE — PROGRESS NOTES
[facility]  [address] [place] [id number]  Dept: [phone]  Attending Progress Note      Reason for Visit:   Right Breast Cancer. Referring Physician:  Shahida Spence MD    PCP:  Debbie Mcneill MD    History of Present Illness: The patient is a 53 y.o. lady with a PMH significant for hyperlipidemia, who had presented with an abnormal screening Mammogram, was done on 3/12/2019, revealing calcifications in the right UOQ, she had a right breast US done on 3/13/73133, no masses were seen, she underwent on 3/29/2019 a stereotactic biopsy of the right breast calcifications. Due to biopsy needle malfunction and lack of tissue obtained,  a clip was not placed at the time of biopsy, path:    Breast, right, site not further specified, core needle biopsy:  Infiltrating ductal carcinoma, moderately differentiated. Ductal carcinoma in situ, comedo type, high nuclear grade. Comment:     Intradepartmental consultation is obtained.  Infiltrating  ductal carcinoma corresponds to architectural grade 3, nuclear grade 2,  mitotic grade 1; overall grade 6/9-moderately differentiated; grade 2. Breast Cancer Marker Studies:  Estrogen Receptors (ER): Positive (80% nuclei staining). Staining intensity = strong. Progesterone Receptors (SC): Positive (5% nuclei staining). Staining intensity = weak. Her-2/dougie (c-erb B-2) protein expression:  Positive (3+). She had bilateral Breast MRI done on 4/4/2019 revealing: There is scattered fibroglandular tissue with minimal background  parenchymal enhancement. An irregular, enhancing mass is noted in the  right breast 10:00 which measures 9 x 6 x 7 mm (image 43 of the axial  T1 postcontrast series). No suspicious mass or non-mass enhancement  seen on the left. There is no lymphadenopathy. Bilateral skin and  nipple areolar complexes are normal. Visualized portions of the chest  and abdomen are unremarkable. Impression  1. therapy. Pathologic stage: pT1a-0.2 cm maximum dimension. Regional lymph nodes: (sn) pN0-no regional lymph node metastasis. Distant metastasis: pMX-cannot be assessed. Ancillary studies: Performed on the patient's previous biopsy specimen  (ASC-; 3/29/2019). Microcalcifications: Present in DCIS. Additional pathologic findings: Fibrocystic change.  Previous  biopsy/excision site. The patient has a small tumor, HER-2/dougie and hormone receptor positive, given her young age and excellent health, she was not comfortable with adjuvant treatment with only hormonal therapy. The patient was started on adjuvant chemotherapy with Taxol and Herceptin on 5/28/2019, is currently on single agent Herceptin, tolerating it well, she completed adjuvant RT on 10/10/2019. The patient was started on adjuvant endocrine therapy with Arimidex on 10/20/2019, she is tolerating it well overall. The patient was diagnosed with influenza A after her last visit, she is feeling much better, still has pain in the legs and the arms, mood changes. Review of Systems;  CONSTITUTIONAL: No fever, chills. Good appetite and energy level. ENMT: Eyes: No diplopia; Nose: No epistaxis. Mouth: No sore throat. RESPIRATORY: No hemoptysis, shortness of breath, cough. CARDIOVASCULAR: No chest pain, palpitations. GASTROINTESTINAL: No nausea/vomiting, abdominal pain, diarrhea/constipation. GENITOURINARY: No dysuria, urinary frequency, hematuria. NEURO: No syncope, presyncope, headache. MSK: she has chronic pain in the back and right hip.   Remainder:  ROS NEGATIVE    Past Medical History:      Diagnosis Date    Cancer [facility])     malignant lesion in Right breast - for OR 4-18-19     Hyperlipidemia     no medications     Post-operative state 5/30/2019     Patient Active Problem List   Diagnosis    Infiltrating ductal carcinoma of right breast, stage 1 ([facility] [address]        Past Surgical History:      Procedure Laterality Date    BREAST BIOPSY  BREAST BIOPSY Right 4/18/2019    RIGHT PARTIAL MASTECTOMY WITH NEEDLE LOCALIZATION AND SENTINEL NODE performed by Dionisio Lombardo MD at [facility] 13 / REMOVAL / REPLACEMENT VENOUS ACCESS CATHETER N/A 5/17/2019    PORT INSERTION performed by Norma Favre, MD at [facility]       Family History:  Family History   Problem Relation Age of Onset    Breast Cancer Mother     Cancer Father         esophageal, stomach    Cancer Paternal Grandfather         colon       Medications:  Reviewed and reconciled. Social History:  Social History     Socioeconomic History    Marital status:      Spouse name: Itzel Meneses Number of children: 4    Years of education: 18     Highest education level: Bachelor's degree (e.g., BA, AB, BS)   Occupational History    Occupation: lab technican     Employer: [employer] Financial resource strain: Not hard at all    Food insecurity:     Worry: Never true     Inability: Never true   [employer] needs:     Medical: No     Non-medical: No   Tobacco Use    Smoking status: Never Smoker    Smokeless tobacco: Never Used   Substance and Sexual Activity    Alcohol use: Not Currently    Drug use: Never    Sexual activity: Yes     Partners: Male   Lifestyle    Physical activity:     Days per week: 4 days     Minutes per session: 40 min    Stress:  To some extent   Relationships    Social connections:     Talks on phone: More than three times a week     Gets together: More than three times a week     Attends Faith service: 1 to 4 times per year     Active member of club or organization: No     Attends meetings of clubs or organizations: Never     Relationship status:     Intimate partner violence:     Fear of current or ex partner: No     Emotionally abused: No     Physically abused: No     Forced sexual activity: No   Other Topics Concern    Not on file Social History Narrative    Not on file       Allergies:  No Known Allergies     OB/GYN:  The patient is , she had her menarche at the age of 11-12, she had a hysterectomy, no oophorectomies, 1st live birth was at 20, she used OCPs for about 10 years. Physical Exam:  There were no vitals taken for this visit. GENERAL: Alert, oriented x 3, not in acute distress. HEENT: PERRLA; EOMI. Oropharynx clear. NECK: Supple. No palpable cervical or supraclavicular lymphadenopathy. LUNGS: Good air entry bilaterally. No wheezing, crackles or rhonchi. BREASTS: Radiation changes to the upper chest wall and right breast.  CHEST: right sided port. CARDIOVASCULAR: Regular rate. No murmurs, rubs or gallops. ABDOMEN: Soft. Non-tender, non-distended. Positive bowel sounds. EXTREMITIES: Without clubbing, cyanosis, or edema. NEUROLOGIC: No focal deficits. ECOG PS 0    Impression/Plan:      The patient is a 53 y.o. postmenopausal lady with a PMH significant for hyperlipidemia, who had presented with an abnormal screening Mammogram, she was diagnosed with a right breast invasive ductal carcinoma, grade 2, measuring 9 mm, ER positive, 80% [place] +5% HER-2/dougie positive, 3+ by IHC. Patient with clinical stage [id number] disease, she underwent on 2019 a right breast needle localized lumpectomy, with SLN excisional biopsy, tumor size is 2mm, grade 2, with associated DCIS, measuring 7mm, high grade, comedo type with central necrosis, margins are negative, 4 SN were removed, they were all neg for metastatic disease, final pathologic stage pT1a(sn)pN0M0, prognostic stage IA disease. I discussed with the patient and her spouse pathology results, stage and prognosis, adjuvant RT and adjuvant ET with an AI are recommended.  The patient has a small Her2 pos disease (<5mm), I reviewed with her the nccn guidelines and the risks of the  Chemotherapy, she is young and healthy, she is very nervous about the diagnosis of breast cancer, she is very well informed about her disease, she prefers to be treated, JAGDISH SHAFER was originally planned, but given the size of the tumor (<5mm), decided to treat with Taxol (weekly for 12 weeks) with herceptin, the side effects of the treatment including were discussed with the patient, 2D echo has been done LVEF is adequate for treatment with Herceptin. Patient with a personal history of breast cancer, family history is positive for breast and ovarian cancer, testing for HBOC is recommended, MyRisk testing was done, neg for clinically significant mutations. She was started adjuvant chemotherapy with Taxol and Herceptin on 5/28/2019. Completed Taxol on 8/20/2019. Labs reviewed, proceed with single agent Herceptin today 9/10/2019. Mild macrocytosis, ordered by B-12 and folate, B12 is borderline, ordered MMA, it is not elevated, antiparietal cells and intrinsic factor antibodies done to evaluate for pernicious anemia and are negative, she was started on parenteral vitamin B12 by her PCP. She had a 2D echocardiogram done on 8/19/2019, revealing an overall stable LVEF, 55 to 60%. RT started 09/13/2019 and was completed on 10/10/2019. DEXA scan was done at [facility] on 10/18/2019 revealing normal bone density to the LS and paulo hips. She was started on adjuvant endocrine therapy with Arimidex on 10/22/2019,she is tolerating it well overall, she has pain in the lower extremities, which she believes is due to exercising. We discussed the possibility of having joint pain from the Arimidex, if the pain does not go away we can try Femara or tamoxifen. Continue to monitor at this time. Continue calcium and vitamin D.    2D-Echo done on 11/18/209 revealed LVEF 55-60%. Herceptin cycle #8 was on 01/29/2020.    2D-Echo done on 02/10/2020 revealed LVEF 60%. Herceptin cycle #9 is today 02/18/2020. Labs reviewed, okay to proceed.   She has mood changes and musculoskeletal pain continue to

## 2020-03-10 ENCOUNTER — HOSPITAL ENCOUNTER (OUTPATIENT)
Dept: INFUSION THERAPY | Age: 55
Discharge: HOME | End: 2020-03-10
Payer: COMMERCIAL

## 2020-03-10 ENCOUNTER — OFFICE VISIT (OUTPATIENT)
Dept: ONCOLOGY | Age: 55
End: 2020-03-10
Payer: COMMERCIAL

## 2020-03-10 VITALS
DIASTOLIC BLOOD PRESSURE: 57 MMHG | SYSTOLIC BLOOD PRESSURE: 125 MMHG | HEART RATE: 65 BPM | RESPIRATION RATE: 18 BRPM | TEMPERATURE: 99.1 F

## 2020-03-10 VITALS
DIASTOLIC BLOOD PRESSURE: 53 MMHG | BODY MASS INDEX: 23.65 KG/M2 | TEMPERATURE: 98.4 F | HEART RATE: 67 BPM | SYSTOLIC BLOOD PRESSURE: 114 MMHG | WEIGHT: 133.5 LBS | OXYGEN SATURATION: 99 % | HEIGHT: 63 IN

## 2020-03-10 DIAGNOSIS — C50.911 INFILTRATING DUCTAL CARCINOMA OF RIGHT BREAST, STAGE 1 (HCC): Primary | ICD-10-CM

## 2020-03-10 PROCEDURE — 99214 OFFICE O/P EST MOD 30 MIN: CPT | Performed by: INTERNAL MEDICINE

## 2020-03-10 PROCEDURE — 96413 CHEMO IV INFUSION 1 HR: CPT

## 2020-03-10 PROCEDURE — 6360000002 HC RX W HCPCS: Performed by: NURSE PRACTITIONER

## 2020-03-10 PROCEDURE — 2580000003 HC RX 258: Performed by: NURSE PRACTITIONER

## 2020-03-10 RX ORDER — METHYLPREDNISOLONE SODIUM SUCCINATE 125 MG/2ML
125 INJECTION, POWDER, LYOPHILIZED, FOR SOLUTION INTRAMUSCULAR; INTRAVENOUS ONCE
Status: CANCELLED | OUTPATIENT
Start: 2020-03-10

## 2020-03-10 RX ORDER — 0.9 % SODIUM CHLORIDE 0.9 %
10 VIAL (ML) INJECTION ONCE
Status: CANCELLED | OUTPATIENT
Start: 2020-03-10

## 2020-03-10 RX ORDER — SODIUM CHLORIDE 9 MG/ML
INJECTION, SOLUTION INTRAVENOUS CONTINUOUS
Status: CANCELLED | OUTPATIENT
Start: 2020-03-10

## 2020-03-10 RX ORDER — HEPARIN SODIUM (PORCINE) LOCK FLUSH IV SOLN 100 UNIT/ML 100 UNIT/ML
500 SOLUTION INTRAVENOUS PRN
Status: DISCONTINUED | OUTPATIENT
Start: 2020-03-10 | End: 2020-03-11 | Stop reason: HOSPADM

## 2020-03-10 RX ORDER — SODIUM CHLORIDE 0.9 % (FLUSH) 0.9 %
10 SYRINGE (ML) INJECTION PRN
Status: DISCONTINUED | OUTPATIENT
Start: 2020-03-10 | End: 2020-03-11 | Stop reason: HOSPADM

## 2020-03-10 RX ORDER — SODIUM CHLORIDE 0.9 % (FLUSH) 0.9 %
5 SYRINGE (ML) INJECTION PRN
Status: CANCELLED | OUTPATIENT
Start: 2020-03-10

## 2020-03-10 RX ORDER — MEPERIDINE HYDROCHLORIDE 25 MG/ML
12.5 INJECTION INTRAMUSCULAR; INTRAVENOUS; SUBCUTANEOUS ONCE
Status: CANCELLED | OUTPATIENT
Start: 2020-03-10

## 2020-03-10 RX ORDER — EPINEPHRINE 1 MG/ML
0.3 INJECTION, SOLUTION, CONCENTRATE INTRAVENOUS PRN
Status: CANCELLED | OUTPATIENT
Start: 2020-03-10

## 2020-03-10 RX ORDER — DIPHENHYDRAMINE HYDROCHLORIDE 50 MG/ML
50 INJECTION INTRAMUSCULAR; INTRAVENOUS ONCE
Status: CANCELLED | OUTPATIENT
Start: 2020-03-10

## 2020-03-10 RX ORDER — SODIUM CHLORIDE 9 MG/ML
20 INJECTION, SOLUTION INTRAVENOUS ONCE
Status: DISCONTINUED | OUTPATIENT
Start: 2020-03-10 | End: 2020-03-11 | Stop reason: HOSPADM

## 2020-03-10 RX ADMIN — SODIUM CHLORIDE, PRESERVATIVE FREE 10 ML: 5 INJECTION INTRAVENOUS at 09:39

## 2020-03-10 RX ADMIN — HEPARIN 500 UNITS: 100 SYRINGE at 09:39

## 2020-03-10 RX ADMIN — SODIUM CHLORIDE, PRESERVATIVE FREE 10 ML: 5 INJECTION INTRAVENOUS at 08:34

## 2020-03-10 RX ADMIN — SODIUM CHLORIDE 20 ML/HR: 9 INJECTION, SOLUTION INTRAVENOUS at 08:37

## 2020-03-10 RX ADMIN — TRASTUZUMAB 357 MG: 150 INJECTION, POWDER, LYOPHILIZED, FOR SOLUTION INTRAVENOUS at 08:59

## 2020-03-10 NOTE — PROGRESS NOTES
NEEDLE LOCALIZATION AND SENTINEL NODE performed by Rosa Marquez MD at [facility] 13 / REMOVAL / REPLACEMENT VENOUS ACCESS CATHETER N/A 5/17/2019    PORT INSERTION performed by Elza Ruggiero MD at [facility] OR       Family History:  Family History   Problem Relation Age of Onset    Breast Cancer Mother     Cancer Father         esophageal, stomach    Cancer Paternal Grandfather         colon       Medications:  Reviewed and reconciled. Social History:  Social History     Socioeconomic History    Marital status:      Spouse name: Ute Rival Number of children: 4    Years of education: 18     Highest education level: Bachelor's degree (e.g., BA, AB, BS)   Occupational History    Occupation: lab technican     Employer: [employer] [address] [employer] Financial resource strain: Not hard at all    Food insecurity     Worry: Never true     Inability: Never true   [employer] needs     Medical: No     Non-medical: No   Tobacco Use    Smoking status: Never Smoker    Smokeless tobacco: Never Used   Substance and Sexual Activity    Alcohol use: Not Currently    Drug use: Never    Sexual activity: Yes     Partners: Male   Lifestyle    Physical activity     Days per week: 4 days     Minutes per session: 40 min    Stress:  To some extent   Relationships    Social connections     Talks on phone: More than three times a week     Gets together: More than three times a week     Attends [employer] service: 1 to 4 times per year     Active member of club or organization: No     Attends meetings of clubs or organizations: Never     Relationship status:     Intimate partner violence     Fear of current or ex partner: No     Emotionally abused: No     Physically abused: No     Forced sexual activity: No   Other Topics Concern    Not on file   Social History Narrative    Not on file       Allergies:  No Known the diagnosis of breast cancer, she is very well informed about her disease, she prefers to be treated, [address] was originally planned, but given the size of the tumor (<5mm), decided to treat with Taxol (weekly for 12 weeks) with herceptin, the side effects of the treatment including were discussed with the patient, 2D echo has been done LVEF is adequate for treatment with Herceptin. Patient with a personal history of breast cancer, family history is positive for breast and ovarian cancer, testing for HBOC is recommended, MyRisk testing was done, neg for clinically significant mutations. She was started adjuvant chemotherapy with Taxol and Herceptin on 5/28/2019. Completed Taxol on 8/20/2019. Labs reviewed, proceed with single agent Herceptin today 9/10/2019. Mild macrocytosis, ordered by B-12 and folate, B12 is borderline, ordered MMA, it is not elevated, antiparietal cells and intrinsic factor antibodies done to evaluate for pernicious anemia and are negative, she was started on parenteral vitamin B12 by her PCP. She had a 2D echocardiogram done on 8/19/2019, revealing an overall stable LVEF, 55 to 60%. RT started 09/13/2019 and was completed on 10/10/2019. DEXA scan was done at [facility] on 10/18/2019 revealing normal bone density to the LS and paulo hips. She was started on adjuvant endocrine therapy with Arimidex on 10/22/2019,she is tolerating it well overall, she has pain in the lower extremities, which she believes is due to exercising. We discussed the possibility of having joint pain from the Arimidex, if the pain does not go away we can try Femara or tamoxifen. Continue to monitor at this time. Continue calcium and vitamin D.    2D-Echo done on 11/18/209 revealed LVEF 55-60%. Herceptin cycle #8 was on 01/29/2020.    2D-Echo done on 02/10/2020 revealed LVEF 60%. Herceptin cycle #8  Was on 02/18/2020. Herceptin cycle #10 is today 03/10/2020.  Labs reviewed, okay

## 2020-03-31 ENCOUNTER — OFFICE VISIT (OUTPATIENT)
Dept: ONCOLOGY | Age: 55
End: 2020-03-31
Payer: COMMERCIAL

## 2020-03-31 ENCOUNTER — HOSPITAL ENCOUNTER (OUTPATIENT)
Dept: INFUSION THERAPY | Age: 55
Discharge: HOME | End: 2020-03-31
Payer: COMMERCIAL

## 2020-03-31 VITALS
RESPIRATION RATE: 18 BRPM | DIASTOLIC BLOOD PRESSURE: 57 MMHG | HEART RATE: 63 BPM | TEMPERATURE: 98.8 F | SYSTOLIC BLOOD PRESSURE: 116 MMHG

## 2020-03-31 VITALS
HEART RATE: 79 BPM | SYSTOLIC BLOOD PRESSURE: 129 MMHG | WEIGHT: 133.6 LBS | TEMPERATURE: 98.8 F | OXYGEN SATURATION: 98 % | BODY MASS INDEX: 23.67 KG/M2 | HEIGHT: 63 IN | DIASTOLIC BLOOD PRESSURE: 57 MMHG

## 2020-03-31 DIAGNOSIS — C50.911 INFILTRATING DUCTAL CARCINOMA OF RIGHT BREAST, STAGE 1 (HCC): Primary | ICD-10-CM

## 2020-03-31 PROCEDURE — 99214 OFFICE O/P EST MOD 30 MIN: CPT | Performed by: INTERNAL MEDICINE

## 2020-03-31 PROCEDURE — 96413 CHEMO IV INFUSION 1 HR: CPT

## 2020-03-31 PROCEDURE — 6360000002 HC RX W HCPCS: Performed by: INTERNAL MEDICINE

## 2020-03-31 PROCEDURE — 2580000003 HC RX 258: Performed by: INTERNAL MEDICINE

## 2020-03-31 RX ORDER — MEPERIDINE HYDROCHLORIDE 25 MG/ML
12.5 INJECTION INTRAMUSCULAR; INTRAVENOUS; SUBCUTANEOUS ONCE
Status: CANCELLED | OUTPATIENT
Start: 2020-03-31

## 2020-03-31 RX ORDER — SODIUM CHLORIDE 0.9 % (FLUSH) 0.9 %
5 SYRINGE (ML) INJECTION PRN
Status: CANCELLED | OUTPATIENT
Start: 2020-03-31

## 2020-03-31 RX ORDER — 0.9 % SODIUM CHLORIDE 0.9 %
10 VIAL (ML) INJECTION ONCE
Status: CANCELLED | OUTPATIENT
Start: 2020-03-31

## 2020-03-31 RX ORDER — SODIUM CHLORIDE 0.9 % (FLUSH) 0.9 %
10 SYRINGE (ML) INJECTION PRN
Status: CANCELLED | OUTPATIENT
Start: 2020-03-31

## 2020-03-31 RX ORDER — EPINEPHRINE 1 MG/ML
0.3 INJECTION, SOLUTION, CONCENTRATE INTRAVENOUS PRN
Status: CANCELLED | OUTPATIENT
Start: 2020-03-31

## 2020-03-31 RX ORDER — HEPARIN SODIUM (PORCINE) LOCK FLUSH IV SOLN 100 UNIT/ML 100 UNIT/ML
500 SOLUTION INTRAVENOUS PRN
Status: CANCELLED | OUTPATIENT
Start: 2020-03-31

## 2020-03-31 RX ORDER — SODIUM CHLORIDE 9 MG/ML
INJECTION, SOLUTION INTRAVENOUS CONTINUOUS
Status: CANCELLED | OUTPATIENT
Start: 2020-03-31

## 2020-03-31 RX ORDER — METHYLPREDNISOLONE SODIUM SUCCINATE 125 MG/2ML
125 INJECTION, POWDER, LYOPHILIZED, FOR SOLUTION INTRAMUSCULAR; INTRAVENOUS ONCE
Status: CANCELLED | OUTPATIENT
Start: 2020-03-31

## 2020-03-31 RX ORDER — DIPHENHYDRAMINE HYDROCHLORIDE 50 MG/ML
50 INJECTION INTRAMUSCULAR; INTRAVENOUS ONCE
Status: CANCELLED | OUTPATIENT
Start: 2020-03-31

## 2020-03-31 RX ORDER — SODIUM CHLORIDE 0.9 % (FLUSH) 0.9 %
10 SYRINGE (ML) INJECTION PRN
Status: DISCONTINUED | OUTPATIENT
Start: 2020-03-31 | End: 2020-04-01 | Stop reason: HOSPADM

## 2020-03-31 RX ORDER — SODIUM CHLORIDE 9 MG/ML
20 INJECTION, SOLUTION INTRAVENOUS ONCE
Status: CANCELLED | OUTPATIENT
Start: 2020-03-31

## 2020-03-31 RX ORDER — SODIUM CHLORIDE 9 MG/ML
20 INJECTION, SOLUTION INTRAVENOUS ONCE
Status: DISCONTINUED | OUTPATIENT
Start: 2020-03-31 | End: 2020-04-01 | Stop reason: HOSPADM

## 2020-03-31 RX ORDER — HEPARIN SODIUM (PORCINE) LOCK FLUSH IV SOLN 100 UNIT/ML 100 UNIT/ML
500 SOLUTION INTRAVENOUS PRN
Status: DISCONTINUED | OUTPATIENT
Start: 2020-03-31 | End: 2020-04-01 | Stop reason: HOSPADM

## 2020-03-31 RX ADMIN — SODIUM CHLORIDE, PRESERVATIVE FREE 10 ML: 5 INJECTION INTRAVENOUS at 09:28

## 2020-03-31 RX ADMIN — SODIUM CHLORIDE, PRESERVATIVE FREE 10 ML: 5 INJECTION INTRAVENOUS at 08:41

## 2020-03-31 RX ADMIN — SODIUM CHLORIDE 20 ML/HR: 9 INJECTION, SOLUTION INTRAVENOUS at 08:41

## 2020-03-31 RX ADMIN — HEPARIN SODIUM (PORCINE) LOCK FLUSH IV SOLN 100 UNIT/ML 500 UNITS: 100 SOLUTION at 09:28

## 2020-03-31 RX ADMIN — TRASTUZUMAB 357 MG: 150 INJECTION, POWDER, LYOPHILIZED, FOR SOLUTION INTRAVENOUS at 08:51

## 2020-03-31 NOTE — PROGRESS NOTES
therapy. Pathologic stage: pT1a-0.2 cm maximum dimension. Regional lymph nodes: (sn) pN0-no regional lymph node metastasis. Distant metastasis: pMX-cannot be assessed. Ancillary studies: Performed on the patient's previous biopsy specimen  (N-; 3/29/2019). Microcalcifications: Present in DCIS. Additional pathologic findings: Fibrocystic change.  Previous  biopsy/excision site. The patient has a small tumor, HER-2/dougie and hormone receptor positive, given her young age and excellent health, she was not comfortable with adjuvant treatment with only hormonal therapy. The patient was started on adjuvant chemotherapy with Taxol and Herceptin on 5/28/2019, is currently on single agent Herceptin, tolerating it well, she completed adjuvant RT on 10/10/2019. The patient was started on adjuvant endocrine therapy with Arimidex on 10/20/2019, she is tolerating it well overall. And is feeling well overall, the upper respiratory infection had resolved. Review of Systems;  CONSTITUTIONAL: No fever, chills. Good appetite and energy level. ENMT: Eyes: No diplopia; Nose: No epistaxis. Mouth: No sore throat. RESPIRATORY: No hemoptysis, shortness of breath, cough. CARDIOVASCULAR: No chest pain, palpitations. GASTROINTESTINAL: No nausea/vomiting, abdominal pain, diarrhea/constipation. GENITOURINARY: No dysuria, urinary frequency, hematuria. NEURO: No syncope, presyncope, headache. MSK: she has chronic pain in the back and right hip.   Remainder:  ROS NEGATIVE    Past Medical History:      Diagnosis Date    Cancer [facility])     malignant lesion in Right breast - for OR 4-18-19     Hyperlipidemia     no medications     Post-operative state 5/30/2019     Patient Active Problem List   Diagnosis    Infiltrating ductal carcinoma of right breast, stage 1 ([facility] [address]        Past Surgical History:      Procedure Laterality Date    BREAST BIOPSY      BREAST BIOPSY Right 4/18/2019    RIGHT PARTIAL MASTECTOMY WITH NEEDLE LOCALIZATION AND SENTINEL NODE performed by Tripp Powers MD at [facility] 13 / REMOVAL / REPLACEMENT VENOUS ACCESS CATHETER N/A 5/17/2019    PORT INSERTION performed by Nina Turner MD at [facility] OR       Family History:  Family History   Problem Relation Age of Onset    Breast Cancer Mother     Cancer Father         esophageal, stomach    Cancer Paternal Grandfather         colon       Medications:  Reviewed and reconciled. Social History:  Social History     Socioeconomic History    Marital status:      Spouse name: Chris Schofield Number of children: 4    Years of education: 18     Highest education level: Bachelor's degree (e.g., BA, AB, BS)   Occupational History    Occupation: lab technican     Employer: [employer] [address] [employer] Financial resource strain: Not hard at all    Food insecurity     Worry: Never true     Inability: Never true   [employer] needs     Medical: No     Non-medical: No   Tobacco Use    Smoking status: Never Smoker    Smokeless tobacco: Never Used   Substance and Sexual Activity    Alcohol use: Not Currently    Drug use: Never    Sexual activity: Yes     Partners: Male   Lifestyle    Physical activity     Days per week: 4 days     Minutes per session: 40 min    Stress:  To some extent   Relationships    Social connections     Talks on phone: More than three times a week     Gets together: More than three times a week     Attends Denominational service: 1 to 4 times per year     Active member of club or organization: No     Attends meetings of clubs or organizations: Never     Relationship status:     Intimate partner violence     Fear of current or ex partner: No     Emotionally abused: No     Physically abused: No     Forced sexual activity: No   Other Topics Concern    Not on file   Social History Narrative    Not on file       Allergies:  No Known Allergies     OB/GYN:  The patient is , she had her menarche at the age of 11-12, she had a hysterectomy, no oophorectomies, 1st live birth was at 20, she used OCPs for about 10 years. Physical Exam:  BP (!) 129/57 (Site: Left Upper Arm, Position: Sitting, Cuff Size: Medium Adult)   Pulse 79   Temp 98.8 °F (37.1 °C) (Temporal)   Ht 5' 3\" (1.6 m)   Wt 133 lb 9.6 oz (60.6 kg)   SpO2 98%   BMI 23.67 kg/m²   GENERAL: Alert, oriented x 3, not in acute distress. HEENT: PERRLA; EOMI. Oropharynx clear. NECK: Supple. No palpable cervical or supraclavicular lymphadenopathy. LUNGS: Good air entry bilaterally. No wheezing, crackles or rhonchi. CHEST: right sided port. CARDIOVASCULAR: Regular rate. No murmurs, rubs or gallops. ABDOMEN: Soft. Non-tender, non-distended. Positive bowel sounds. EXTREMITIES: Without clubbing, cyanosis, or edema. NEUROLOGIC: No focal deficits. ECOG PS 0    Impression/Plan:      The patient is a 53 y.o. postmenopausal lady with a PMH significant for hyperlipidemia, who had presented with an abnormal screening Mammogram, she was diagnosed with a right breast invasive ductal carcinoma, grade 2, measuring 9 mm, ER positive, 80% [place] +5% HER-2/dougie positive, 3+ by IHC. Patient with clinical stage [id number] disease, she underwent on 2019 a right breast needle localized lumpectomy, with SLN excisional biopsy, tumor size is 2mm, grade 2, with associated DCIS, measuring 7mm, high grade, comedo type with central necrosis, margins are negative, 4 SN were removed, they were all neg for metastatic disease, final pathologic stage pT1a(sn)pN0M0, prognostic stage IA disease. I discussed with the patient and her spouse pathology results, stage and prognosis, adjuvant RT and adjuvant ET with an AI are recommended.  The patient has a small Her2 pos disease (<5mm), I reviewed with her the nccn guidelines and the risks of the  Chemotherapy, she is young and healthy, she is very nervous about

## 2020-04-21 ENCOUNTER — OFFICE VISIT (OUTPATIENT)
Dept: ONCOLOGY | Age: 55
End: 2020-04-21
Payer: COMMERCIAL

## 2020-04-21 ENCOUNTER — HOSPITAL ENCOUNTER (OUTPATIENT)
Dept: INFUSION THERAPY | Age: 55
Discharge: HOME | End: 2020-04-21
Payer: COMMERCIAL

## 2020-04-21 VITALS
HEIGHT: 63 IN | WEIGHT: 133.6 LBS | BODY MASS INDEX: 23.67 KG/M2 | DIASTOLIC BLOOD PRESSURE: 58 MMHG | HEART RATE: 71 BPM | SYSTOLIC BLOOD PRESSURE: 131 MMHG | RESPIRATION RATE: 16 BRPM | TEMPERATURE: 98.7 F

## 2020-04-21 VITALS — SYSTOLIC BLOOD PRESSURE: 104 MMHG | DIASTOLIC BLOOD PRESSURE: 58 MMHG

## 2020-04-21 DIAGNOSIS — C50.911 INFILTRATING DUCTAL CARCINOMA OF RIGHT BREAST, STAGE 1 (HCC): Primary | ICD-10-CM

## 2020-04-21 PROCEDURE — 96413 CHEMO IV INFUSION 1 HR: CPT

## 2020-04-21 PROCEDURE — 6360000002 HC RX W HCPCS: Performed by: INTERNAL MEDICINE

## 2020-04-21 PROCEDURE — 2580000003 HC RX 258: Performed by: INTERNAL MEDICINE

## 2020-04-21 PROCEDURE — 99214 OFFICE O/P EST MOD 30 MIN: CPT | Performed by: INTERNAL MEDICINE

## 2020-04-21 RX ORDER — HEPARIN SODIUM (PORCINE) LOCK FLUSH IV SOLN 100 UNIT/ML 100 UNIT/ML
500 SOLUTION INTRAVENOUS PRN
Status: CANCELLED | OUTPATIENT
Start: 2020-04-21

## 2020-04-21 RX ORDER — HEPARIN SODIUM (PORCINE) LOCK FLUSH IV SOLN 100 UNIT/ML 100 UNIT/ML
500 SOLUTION INTRAVENOUS PRN
Status: DISCONTINUED | OUTPATIENT
Start: 2020-04-21 | End: 2020-04-22 | Stop reason: HOSPADM

## 2020-04-21 RX ORDER — EPINEPHRINE 1 MG/ML
0.3 INJECTION, SOLUTION, CONCENTRATE INTRAVENOUS PRN
Status: CANCELLED | OUTPATIENT
Start: 2020-04-21

## 2020-04-21 RX ORDER — METHYLPREDNISOLONE SODIUM SUCCINATE 125 MG/2ML
125 INJECTION, POWDER, LYOPHILIZED, FOR SOLUTION INTRAMUSCULAR; INTRAVENOUS ONCE
Status: CANCELLED | OUTPATIENT
Start: 2020-04-21

## 2020-04-21 RX ORDER — MEPERIDINE HYDROCHLORIDE 25 MG/ML
12.5 INJECTION INTRAMUSCULAR; INTRAVENOUS; SUBCUTANEOUS ONCE
Status: CANCELLED | OUTPATIENT
Start: 2020-04-21

## 2020-04-21 RX ORDER — DIPHENHYDRAMINE HYDROCHLORIDE 50 MG/ML
50 INJECTION INTRAMUSCULAR; INTRAVENOUS ONCE
Status: CANCELLED | OUTPATIENT
Start: 2020-04-21

## 2020-04-21 RX ORDER — SODIUM CHLORIDE 9 MG/ML
20 INJECTION, SOLUTION INTRAVENOUS ONCE
Status: CANCELLED | OUTPATIENT
Start: 2020-04-21

## 2020-04-21 RX ORDER — SODIUM CHLORIDE 0.9 % (FLUSH) 0.9 %
5 SYRINGE (ML) INJECTION PRN
Status: CANCELLED | OUTPATIENT
Start: 2020-04-21

## 2020-04-21 RX ORDER — 0.9 % SODIUM CHLORIDE 0.9 %
10 VIAL (ML) INJECTION ONCE
Status: CANCELLED | OUTPATIENT
Start: 2020-04-21

## 2020-04-21 RX ORDER — SODIUM CHLORIDE 0.9 % (FLUSH) 0.9 %
10 SYRINGE (ML) INJECTION PRN
Status: DISCONTINUED | OUTPATIENT
Start: 2020-04-21 | End: 2020-04-22 | Stop reason: HOSPADM

## 2020-04-21 RX ORDER — SODIUM CHLORIDE 0.9 % (FLUSH) 0.9 %
10 SYRINGE (ML) INJECTION PRN
Status: CANCELLED | OUTPATIENT
Start: 2020-04-21

## 2020-04-21 RX ORDER — SODIUM CHLORIDE 9 MG/ML
INJECTION, SOLUTION INTRAVENOUS CONTINUOUS
Status: CANCELLED | OUTPATIENT
Start: 2020-04-21

## 2020-04-21 RX ORDER — SODIUM CHLORIDE 9 MG/ML
20 INJECTION, SOLUTION INTRAVENOUS ONCE
Status: COMPLETED | OUTPATIENT
Start: 2020-04-21 | End: 2020-04-21

## 2020-04-21 RX ADMIN — TRASTUZUMAB 357 MG: 150 INJECTION, POWDER, LYOPHILIZED, FOR SOLUTION INTRAVENOUS at 09:04

## 2020-04-21 RX ADMIN — SODIUM CHLORIDE 20 ML/HR: 9 INJECTION, SOLUTION INTRAVENOUS at 09:04

## 2020-04-21 RX ADMIN — SODIUM CHLORIDE, PRESERVATIVE FREE 10 ML: 5 INJECTION INTRAVENOUS at 09:48

## 2020-04-21 RX ADMIN — SODIUM CHLORIDE, PRESERVATIVE FREE 10 ML: 5 INJECTION INTRAVENOUS at 09:00

## 2020-04-21 RX ADMIN — HEPARIN SODIUM (PORCINE) LOCK FLUSH IV SOLN 100 UNIT/ML 500 UNITS: 100 SOLUTION at 09:48

## 2020-05-04 ENCOUNTER — HOSPITAL ENCOUNTER (OUTPATIENT)
Dept: GENERAL RADIOLOGY | Age: 55
Discharge: HOME | End: 2020-05-06
Payer: COMMERCIAL

## 2020-05-04 PROCEDURE — G0279 TOMOSYNTHESIS, MAMMO: HCPCS

## 2020-05-08 NOTE — PROGRESS NOTES
therapy. Pathologic stage: pT1a-0.2 cm maximum dimension. Regional lymph nodes: (sn) pN0-no regional lymph node metastasis. Distant metastasis: pMX-cannot be assessed. Ancillary studies: Performed on the patient's previous biopsy specimen  (DRN-; 3/29/2019). Microcalcifications: Present in DCIS. Additional pathologic findings: Fibrocystic change.  Previous  biopsy/excision site. The patient has a small tumor, HER-2/dougie and hormone receptor positive, given her young age and excellent health, she was not comfortable with adjuvant treatment with only hormonal therapy. The patient was started on adjuvant chemotherapy with Taxol and Herceptin on 5/28/2019, is currently on single agent Herceptin, tolerating it well, she completed adjuvant RT on 10/10/2019. The patient was started on adjuvant endocrine therapy with Arimidex on 10/20/2019, she is tolerating it well overall. The patient is feeling well, her fingernails are brittle otherwise doing well. Review of Systems;  CONSTITUTIONAL: No fever, chills. Good appetite and energy level. ENMT: Eyes: No diplopia; Nose: No epistaxis. Mouth: No sore throat. RESPIRATORY: No hemoptysis, shortness of breath, cough. CARDIOVASCULAR: No chest pain, palpitations. GASTROINTESTINAL: No nausea/vomiting, abdominal pain, diarrhea/constipation. GENITOURINARY: No dysuria, urinary frequency, hematuria. NEURO: No syncope, presyncope, headache. MSK: she has chronic pain in the back and right hip.   Remainder:  ROS NEGATIVE    Past Medical History:      Diagnosis Date    Cancer [facility])     malignant lesion in Right breast - for OR 4-18-19     Hyperlipidemia     no medications     Post-operative state 5/30/2019     Patient Active Problem List   Diagnosis    Infiltrating ductal carcinoma of right breast, stage 1 ([address]        Past Surgical History:      Procedure Laterality Date    BREAST BIOPSY      BREAST BIOPSY Right 4/18/2019    RIGHT PARTIAL MASTECTOMY WITH NEEDLE LOCALIZATION AND SENTINEL NODE performed by Richie Ramsey MD at [facility] 13 / REMOVAL / REPLACEMENT VENOUS ACCESS CATHETER N/A 5/17/2019    PORT INSERTION performed by Kaylynn Callahan MD at [facility] OR       Family History:  Family History   Problem Relation Age of Onset    Breast Cancer Mother     Cancer Father         esophageal, stomach    Cancer Paternal Grandfather         colon       Medications:  Reviewed and reconciled. Social History:  Social History     Socioeconomic History    Marital status:      Spouse name: Nelida Cortés Number of children: 4    Years of education: 18     Highest education level: Bachelor's degree (e.g., BA, AB, BS)   Occupational History    Occupation: lab technican     Employer: [employer] Financial resource strain: Not hard at all    Food insecurity     Worry: Never true     Inability: Never true   [employer] needs     Medical: No     Non-medical: No   Tobacco Use    Smoking status: Never Smoker    Smokeless tobacco: Never Used   Substance and Sexual Activity    Alcohol use: Not Currently    Drug use: Never    Sexual activity: Yes     Partners: Male   Lifestyle    Physical activity     Days per week: 4 days     Minutes per session: 40 min    Stress:  To some extent   Relationships    Social connections     Talks on phone: More than three times a week     Gets together: More than three times a week     Attends S[employer] service: 1 to 4 times per year     Active member of club or organization: No     Attends meetings of clubs or organizations: Never     Relationship status:     Intimate partner violence     Fear of current or ex partner: No     Emotionally abused: No     Physically abused: No     Forced sexual activity: No   Other Topics Concern    Not on file   Social History Narrative    Not on file       Allergies:  No Known HEMATOLOGY/MEDICAL ONCOLOGY  [facility]  [address] [facility] [address]  Dept: [phone]

## 2020-05-12 ENCOUNTER — HOSPITAL ENCOUNTER (OUTPATIENT)
Dept: INFUSION THERAPY | Age: 55
Discharge: HOME | End: 2020-05-12
Payer: COMMERCIAL

## 2020-05-12 ENCOUNTER — OFFICE VISIT (OUTPATIENT)
Dept: ONCOLOGY | Age: 55
End: 2020-05-12
Payer: COMMERCIAL

## 2020-05-12 VITALS
TEMPERATURE: 98.8 F | HEART RATE: 70 BPM | SYSTOLIC BLOOD PRESSURE: 118 MMHG | OXYGEN SATURATION: 100 % | DIASTOLIC BLOOD PRESSURE: 61 MMHG | WEIGHT: 133.2 LBS | BODY MASS INDEX: 23.6 KG/M2 | HEIGHT: 63 IN

## 2020-05-12 VITALS
SYSTOLIC BLOOD PRESSURE: 111 MMHG | RESPIRATION RATE: 18 BRPM | TEMPERATURE: 99.4 F | HEART RATE: 62 BPM | DIASTOLIC BLOOD PRESSURE: 65 MMHG

## 2020-05-12 DIAGNOSIS — C50.911 INFILTRATING DUCTAL CARCINOMA OF RIGHT BREAST, STAGE 1 (HCC): Primary | ICD-10-CM

## 2020-05-12 PROCEDURE — 96413 CHEMO IV INFUSION 1 HR: CPT

## 2020-05-12 PROCEDURE — 6360000002 HC RX W HCPCS: Performed by: INTERNAL MEDICINE

## 2020-05-12 PROCEDURE — 2580000003 HC RX 258: Performed by: INTERNAL MEDICINE

## 2020-05-12 PROCEDURE — 99214 OFFICE O/P EST MOD 30 MIN: CPT | Performed by: INTERNAL MEDICINE

## 2020-05-12 RX ORDER — HEPARIN SODIUM (PORCINE) LOCK FLUSH IV SOLN 100 UNIT/ML 100 UNIT/ML
500 SOLUTION INTRAVENOUS PRN
Status: CANCELLED | OUTPATIENT
Start: 2020-05-12

## 2020-05-12 RX ORDER — SODIUM CHLORIDE 9 MG/ML
INJECTION, SOLUTION INTRAVENOUS CONTINUOUS
Status: CANCELLED | OUTPATIENT
Start: 2020-05-12

## 2020-05-12 RX ORDER — HEPARIN SODIUM (PORCINE) LOCK FLUSH IV SOLN 100 UNIT/ML 100 UNIT/ML
500 SOLUTION INTRAVENOUS PRN
Status: DISCONTINUED | OUTPATIENT
Start: 2020-05-12 | End: 2020-05-13 | Stop reason: HOSPADM

## 2020-05-12 RX ORDER — 0.9 % SODIUM CHLORIDE 0.9 %
10 VIAL (ML) INJECTION ONCE
Status: CANCELLED | OUTPATIENT
Start: 2020-05-12

## 2020-05-12 RX ORDER — DIPHENHYDRAMINE HYDROCHLORIDE 50 MG/ML
50 INJECTION INTRAMUSCULAR; INTRAVENOUS ONCE
Status: CANCELLED | OUTPATIENT
Start: 2020-05-12

## 2020-05-12 RX ORDER — SODIUM CHLORIDE 0.9 % (FLUSH) 0.9 %
10 SYRINGE (ML) INJECTION PRN
Status: DISCONTINUED | OUTPATIENT
Start: 2020-05-12 | End: 2020-05-13 | Stop reason: HOSPADM

## 2020-05-12 RX ORDER — METHYLPREDNISOLONE SODIUM SUCCINATE 125 MG/2ML
125 INJECTION, POWDER, LYOPHILIZED, FOR SOLUTION INTRAMUSCULAR; INTRAVENOUS ONCE
Status: CANCELLED | OUTPATIENT
Start: 2020-05-12

## 2020-05-12 RX ORDER — SODIUM CHLORIDE 0.9 % (FLUSH) 0.9 %
5 SYRINGE (ML) INJECTION PRN
Status: CANCELLED | OUTPATIENT
Start: 2020-05-12

## 2020-05-12 RX ORDER — SODIUM CHLORIDE 9 MG/ML
20 INJECTION, SOLUTION INTRAVENOUS ONCE
Status: DISCONTINUED | OUTPATIENT
Start: 2020-05-12 | End: 2020-05-13 | Stop reason: HOSPADM

## 2020-05-12 RX ORDER — MEPERIDINE HYDROCHLORIDE 25 MG/ML
12.5 INJECTION INTRAMUSCULAR; INTRAVENOUS; SUBCUTANEOUS ONCE
Status: CANCELLED | OUTPATIENT
Start: 2020-05-12

## 2020-05-12 RX ORDER — EPINEPHRINE 1 MG/ML
0.3 INJECTION, SOLUTION, CONCENTRATE INTRAVENOUS PRN
Status: CANCELLED | OUTPATIENT
Start: 2020-05-12

## 2020-05-12 RX ORDER — SODIUM CHLORIDE 0.9 % (FLUSH) 0.9 %
10 SYRINGE (ML) INJECTION PRN
Status: CANCELLED | OUTPATIENT
Start: 2020-05-12

## 2020-05-12 RX ORDER — SODIUM CHLORIDE 9 MG/ML
20 INJECTION, SOLUTION INTRAVENOUS ONCE
Status: CANCELLED | OUTPATIENT
Start: 2020-05-12

## 2020-05-12 RX ADMIN — HEPARIN SODIUM (PORCINE) LOCK FLUSH IV SOLN 100 UNIT/ML 500 UNITS: 100 SOLUTION at 09:31

## 2020-05-12 RX ADMIN — TRASTUZUMAB 357 MG: 150 INJECTION, POWDER, LYOPHILIZED, FOR SOLUTION INTRAVENOUS at 08:45

## 2020-05-12 RX ADMIN — SODIUM CHLORIDE, PRESERVATIVE FREE 10 ML: 5 INJECTION INTRAVENOUS at 08:46

## 2020-05-12 RX ADMIN — SODIUM CHLORIDE 20 ML/HR: 9 INJECTION, SOLUTION INTRAVENOUS at 08:46

## 2020-06-02 ENCOUNTER — OFFICE VISIT (OUTPATIENT)
Dept: ONCOLOGY | Age: 55
End: 2020-06-02
Payer: COMMERCIAL

## 2020-06-02 ENCOUNTER — HOSPITAL ENCOUNTER (OUTPATIENT)
Dept: INFUSION THERAPY | Age: 55
Discharge: HOME | End: 2020-06-02
Payer: COMMERCIAL

## 2020-06-02 VITALS
SYSTOLIC BLOOD PRESSURE: 125 MMHG | DIASTOLIC BLOOD PRESSURE: 58 MMHG | HEART RATE: 66 BPM | RESPIRATION RATE: 18 BRPM | TEMPERATURE: 98.5 F | OXYGEN SATURATION: 99 %

## 2020-06-02 VITALS
HEIGHT: 63 IN | OXYGEN SATURATION: 99 % | BODY MASS INDEX: 23.04 KG/M2 | TEMPERATURE: 98.6 F | HEART RATE: 72 BPM | DIASTOLIC BLOOD PRESSURE: 56 MMHG | WEIGHT: 130 LBS | SYSTOLIC BLOOD PRESSURE: 120 MMHG

## 2020-06-02 DIAGNOSIS — C50.911 INFILTRATING DUCTAL CARCINOMA OF RIGHT BREAST, STAGE 1 (HCC): Primary | ICD-10-CM

## 2020-06-02 PROCEDURE — 2580000003 HC RX 258: Performed by: INTERNAL MEDICINE

## 2020-06-02 PROCEDURE — 6360000002 HC RX W HCPCS: Performed by: INTERNAL MEDICINE

## 2020-06-02 PROCEDURE — 99212 OFFICE O/P EST SF 10 MIN: CPT | Performed by: INTERNAL MEDICINE

## 2020-06-02 PROCEDURE — 96413 CHEMO IV INFUSION 1 HR: CPT

## 2020-06-02 RX ORDER — HEPARIN SODIUM (PORCINE) LOCK FLUSH IV SOLN 100 UNIT/ML 100 UNIT/ML
500 SOLUTION INTRAVENOUS PRN
Status: CANCELLED | OUTPATIENT
Start: 2020-06-02

## 2020-06-02 RX ORDER — EPINEPHRINE 1 MG/ML
0.3 INJECTION, SOLUTION, CONCENTRATE INTRAVENOUS PRN
Status: CANCELLED | OUTPATIENT
Start: 2020-06-02

## 2020-06-02 RX ORDER — MEPERIDINE HYDROCHLORIDE 25 MG/ML
12.5 INJECTION INTRAMUSCULAR; INTRAVENOUS; SUBCUTANEOUS ONCE
Status: CANCELLED | OUTPATIENT
Start: 2020-06-02

## 2020-06-02 RX ORDER — METHYLPREDNISOLONE SODIUM SUCCINATE 125 MG/2ML
125 INJECTION, POWDER, LYOPHILIZED, FOR SOLUTION INTRAMUSCULAR; INTRAVENOUS ONCE
Status: CANCELLED | OUTPATIENT
Start: 2020-06-02

## 2020-06-02 RX ORDER — 0.9 % SODIUM CHLORIDE 0.9 %
10 VIAL (ML) INJECTION ONCE
Status: CANCELLED | OUTPATIENT
Start: 2020-06-02

## 2020-06-02 RX ORDER — SODIUM CHLORIDE 9 MG/ML
INJECTION, SOLUTION INTRAVENOUS CONTINUOUS
Status: CANCELLED | OUTPATIENT
Start: 2020-06-02

## 2020-06-02 RX ORDER — DIPHENHYDRAMINE HYDROCHLORIDE 50 MG/ML
50 INJECTION INTRAMUSCULAR; INTRAVENOUS ONCE
Status: CANCELLED | OUTPATIENT
Start: 2020-06-02

## 2020-06-02 RX ORDER — HEPARIN SODIUM (PORCINE) LOCK FLUSH IV SOLN 100 UNIT/ML 100 UNIT/ML
500 SOLUTION INTRAVENOUS PRN
Status: DISCONTINUED | OUTPATIENT
Start: 2020-06-02 | End: 2020-06-03 | Stop reason: HOSPADM

## 2020-06-02 RX ORDER — SODIUM CHLORIDE 0.9 % (FLUSH) 0.9 %
10 SYRINGE (ML) INJECTION PRN
Status: CANCELLED | OUTPATIENT
Start: 2020-06-02

## 2020-06-02 RX ORDER — SODIUM CHLORIDE 0.9 % (FLUSH) 0.9 %
10 SYRINGE (ML) INJECTION PRN
Status: DISCONTINUED | OUTPATIENT
Start: 2020-06-02 | End: 2020-06-03 | Stop reason: HOSPADM

## 2020-06-02 RX ORDER — SODIUM CHLORIDE 9 MG/ML
20 INJECTION, SOLUTION INTRAVENOUS ONCE
Status: DISCONTINUED | OUTPATIENT
Start: 2020-06-02 | End: 2020-06-03 | Stop reason: HOSPADM

## 2020-06-02 RX ORDER — SODIUM CHLORIDE 0.9 % (FLUSH) 0.9 %
5 SYRINGE (ML) INJECTION PRN
Status: CANCELLED | OUTPATIENT
Start: 2020-06-02

## 2020-06-02 RX ORDER — SODIUM CHLORIDE 9 MG/ML
20 INJECTION, SOLUTION INTRAVENOUS ONCE
Status: CANCELLED | OUTPATIENT
Start: 2020-06-02

## 2020-06-02 RX ADMIN — HEPARIN SODIUM (PORCINE) LOCK FLUSH IV SOLN 100 UNIT/ML 500 UNITS: 100 SOLUTION at 09:34

## 2020-06-02 RX ADMIN — SODIUM CHLORIDE, PRESERVATIVE FREE 10 ML: 5 INJECTION INTRAVENOUS at 08:37

## 2020-06-02 RX ADMIN — SODIUM CHLORIDE, PRESERVATIVE FREE 10 ML: 5 INJECTION INTRAVENOUS at 09:34

## 2020-06-02 RX ADMIN — TRASTUZUMAB 357 MG: 150 INJECTION, POWDER, LYOPHILIZED, FOR SOLUTION INTRAVENOUS at 08:48

## 2020-06-02 RX ADMIN — SODIUM CHLORIDE 20 ML/HR: 9 INJECTION, SOLUTION INTRAVENOUS at 08:48

## 2020-06-09 ENCOUNTER — HOSPITAL ENCOUNTER (OUTPATIENT)
Dept: MRI IMAGING | Age: 55
Discharge: HOME | End: 2020-06-11
Payer: COMMERCIAL

## 2020-06-09 PROCEDURE — 6360000004 HC RX CONTRAST MEDICATION: Performed by: RADIOLOGY

## 2020-06-09 PROCEDURE — C8908 MRI W/O FOL W/CONT, BREAST,: HCPCS

## 2020-06-09 PROCEDURE — A9585 GADOBUTROL INJECTION: HCPCS | Performed by: RADIOLOGY

## 2020-06-09 PROCEDURE — 77049 MRI BREAST C-+ W/CAD BI: CPT

## 2020-06-09 RX ADMIN — GADOBUTROL 6 ML: 604.72 INJECTION INTRAVENOUS at 10:33

## 2020-06-11 ENCOUNTER — TELEPHONE (OUTPATIENT)
Dept: CASE MANAGEMENT | Age: 55
End: 2020-06-11

## 2020-06-15 ENCOUNTER — TELEPHONE (OUTPATIENT)
Dept: ONCOLOGY | Age: 55
End: 2020-06-15

## 2020-06-15 NOTE — TELEPHONE ENCOUNTER
Spoke to patient regarding ECHO, she stated she didn't;t think she needed another ECHO. I will follow-up with Dr. Jose Moore.   Gets all [facility] completed at [facility].

## 2020-06-19 NOTE — PROGRESS NOTES
Patient agreed to covid testing at [address] [place] [address] on 6/22/20 between the hours [id number]. Patient asked to bring ID. Patient instructed to self quarantine until after procedure.

## 2020-06-22 ENCOUNTER — HOSPITAL ENCOUNTER (OUTPATIENT)
Age: 55
Discharge: HOME | End: 2020-06-24
Payer: COMMERCIAL

## 2020-06-22 PROCEDURE — U0003 INFECTIOUS AGENT DETECTION BY NUCLEIC ACID (DNA OR RNA); SEVERE ACUTE RESPIRATORY SYNDROME CORONAVIRUS 2 (SARS-COV-2) (CORONAVIRUS DISEASE [COVID-19]), AMPLIFIED PROBE TECHNIQUE, MAKING USE OF HIGH THROUGHPUT TECHNOLOGIES AS DESCRIBED BY CMS-2020-01-R: HCPCS

## 2020-06-22 NOTE — PROGRESS NOTES
[address] PRE-ADMISSION TESTING GENERAL INSTRUCTIONS- [facility]-phone number:[phone]    GENERAL INSTRUCTIONS  [x] Antibacterial Soap shower Night before and/or AM of Surgery  [] Ron wipe instruction sheet and wipes given. [x] Nothing by mouth after midnight, including gum, candy, mints, or water. [x] You may brush your teeth, gargle, but do NOT swallow water. []Hibiclens shower  the night before and the morning of surgery. Do not use             Hibiclens on your face or head. []No smoking, chewing tobacco, illegal drugs, or alcohol within 24 hours of your surgery. [x] Jewelry, valuables or body piercing's should not be brought to the hospital. All body and/or tongue piercing's must be removed prior to arriving to hospital.  ALL hair pins must be removed. [x] Do not wear makeup, lotions, powders, deodorant. Nail polish as directed by the nurse. [x] Arrange transportation with a responsible adult  to and from the hospital. If you do not have a responsible adult  to transport you, you will need to make arrangements with a medical transportation company (i.e. [employer]. A Uber/taxi/bus is not appropriate unless you are accompanied by a responsible adult ). Arrange for someone to be with you for the remainder of the day and for 24 hours after your procedure due to having had anesthesia. Who will be your  for transportation? __HUSBAND _   Who will be staying with you for 24 hrs after your procedure? HUSBAND__  [x] Bring insurance card and photo ID.  [] Transfusion Bracelet: Please bring with you to hospital, day of surgery  [] Bring urine specimen day of surgery. Any small container is acceptable. [] Use inhalers the morning of surgery and bring with you to hospital.  [] Bring copy of living will or healthcare power of  papers to be placed in your electronic record.   [] CPAP/BI-PAP: Please bring your machine if you are to spend the night

## 2020-06-24 LAB
SARS-COV-2: NOT DETECTED
SOURCE: NORMAL

## 2020-06-25 ENCOUNTER — PREP FOR PROCEDURE (OUTPATIENT)
Dept: VASCULAR SURGERY | Age: 55
End: 2020-06-25

## 2020-06-25 ENCOUNTER — OFFICE VISIT (OUTPATIENT)
Dept: VASCULAR SURGERY | Age: 55
End: 2020-06-25
Payer: COMMERCIAL

## 2020-06-25 VITALS — RESPIRATION RATE: 16 BRPM | BODY MASS INDEX: 23.04 KG/M2 | WEIGHT: 130 LBS | HEIGHT: 63 IN

## 2020-06-25 PROCEDURE — 99215 OFFICE O/P EST HI 40 MIN: CPT | Performed by: SURGERY

## 2020-06-25 RX ORDER — SODIUM CHLORIDE 9 MG/ML
INJECTION, SOLUTION INTRAVENOUS CONTINUOUS
Status: CANCELLED | OUTPATIENT
Start: 2020-06-25

## 2020-06-25 RX ORDER — SODIUM CHLORIDE 0.9 % (FLUSH) 0.9 %
10 SYRINGE (ML) INJECTION EVERY 12 HOURS SCHEDULED
Status: CANCELLED | OUTPATIENT
Start: 2020-06-25

## 2020-06-25 RX ORDER — HYDRALAZINE HYDROCHLORIDE 20 MG/ML
INJECTION INTRAMUSCULAR; INTRAVENOUS
Status: DISPENSED
Start: 2020-06-25 | End: 2020-06-25

## 2020-06-25 RX ORDER — SODIUM CHLORIDE 0.9 % (FLUSH) 0.9 %
10 SYRINGE (ML) INJECTION PRN
Status: CANCELLED | OUTPATIENT
Start: 2020-06-25

## 2020-06-25 NOTE — PROGRESS NOTES
.  Gastrointestinal:  No nausea or vomiting; no abdominal pain or rectal bleeding  Musculoskeletal:  No arthritis or weakness. Neurologic:  No paralysis, paresis, paresthesia, seizures or headaches  Hematologic/Lymphatic/Immunologic:  No anemia, abnormal bleeding/bruising, fever, chills or night sweats. History of carcinoma the breast, currently in remission  Endocrine:  No heat or cold intolerance. No polyphagia, polydipsia or polyuria. Physical Exam:  General appearance:  Alert, awake, oriented x 3. No distress. Skin:  Warm and dry  Head:  Normocephalic. No masses, lesions or tenderness  Eyes:  Conjunctivae appear normal; PERRL  Ears:  External ears normal  Nose/Sinuses:  Septum midline, mucosa normal; no drainage  Oropharynx:  Clear, no exudate noted  Neck:  No jugular venous distention, lymphadenopathy or thyromegaly. No evidence of carotid bruit  Chest: Venous port noted over right subclavian fossa  Lungs:  Clear to ausculation bilaterally. No rhonchi, crackles, wheezes  Heart:  Regular rate and rhythm. No rub or murmur  Abdomen:  Soft, non-tender. No masses, organomegaly. Musculoskeletal : No joint effusions, tenderness swelling    Neuro: Speech is intact. Moving all extremities. No focal motor or sensory deficits      Extremities:  Both feet are warm to touch. The color of both feet is normal.        Pulses Right  Left    Brachial 3 3    Radial    3=normal   Femoral 2 2  2=diminished   Popliteal    1=barely palpable   Dorsalis pedis    0=absent   Posterior tibial    4=aneurysmal             Other pertinent information:1. The past medical records were reviewed. 2.  The medical records of her oncologist were reviewed      Assessment:    1.  Infiltrating ductal carcinoma of right breast, stage 1 (HCC)              Plan:       I had a long detailed discussion the patient, all options, risks benefits and alternatives were explained, patient was recommended to proceed with removal of venous port

## 2020-06-25 NOTE — H&P
Chief Complaint:        Chief Complaint   Patient presents with    Consultation       port removal            HPI: Patient came to the office by herself, for preop discussion prior to removal of venous port, that was inserted in May of last year, on the right right side, for the treatment of carcinoma the breast, completed chemotherapy, currently in remission, was recommended removal of venous port by her oncologist Dr. Ronnie Mac  Patient has no major health issues and doing well        Patient denies any focal lateralizing neurological symptoms like loss of speech, vision or loss of function of extremity     Patient can walk a few blocks , and denies any symptoms of rest pain     No Known Allergies     Current Facility-Administered Medications          Current Outpatient Medications   Medication Sig Dispense Refill    anastrozole (ARIMIDEX) 1 MG tablet Take 1 tablet by mouth daily 90 tablet 2    Calcium Carbonate-Vitamin D (OYSTER SHELL CALCIUM/D) 500-200 MG-UNIT TABS Take 1 tablet by mouth daily 30 tablet 3    prochlorperazine (COMPAZINE) 10 MG tablet Take 1 tablet by mouth every 6 hours as needed (nausea) 120 tablet 1    ondansetron (ZOFRAN) 4 MG tablet Take 1 tablet by mouth every 8 hours as needed for Nausea or Vomiting 90 tablet 1      No current facility-administered medications for this visit.                 Facility-Administered Medications Ordered in Other Visits   Medication Dose Route Frequency Provider Last Rate Last Dose    hydrALAZINE (APRESOLINE) 20 MG/ML injection                        Past Medical History        Past Medical History:   Diagnosis Date    Cancer [facility])       malignant lesion in Right breast - for OR 4-18-19     Hyperlipidemia       no medications     Post-operative state 5/30/2019            Past Surgical History         Past Surgical History:   Procedure Laterality Date    BREAST BIOPSY        BREAST BIOPSY Right 4/18/2019     RIGHT PARTIAL MASTECTOMY WITH NEEDLE

## 2020-06-25 NOTE — PROGRESS NOTES
MESSAGE LEFT ON PATIENTS VOICEMAIL REGARDING TIME CHANGE FOR HER PROCEDURE. NOTIFIED TO ARRIVE IN PRE OP AT 0700 FOR 0900 SURGERY WITH DR Janes Dominguez. REQUESTED THAT PATIENT RETURNS OUR CALL.

## 2020-06-26 ENCOUNTER — ANESTHESIA EVENT (OUTPATIENT)
Dept: OPERATING ROOM | Age: 55
End: 2020-06-26
Payer: COMMERCIAL

## 2020-06-26 ENCOUNTER — ANESTHESIA (OUTPATIENT)
Dept: OPERATING ROOM | Age: 55
End: 2020-06-26
Payer: COMMERCIAL

## 2020-06-26 ENCOUNTER — HOSPITAL ENCOUNTER (OUTPATIENT)
Age: 55
Setting detail: OUTPATIENT SURGERY
Discharge: HOME | End: 2020-06-26
Attending: SURGERY | Admitting: SURGERY
Payer: COMMERCIAL

## 2020-06-26 VITALS
RESPIRATION RATE: 14 BRPM | HEIGHT: 63 IN | HEART RATE: 56 BPM | TEMPERATURE: 97 F | WEIGHT: 130 LBS | SYSTOLIC BLOOD PRESSURE: 125 MMHG | OXYGEN SATURATION: 100 % | BODY MASS INDEX: 23.04 KG/M2 | DIASTOLIC BLOOD PRESSURE: 57 MMHG

## 2020-06-26 VITALS — SYSTOLIC BLOOD PRESSURE: 103 MMHG | DIASTOLIC BLOOD PRESSURE: 53 MMHG | OXYGEN SATURATION: 100 %

## 2020-06-26 LAB
ANION GAP SERPL CALCULATED.3IONS-SCNC: 7 MMOL/L (ref 7–16)
B.E.: 3.1 MMOL/L (ref -3–0)
BUN BLDV-MCNC: 14 MG/DL (ref 6–20)
CALCIUM SERPL-MCNC: 9.1 MG/DL (ref 8.6–10.2)
CARDIOPULMONARY BYPASS: NO
CHLORIDE BLD-SCNC: 110 MMOL/L (ref 98–107)
CO2: 25 MMOL/L (ref 22–29)
CREAT SERPL-MCNC: 0.7 MG/DL (ref 0.5–1)
DEVICE: ABNORMAL
GFR AFRICAN AMERICAN: >60
GFR NON-AFRICAN AMERICAN: >60 ML/MIN/1.73
GLUCOSE BLD-MCNC: 85 MG/DL (ref 74–99)
HCO3 ARTERIAL: 29.8 MMOL/L (ref 22–26)
HCT (EST): 39 % (ref 34–48)
HCT VFR BLD CALC: 38.5 % (ref 34–48)
HEMOGLOBIN: 12.1 G/DL (ref 11.5–15.5)
HGB, (EST): 13.4 G/DL (ref 11.5–15.5)
MCH RBC QN AUTO: 30.8 PG (ref 26–35)
MCHC RBC AUTO-ENTMCNC: 31.4 % (ref 32–34.5)
MCV RBC AUTO: 98 FL (ref 80–99.9)
O2 SATURATION: 33.1 % (ref 92–98.5)
OPERATOR ID: ABNORMAL
PCO2 ARTERIAL: 53.7 MMHG (ref 35–45)
PDW BLD-RTO: 13.2 FL (ref 11.5–15)
PH BLOOD GAS: 7.35 (ref 7.35–7.45)
PLATELET # BLD: 257 E9/L (ref 130–450)
PMV BLD AUTO: 10.1 FL (ref 7–12)
PO2 ARTERIAL: 21.8 MMHG (ref 80–100)
POTASSIUM REFLEX MAGNESIUM: 5.5 MMOL/L (ref 3.5–5)
RBC # BLD: 3.93 E12/L (ref 3.5–5.5)
SODIUM BLD-SCNC: 142 MMOL/L (ref 132–146)
SOURCE, BLOOD GAS: ABNORMAL
WBC # BLD: 5.2 E9/L (ref 4.5–11.5)

## 2020-06-26 PROCEDURE — 6360000002 HC RX W HCPCS

## 2020-06-26 PROCEDURE — 3700000001 HC ADD 15 MINUTES (ANESTHESIA): Performed by: SURGERY

## 2020-06-26 PROCEDURE — 7100000011 HC PHASE II RECOVERY - ADDTL 15 MIN: Performed by: SURGERY

## 2020-06-26 PROCEDURE — 85027 COMPLETE CBC AUTOMATED: CPT

## 2020-06-26 PROCEDURE — 2580000003 HC RX 258: Performed by: SURGERY

## 2020-06-26 PROCEDURE — 3700000000 HC ANESTHESIA ATTENDED CARE: Performed by: SURGERY

## 2020-06-26 PROCEDURE — 7100000010 HC PHASE II RECOVERY - FIRST 15 MIN: Performed by: SURGERY

## 2020-06-26 PROCEDURE — 3600000012 HC SURGERY LEVEL 2 ADDTL 15MIN: Performed by: SURGERY

## 2020-06-26 PROCEDURE — 3600000002 HC SURGERY LEVEL 2 BASE: Performed by: SURGERY

## 2020-06-26 PROCEDURE — 36590 REMOVAL TUNNELED CV CATH: CPT | Performed by: SURGERY

## 2020-06-26 PROCEDURE — 2709999900 HC NON-CHARGEABLE SUPPLY: Performed by: SURGERY

## 2020-06-26 PROCEDURE — 36415 COLL VENOUS BLD VENIPUNCTURE: CPT

## 2020-06-26 PROCEDURE — 80048 BASIC METABOLIC PNL TOTAL CA: CPT

## 2020-06-26 PROCEDURE — 82803 BLOOD GASES ANY COMBINATION: CPT

## 2020-06-26 PROCEDURE — 2500000003 HC RX 250 WO HCPCS: Performed by: SURGERY

## 2020-06-26 PROCEDURE — 6360000002 HC RX W HCPCS: Performed by: SURGERY

## 2020-06-26 RX ORDER — CEFAZOLIN SODIUM 2 G/50ML
2 SOLUTION INTRAVENOUS
Status: COMPLETED | OUTPATIENT
Start: 2020-06-26 | End: 2020-06-26

## 2020-06-26 RX ORDER — OXYCODONE HYDROCHLORIDE AND ACETAMINOPHEN 5; 325 MG/1; MG/1
1 TABLET ORAL EVERY 6 HOURS PRN
Qty: 20 TABLET | Refills: 0 | Status: SHIPPED | OUTPATIENT
Start: 2020-06-26 | End: 2020-07-01

## 2020-06-26 RX ORDER — FENTANYL CITRATE 50 UG/ML
INJECTION, SOLUTION INTRAMUSCULAR; INTRAVENOUS PRN
Status: DISCONTINUED | OUTPATIENT
Start: 2020-06-26 | End: 2020-06-26 | Stop reason: SDUPTHER

## 2020-06-26 RX ORDER — SODIUM CHLORIDE 9 MG/ML
INJECTION, SOLUTION INTRAVENOUS CONTINUOUS
Status: DISCONTINUED | OUTPATIENT
Start: 2020-06-26 | End: 2020-06-26 | Stop reason: HOSPADM

## 2020-06-26 RX ORDER — SODIUM CHLORIDE 0.9 % (FLUSH) 0.9 %
10 SYRINGE (ML) INJECTION EVERY 12 HOURS SCHEDULED
Status: DISCONTINUED | OUTPATIENT
Start: 2020-06-26 | End: 2020-06-26 | Stop reason: HOSPADM

## 2020-06-26 RX ORDER — PROPOFOL 10 MG/ML
INJECTION, EMULSION INTRAVENOUS CONTINUOUS PRN
Status: DISCONTINUED | OUTPATIENT
Start: 2020-06-26 | End: 2020-06-26 | Stop reason: SDUPTHER

## 2020-06-26 RX ORDER — MIDAZOLAM HYDROCHLORIDE 1 MG/ML
INJECTION INTRAMUSCULAR; INTRAVENOUS PRN
Status: DISCONTINUED | OUTPATIENT
Start: 2020-06-26 | End: 2020-06-26 | Stop reason: SDUPTHER

## 2020-06-26 RX ORDER — LIDOCAINE HYDROCHLORIDE 10 MG/ML
INJECTION, SOLUTION INFILTRATION; PERINEURAL PRN
Status: DISCONTINUED | OUTPATIENT
Start: 2020-06-26 | End: 2020-06-26 | Stop reason: ALTCHOICE

## 2020-06-26 RX ORDER — SODIUM CHLORIDE 0.9 % (FLUSH) 0.9 %
10 SYRINGE (ML) INJECTION PRN
Status: DISCONTINUED | OUTPATIENT
Start: 2020-06-26 | End: 2020-06-26 | Stop reason: HOSPADM

## 2020-06-26 RX ADMIN — MIDAZOLAM 2 MG: 1 INJECTION INTRAMUSCULAR; INTRAVENOUS at 09:43

## 2020-06-26 RX ADMIN — FENTANYL CITRATE 50 MCG: 50 INJECTION, SOLUTION INTRAMUSCULAR; INTRAVENOUS at 09:55

## 2020-06-26 RX ADMIN — SODIUM CHLORIDE: 9 INJECTION, SOLUTION INTRAVENOUS at 09:42

## 2020-06-26 RX ADMIN — PROPOFOL 75 MCG/KG/MIN: 10 INJECTION, EMULSION INTRAVENOUS at 09:55

## 2020-06-26 RX ADMIN — CEFAZOLIN SODIUM 2 G: 2 SOLUTION INTRAVENOUS at 09:49

## 2020-06-26 RX ADMIN — SODIUM CHLORIDE: 9 INJECTION, SOLUTION INTRAVENOUS at 08:03

## 2020-06-26 ASSESSMENT — PAIN - FUNCTIONAL ASSESSMENT: PAIN_FUNCTIONAL_ASSESSMENT: 0-10

## 2020-06-26 ASSESSMENT — PAIN SCALES - GENERAL
PAINLEVEL_OUTOF10: 0
PAINLEVEL_OUTOF10: 0

## 2020-06-26 NOTE — ANESTHESIA PRE PROCEDURE
Department of Anesthesiology  Preprocedure Note       Name:  Izaiah Woodward   Age:  53 y.o.  :  1965                                          MRN:  [id number]         Date:  2020      Surgeon: Patrick Castro):  Alysha Patino MD    Procedure: PORT REMOVAL (N/A )    Medications prior to admission:   Prior to Admission medications    Medication Sig Start Date End Date Taking? Authorizing Provider   anastrozole (ARIMIDEX) 1 MG tablet Take 1 tablet by mouth daily 19   IFEANYI Arango CNP   Calcium Carbonate-Vitamin D (OYSTER SHELL CALCIUM/D) 500-200 MG-UNIT TABS Take 1 tablet by mouth daily 10/22/19 6/25/20  IFEANYI Arango CNP   prochlorperazine (COMPAZINE) 10 MG tablet Take 1 tablet by mouth every 6 hours as needed (nausea) 19   Raquel Lowry MD   ondansetron (ZOFRAN) 4 MG tablet Take 1 tablet by mouth every 8 hours as needed for Nausea or Vomiting 19   Raquel Lowry MD       Current medications:    No current facility-administered medications for this visit. No current outpatient medications on file.      Facility-Administered Medications Ordered in Other Visits   Medication Dose Route Frequency Provider Last Rate Last Dose    0.9 % sodium chloride infusion   Intravenous Continuous Alysha Patino MD        ceFAZolin (ANCEF) 2 g in dextrose 3 % 50 mL IVPB (duplex)  2 g Intravenous On Call to [address] [place] [address],[address], MD        sodium chloride flush 0.9 % injection 10 mL  10 mL Intravenous 2 times per day Alysha Patino MD        sodium chloride flush 0.9 % injection 10 mL  10 mL Intravenous PRN Alysha Patino MD           Allergies:  No Known Allergies    Problem List:    Patient Active Problem List   Diagnosis Code    Infiltrating ductal carcinoma of right breast, stage 1 ([facility] [address] C50.911       Past Medical History:        Diagnosis Date    Cancer [facility])     malignant lesion in Right breast - for OR -18-19     Hyperlipidemia     no medications    

## 2020-06-26 NOTE — PROGRESS NOTES
Admitted to Same Day Surgery. Preop instructions given to patient. COVID testing completed on: 6/22/2020  Results of the test: not detected   The patient verbally confirms that they did adhere to the self-quarantine guidelines. No signs or symptoms expressed or observed.

## 2020-07-02 ENCOUNTER — OFFICE VISIT (OUTPATIENT)
Dept: VASCULAR SURGERY | Age: 55
End: 2020-07-02

## 2020-07-02 ENCOUNTER — TELEPHONE (OUTPATIENT)
Dept: VASCULAR SURGERY | Age: 55
End: 2020-07-02

## 2020-07-02 VITALS — RESPIRATION RATE: 16 BRPM | HEIGHT: 63 IN | BODY MASS INDEX: 22.68 KG/M2 | WEIGHT: 128 LBS

## 2020-07-02 PROCEDURE — 99024 POSTOP FOLLOW-UP VISIT: CPT | Performed by: SURGERY

## 2020-07-14 ENCOUNTER — OFFICE VISIT (OUTPATIENT)
Dept: ONCOLOGY | Age: 55
End: 2020-07-14
Payer: COMMERCIAL

## 2020-07-14 ENCOUNTER — HOSPITAL ENCOUNTER (OUTPATIENT)
Dept: INFUSION THERAPY | Age: 55
Discharge: HOME | End: 2020-07-14
Payer: COMMERCIAL

## 2020-07-14 VITALS
HEART RATE: 65 BPM | BODY MASS INDEX: 23.07 KG/M2 | HEIGHT: 63 IN | TEMPERATURE: 98.4 F | SYSTOLIC BLOOD PRESSURE: 116 MMHG | OXYGEN SATURATION: 98 % | DIASTOLIC BLOOD PRESSURE: 56 MMHG | WEIGHT: 130.2 LBS

## 2020-07-14 PROCEDURE — 99214 OFFICE O/P EST MOD 30 MIN: CPT | Performed by: NURSE PRACTITIONER

## 2020-07-14 RX ORDER — ANASTROZOLE 1 MG/1
1 TABLET ORAL DAILY
Qty: 90 TABLET | Refills: 2 | Status: SHIPPED
Start: 2020-07-14 | End: 2020-08-28

## 2020-07-14 RX ORDER — ACYCLOVIR 800 MG/1
800 TABLET ORAL EVERY 8 HOURS
COMMUNITY
End: 2020-12-14 | Stop reason: ALTCHOICE

## 2020-07-14 NOTE — PROGRESS NOTES
[address] MED ONCOLOGY  [facility] [address]  Dept: [phone]  Progress Note      Reason for Visit:   Right Breast Cancer. Referring Physician:  Rika Atkinson MD    PCP:  Tyra Foley MD    History of Present Illness: The patient is a 53 y.o. lady with a PMH significant for hyperlipidemia, who had presented with an abnormal screening Mammogram, was done on 3/12/2019, revealing calcifications in the right UOQ, she had a right breast US done on 3/13/58059, no masses were seen, she underwent on 3/29/2019 a stereotactic biopsy of the right breast calcifications. Due to biopsy needle malfunction and lack of tissue obtained,  a clip was not placed at the time of biopsy, path:    Breast, right, site not further specified, core needle biopsy:  Infiltrating ductal carcinoma, moderately differentiated. Ductal carcinoma in situ, comedo type, high nuclear grade. Comment:     Intradepartmental consultation is obtained.  Infiltrating  ductal carcinoma corresponds to architectural grade 3, nuclear grade 2,  mitotic grade 1; overall grade 6/9-moderately differentiated; grade 2. Breast Cancer Marker Studies:  Estrogen Receptors (ER): Positive (80% nuclei staining). Staining intensity = strong. Progesterone Receptors (SC): Positive (5% nuclei staining). Staining intensity = weak. Her-2/dougie (c-erb B-2) protein expression:  Positive (3+). She had bilateral Breast MRI done on 4/4/2019 revealing: There is scattered fibroglandular tissue with minimal background  parenchymal enhancement. An irregular, enhancing mass is noted in the  right breast 10:00 which measures 9 x 6 x 7 mm (image 43 of the axial  T1 postcontrast series). No suspicious mass or non-mass enhancement  seen on the left. There is no lymphadenopathy. Bilateral skin and  nipple areolar complexes are normal. Visualized portions of the chest  and abdomen are unremarkable. Impression  1.  Irregular, enhancing mass in the right breast 10:00 is consistent  with biopsy-proven neoplasm. No additional foci of disease identified  in the right breast.  2. There is no lymphadenopathy. 3. No evidence of neoplasm on the left. RECOMMENDATION:  Continued surgical and oncologic consultation is advised. BIRADS 6: Known neoplasm    The patient underwent on 4/18/2019 a right breast needle localized lumpectomy, with SLN excisional biopsy, path:  CANCER CASE SUMMARY  Procedure: Excision (less than total mastectomy; needle localization  partial mastectomy [lumpectomy]). Specimen laterality: Right. Tumor site: Not specified. Tumor size: 0.2 cm (invasive component). Histologic type: Invasive ductal carcinoma. Histologic grade: Glandular/tubular differentiation-score = 3; nuclear  pleomorphism-score = 2; mitotic rate-score = 1.  Overall grade 2;  6/9-moderately differentiated. Tumor focality: Single focus of invasive carcinoma. Ductal carcinoma in situ: Present. Size/extent of DCIS: 0.7 cm.  DCIS completely surrounds a small, residual  focus of infiltrating ductal carcinoma in the current specimen. Architectural pattern: Comedo type. Nuclear grade: Grade III (high nuclear grade). Necrosis: Present, Central (expansive \"comedo\" necrosis). Microcalcifications: Present. Margins: Invasive carcinoma margins-uninvolved by invasive carcinoma. Distance from closest margin-0.9 cm (inferior inked margin). DCIS margins-uninvolved by DCIS.  Distance from closest margin-0.3 cm  (anterior inked margin of resection-specimen A). Note: Specimen C  represents additional anterior/superior breast tissue [3.0 x 2.5 x 1.0  cm] which represents larger final margin of resection for DCIS with  regard to anterior margin of resection). Regional lymph nodes: Uninvolved by tumor cells.  Number of lymph nodes  examined-4 (designated sentinel lymph nodes).  Number of lymph nodes  involved-0.   Treatment effect: No known presurgical therapy. Pathologic stage: pT1a-0.2 cm maximum dimension. Regional lymph nodes: (sn) pN0-no regional lymph node metastasis. Distant metastasis: pMX-cannot be assessed. Ancillary studies: Performed on the patient's previous biopsy specimen  (BK-; 3/29/2019). Microcalcifications: Present in DCIS. Additional pathologic findings: Fibrocystic change.  Previous  biopsy/excision site. The patient has a small tumor, HER-2/dougie and hormone receptor positive, given her young age and excellent health, she was not comfortable with adjuvant treatment with only hormonal therapy. The patient was started on adjuvant chemotherapy with Taxol and Herceptin on 5/28/2019. She completed adjuvant RT on 10/10/2019. The patient was started on adjuvant endocrine therapy with Arimidex on 10/20/2019. She completed herceptin on 06/02/202. She presents today 07/14/2020 for f/u. Continues to tolerate Arimidex well. Denies fever, chills, chest pain, SOB, nausea, vomiting, or diarrhea. MediPort removed on 06/26/2020. Continue Arimidex and Ca/VitD. Review of Systems;  CONSTITUTIONAL: No fever, chills. Good appetite and energy level. ENMT: Eyes: No diplopia; Nose: No epistaxis. Mouth: No sore throat. RESPIRATORY: No hemoptysis, shortness of breath, cough. CARDIOVASCULAR: No chest pain, palpitations. GASTROINTESTINAL: No nausea/vomiting, abdominal pain, diarrhea/constipation. GENITOURINARY: No dysuria, urinary frequency, hematuria. NEURO: No syncope, presyncope, headache. MSK: she has chronic pain in the back and right hip.   Remainder:  ROS NEGATIVE    Past Medical History:      Diagnosis Date    Cancer [facility])     malignant lesion in Right breast - for OR 4-18-19     Hyperlipidemia     no medications     Post-operative state 5/30/2019     Patient Active Problem List   Diagnosis    Infiltrating ductal carcinoma of right breast, stage 1 (HCC)    Post-operative state        Past Surgical History:      Procedure Emotionally abused: No     Physically abused: No     Forced sexual activity: No   Other Topics Concern    Not on file   Social History Narrative    Not on file       Allergies:  No Known Allergies     OB/GYN:  The patient is , she had her menarche at the age of 9-12, she had a hysterectomy, no oophorectomies, 1st live birth was at 20, she used OCPs for about 10 years. Physical Exam:  BP (!) 116/56   Pulse 65   Temp 98.4 °F (36.9 °C)   Ht 5' 3\" (1.6 m)   Wt 130 lb 3.2 oz (59.1 kg)   SpO2 98%   BMI 23.06 kg/m²     GENERAL: Alert, oriented x 3, not in acute distress. HEENT: PERRLA; EOMI. Oropharynx clear. NECK: Supple. No palpable cervical or supraclavicular lymphadenopathy. LUNGS: Good air entry bilaterally. No wheezing, crackles or rhonchi. CHEST: s/p right sided port removal   CARDIOVASCULAR: Regular rate. No murmurs, rubs or gallops. ABDOMEN: Soft. Non-tender, non-distended. Positive bowel sounds. EXTREMITIES: Without clubbing, cyanosis, or edema. NEUROLOGIC: No focal deficits. ECOG PS 0    Impression/Plan:      The patient is a 53 y.o. postmenopausal lady with a PMH significant for hyperlipidemia, who had presented with an abnormal screening Mammogram, she was diagnosed with a right breast invasive ductal carcinoma, grade 2, measuring 9 mm, ER positive, 80% UT +5% HER-2/dougie positive, 3+ by IHC. Patient with clinical stage [id number] disease, she underwent on 2019 a right breast needle localized lumpectomy, with SLN excisional biopsy, tumor size is 2mm, grade 2, with associated DCIS, measuring 7mm, high grade, comedo type with central necrosis, margins are negative, 4 SN were removed, they were all neg for metastatic disease, final pathologic stage pT1a(sn)pN0M0, prognostic stage IA disease. I discussed with the patient and her spouse pathology results, stage and prognosis, adjuvant RT and adjuvant ET with an AI are recommended.  The patient has a small Her2 pos disease (<5mm), I reviewed with her the nccn guidelines and the risks of the  Chemotherapy, she is young and healthy, she is very nervous about the diagnosis of breast cancer, she is very well informed about her disease, she prefers to be treated, JAGDISH SHAFER was originally planned, but given the size of the tumor (<5mm), decided to treat with Taxol (weekly for 12 weeks) with herceptin, the side effects of the treatment including were discussed with the patient, 2D echo has been done LVEF is adequate for treatment with Herceptin. Patient with a personal history of breast cancer, family history is positive for breast and ovarian cancer, testing for HBOC is recommended, MyRisk testing was done, neg for clinically significant mutations. She was started adjuvant chemotherapy with Taxol and Herceptin on 5/28/2019. Completed Taxol on 8/20/2019. Labs reviewed, proceed with single agent Herceptin today 9/10/2019. Mild macrocytosis, ordered by B-12 and folate, B12 is borderline, ordered MMA, it is not elevated, antiparietal cells and intrinsic factor antibodies done to evaluate for pernicious anemia and are negative, she was started on parenteral vitamin B12 by her PCP. She had a 2D echocardiogram done on 8/19/2019, revealing an overall stable LVEF, 55 to 60%. RT started 09/13/2019 and was completed on 10/10/2019. D[facility]A scan was done [facility] on 10/18/2019 revealing normal bone density to the LS and paulo hips. She was started on adjuvant endocrine therapy with Arimidex on 10/22/2019,she is tolerating it well overall    2D-Echo done on 02/10/2020 revealed LVEF 60%. 2D echocardiogram was done on 5/7/2020, revealing an LVEF of 55-65%. Herceptin given on 05/12/2020. Bilateral screening mammogram on 05/04/2020, was negative. She completed Herceptin on 06/02/2020. Bilateral breast MRI 06/09/2020: no evidence of malignancy. MediPort removed on 06/26/2020. Continue Arimidex and Ca/VitD. RTC in 3 months    IFEANYI Calvillo [facility]  MEDICAL ONCOLOGY  [address] [place] [id number]  Dept: [phone]  July 14, 2020

## 2020-08-01 PROBLEM — Z98.890 POST-OPERATIVE STATE: Status: RESOLVED | Noted: 2019-05-30 | Resolved: 2020-08-01

## 2020-08-28 RX ORDER — ANASTROZOLE 1 MG/1
1 TABLET ORAL DAILY
Qty: 90 TABLET | Refills: 1 | Status: SHIPPED | OUTPATIENT
Start: 2020-08-28 | End: 2020-10-13 | Stop reason: SDUPTHER

## 2020-08-28 RX ORDER — ANASTROZOLE 1 MG/1
1 TABLET ORAL DAILY
Qty: 30 TABLET | Refills: 3 | Status: SHIPPED | OUTPATIENT
Start: 2020-08-28 | End: 2020-08-28 | Stop reason: SDUPTHER

## 2020-08-28 NOTE — TELEPHONE ENCOUNTER
Patient called requesting refill on her Arimidex be sent to the [facility] pharmacy in [facility]. RX pended for signature.

## 2020-10-12 NOTE — PROGRESS NOTES
[facility]  [address] [place] [id number]  Dept: [phone]  Attending Progress Note      Reason for Visit:   Right Breast Cancer. Referring Physician:  Tori Moncada MD    PCP:  Artemio Soria MD    History of Present Illness: The patient is a 63 y.o. lady with a PMH significant for hyperlipidemia, who had presented with an abnormal screening Mammogram, was done on 3/12/2019, revealing calcifications in the right UOQ, she had a right breast US done on 3/13/75967, no masses were seen, she underwent on 3/29/2019 a stereotactic biopsy of the right breast calcifications. Due to biopsy needle malfunction and lack of tissue obtained,  a clip was not placed at the time of biopsy, path:    Breast, right, site not further specified, core needle biopsy:  Infiltrating ductal carcinoma, moderately differentiated. Ductal carcinoma in situ, comedo type, high nuclear grade. Comment:     Intradepartmental consultation is obtained.  Infiltrating  ductal carcinoma corresponds to architectural grade 3, nuclear grade 2,  mitotic grade 1; overall grade 6/9-moderately differentiated; grade 2. Breast Cancer Marker Studies:  Estrogen Receptors (ER): Positive (80% nuclei staining). Staining intensity = strong. Progesterone Receptors (MD): Positive (5% nuclei staining). Staining intensity = weak. Her-2/dougie (c-erb B-2) protein expression:  Positive (3+). She had bilateral Breast MRI done on 4/4/2019 revealing: There is scattered fibroglandular tissue with minimal background  parenchymal enhancement. An irregular, enhancing mass is noted in the  right breast 10:00 which measures 9 x 6 x 7 mm (image 43 of the axial  T1 postcontrast series). No suspicious mass or non-mass enhancement  seen on the left. There is no lymphadenopathy. Bilateral skin and  nipple areolar complexes are normal. Visualized portions of the chest  and abdomen are unremarkable. Impression  1. Irregular, enhancing mass in the right breast 10:00 is consistent  with biopsy-proven neoplasm. No additional foci of disease identified  in the right breast.  2. There is no lymphadenopathy. 3. No evidence of neoplasm on the left. RECOMMENDATION:  Continued surgical and oncologic consultation is advised. BIRADS 6: Known neoplasm    The patient underwent on 4/18/2019 a right breast needle localized lumpectomy, with SLN excisional biopsy, path:  CANCER CASE SUMMARY  Procedure: Excision (less than total mastectomy; needle localization  partial mastectomy [lumpectomy]). Specimen laterality: Right. Tumor site: Not specified. Tumor size: 0.2 cm (invasive component). Histologic type: Invasive ductal carcinoma. Histologic grade: Glandular/tubular differentiation-score = 3; nuclear  pleomorphism-score = 2; mitotic rate-score = 1.  Overall grade 2;  6/9-moderately differentiated. Tumor focality: Single focus of invasive carcinoma. Ductal carcinoma in situ: Present. Size/extent of DCIS: 0.7 cm.  DCIS completely surrounds a small, residual  focus of infiltrating ductal carcinoma in the current specimen. Architectural pattern: Comedo type. Nuclear grade: Grade III (high nuclear grade). Necrosis: Present, Central (expansive \"comedo\" necrosis). Microcalcifications: Present. Margins: Invasive carcinoma margins-uninvolved by invasive carcinoma. Distance from closest margin-0.9 cm (inferior inked margin). DCIS margins-uninvolved by DCIS.  Distance from closest margin-0.3 cm  (anterior inked margin of resection-specimen A). Note: Specimen C  represents additional anterior/superior breast tissue [3.0 x 2.5 x 1.0  cm] which represents larger final margin of resection for DCIS with  regard to anterior margin of resection). Regional lymph nodes: Uninvolved by tumor cells.  Number of lymph nodes  examined-4 (designated sentinel lymph nodes).  Number of lymph nodes  involved-0.   Treatment effect: No known presurgical current or ex partner: No     Emotionally abused: No     Physically abused: No     Forced sexual activity: No   Other Topics Concern    Not on file   Social History Narrative    Not on file       Allergies:  No Known Allergies     OB/GYN:  The patient is , she had her menarche at the age of 9-12, she had a hysterectomy, no oophorectomies, 1st live birth was at 20, she used OCPs for about 10 years. Physical Exam:  Blood pressure (!) 123/56, pulse 70, temperature 98.3 °F (36.8 °C), height 5' 3\" (1.6 m), weight 133 lb 11.2 oz (60.6 kg), SpO2 98 %. GENERAL: Alert, oriented x 3, not in acute distress. HEENT: PERRLA; EOMI. Oropharynx clear. NECK: Supple. No palpable cervical or supraclavicular lymphadenopathy. LUNGS: Good air entry bilaterally. No wheezing, crackles or rhonchi. CHEST: right sided port. CARDIOVASCULAR: Regular rate. No murmurs, rubs or gallops. ABDOMEN: Soft. Non-tender, non-distended. Positive bowel sounds. EXTREMITIES: Without clubbing, cyanosis, or edema. NEUROLOGIC: No focal deficits. ECOG PS 0    Impression/Plan:      The patient is a 63 y.o. postmenopausal lady with a PMH significant for hyperlipidemia, who had presented with an abnormal screening Mammogram, she was diagnosed with a right breast invasive ductal carcinoma, grade 2, measuring 9 mm, ER positive, 80% OK +5% HER-2/dougie positive, 3+ by IHC. Patient with clinical stage [id number] disease, she underwent on 2019 a right breast needle localized lumpectomy, with SLN excisional biopsy, tumor size is 2mm, grade 2, with associated DCIS, measuring 7mm, high grade, comedo type with central necrosis, margins are negative, 4 SN were removed, they were all neg for metastatic disease, final pathologic stage pT1a(sn)pN0M0, prognostic stage IA disease. I discussed with the patient and her spouse pathology results, stage and prognosis, adjuvant RT and adjuvant ET with an AI are recommended.  The patient has a small Her2 pos disease (<5mm), I reviewed with her the nccn guidelines and the risks of the  Chemotherapy, she is young and healthy, she is very nervous about the diagnosis of breast cancer, she is very well informed about her disease, she prefers to be treated, JAGDISH SHAFER was originally planned, but given the size of the tumor (<5mm), decided to treat with Taxol (weekly for 12 weeks) with herceptin, the side effects of the treatment including were discussed with the patient, 2D echo has been done LVEF is adequate for treatment with Herceptin. Patient with a personal history of breast cancer, family history is positive for breast and ovarian cancer, testing for HBOC is recommended, MyRisk testing was done, neg for clinically significant mutations. The patient was started on adjuvant chemotherapy with Taxol and Herceptin on 5/28/2019, she completed Taxol on 8/20/2019, completed Herceptin on 6/2/2020. Mild macrocytosis, ordered by B-12 and folate, B12 is borderline, ordered MMA, it is not elevated, antiparietal cells and intrinsic factor antibodies done to evaluate for pernicious anemia and are negative, she was started on parenteral vitamin B12 by her PCP. She was started on adjuvant endocrine therapy with Arimidex on 10/22/2019,she is tolerating it well overall. Continue Arimidex. I discussed with the patient the importance of monthly BSE, and routine screening mammograms. Bilateral screening mammogram on 05/04/2020, was negative. She will be due for bilateral mammogram again on 5/5/2021. Continue breast massage. DEXA scan was done at [facility] on 10/18/2019 revealing normal bone density to the LS and paulo hips. Repeat DEXA scan has been ordered, she will have it done at [facility]    The surveillance guidelines were reviewed with the patient, continue with surveillance. RTC in 3 months. Thank you for allowing us to participate in the care of Mrs. Quintanilla.     Jennifer Navas MD

## 2020-10-13 ENCOUNTER — OFFICE VISIT (OUTPATIENT)
Dept: ONCOLOGY | Age: 55
End: 2020-10-13
Payer: COMMERCIAL

## 2020-10-13 ENCOUNTER — HOSPITAL ENCOUNTER (OUTPATIENT)
Dept: INFUSION THERAPY | Age: 55
Discharge: HOME | End: 2020-10-13
Payer: COMMERCIAL

## 2020-10-13 VITALS
OXYGEN SATURATION: 98 % | TEMPERATURE: 98.3 F | BODY MASS INDEX: 23.69 KG/M2 | SYSTOLIC BLOOD PRESSURE: 123 MMHG | WEIGHT: 133.7 LBS | DIASTOLIC BLOOD PRESSURE: 56 MMHG | HEIGHT: 63 IN | HEART RATE: 70 BPM

## 2020-10-13 PROCEDURE — 99214 OFFICE O/P EST MOD 30 MIN: CPT | Performed by: INTERNAL MEDICINE

## 2020-10-13 PROCEDURE — 99212 OFFICE O/P EST SF 10 MIN: CPT

## 2020-10-13 RX ORDER — ANASTROZOLE 1 MG/1
1 TABLET ORAL DAILY
Qty: 90 TABLET | Refills: 3 | Status: SHIPPED
Start: 2020-10-13 | End: 2021-01-22 | Stop reason: SDUPTHER

## 2020-11-02 ENCOUNTER — TELEPHONE (OUTPATIENT)
Dept: CASE MANAGEMENT | Age: 55
End: 2020-11-02

## 2020-11-02 NOTE — TELEPHONE ENCOUNTER
Due to COVID-19 missed opportunity to meet with patient at visit scheduled with medical oncology. Patient encouraged to sufficiently review the details of her Survivorship Care Plan and Clinical treatment summary that were mailed to the patient. I also provided patient a copy of [facility] life after treatment, Sofy's sisters, [employer] program, [facility], and my business card. Patient encouraged to call with questions regarding their survivorship care plan/Clinical treatment summary. A copy was sent to patient's PCP, Dr. Azeem Fuller. I will discharge patient from a navigation standpoint.

## 2020-12-14 ENCOUNTER — HOSPITAL ENCOUNTER (OUTPATIENT)
Dept: RADIATION ONCOLOGY | Age: 55
Discharge: HOME | End: 2020-12-14
Attending: RADIOLOGY
Payer: COMMERCIAL

## 2020-12-14 VITALS
DIASTOLIC BLOOD PRESSURE: 68 MMHG | HEART RATE: 60 BPM | SYSTOLIC BLOOD PRESSURE: 106 MMHG | OXYGEN SATURATION: 98 % | BODY MASS INDEX: 23.57 KG/M2 | HEIGHT: 63 IN | RESPIRATION RATE: 18 BRPM | WEIGHT: 133 LBS | TEMPERATURE: 97.4 F

## 2020-12-14 PROCEDURE — 99212 OFFICE O/P EST SF 10 MIN: CPT

## 2020-12-14 PROCEDURE — 99213 OFFICE O/P EST LOW 20 MIN: CPT | Performed by: NURSE PRACTITIONER

## 2020-12-14 NOTE — PROGRESS NOTES
Carolina Ta  12/14/2020  9:12 AM      Vitals:    12/14/20 0904   BP: 106/68   Pulse: 60   Resp: 18   Temp: 97.4 °F (36.3 °C)   SpO2: 98%    : Wt Readings from Last 3 Encounters:   12/14/20 133 lb (60.3 kg)   10/13/20 133 lb 11.2 oz (60.6 kg)   07/14/20 130 lb 3.2 oz (59.1 kg)                Current Outpatient Medications:     anastrozole (ARIMIDEX) 1 MG tablet, Take 1 tablet by mouth daily, Disp: 90 tablet, Rfl: 3    Calcium Carbonate-Vitamin D (OYSTER SHELL CALCIUM/D) 500-200 MG-UNIT TABS, Take 1 tablet by mouth daily, Disp: 30 tablet, Rfl: 3    prochlorperazine (COMPAZINE) 10 MG tablet, Take 1 tablet by mouth every 6 hours as needed (nausea), Disp: 120 tablet, Rfl: 1    ondansetron (ZOFRAN) 4 MG tablet, Take 1 tablet by mouth every 8 hours as needed for Nausea or Vomiting, Disp: 90 tablet, Rfl: 1      Patient is seen today in follow up for Right Breast cancer        FALLS RISK SCREENING ASSESSMENT    Instructions:  Assess the patient and enter the appropriate indicators that are present for fall risk identification. Total the numbers entered and assign a fall risk score from Table 2.  Reassess patient at a minimum every 12 weeks or with status change. Assessment   Date  12/14/2020     1. Mental Ability: confusion/cognitively impaired No - 0       2. Elimination Issues: incontinence, frequency No - 0       3. Ambulatory: use of assistive devices (walker, cane, off-loading devices), attached to equipment (IV pole, oxygen) No - 0     4. Sensory Limitations: dizziness, vertigo, impaired vision No - 0       5. Age Less than 65 years - 0       6. Medication: diuretics, strong analgesics, hypnotics, sedatives, antihypertensive agents   No - 0   7. Falls:  recent history of falls within the last 3 months (not to include slipping or tripping)   No - 0   TOTAL 0    If score of 4 or greater was education given? No       TABLE 2   Risk Score Risk Level Plan of Care   0-3 Little or  No Risk 1.   Provide neck cancer? 0- No                                                                                    TOTAL 0          Score of 0-1: No action  Score 2 or greater:    · For Non-Diabetic Patient: Recommend adding Ensure Complete 2 x daily and provide patient with Ensure wellness bag with coupons  · For Diabetic Patient: Recommend adding Glucerna Shake 2 x daily and provide patient with Glucerna Wellness bag with coupons  · Route to the dietitian via Shanice Lynn is a very pleasant 63years old female, she is here today for a 10 month follow up after completed RT to rightt breast 16 fractions, 4256 cGy from 9/19/2019 to 1010/2019. She is alert and oriented x 3, ambulatory without any assistance, denies pain. She's on Arimidex tolerating side effects well, such as hot flashes. She states she was having UTI off and on sometime in July. She also states she had some kidney stones which already resolved. She follows with Dr. Kim Cary oncology her next appointment is 01/22/2021. No other complaints. Katia Persaud, CNP updated.

## 2020-12-14 NOTE — PROGRESS NOTES
recently have some dental work (root canal later tooth extraction) and is finishing the last couple days of prescription antibiotic Amoxicillin. Patient is following with:  Ilene Calhoun     Past Medical History:   Diagnosis Date    Cancer [facility])     malignant lesion in Right breast - for OR 4-18-19     Hyperlipidemia     no medications     Post-operative state 5/30/2019         Past Surgical History:   Procedure Laterality Date    BREAST BIOPSY      BREAST BIOPSY Right 4/18/2019    RIGHT PARTIAL MASTECTOMY WITH NEEDLE LOCALIZATION AND SENTINEL NODE performed by Demi Gipson MD at [facility] / REMOVAL / REPLACEMENT VENOUS ACCESS CATHETER N/A 5/17/2019    PORT INSERTION performed by Comer Goltz, MD at [facility] N/A 6/26/2020    PORT REMOVAL performed by Comer Goltz, MD at [facility] History     Socioeconomic History    Marital status:      Spouse name: Ruthy Najera Number of children: 4    Years of education: 18     Highest education level: Bachelor's degree (e.g., BA, AB, BS)   Occupational History    Occupation: lab technican     Employer: [address] [place] [address] Financial resource strain: Not hard at all    Food insecurity     Worry: Never true     Inability: Never true   Deeplink needs     Medical: No     Non-medical: No   Tobacco Use    Smoking status: Never Smoker    Smokeless tobacco: Never Used   Substance and Sexual Activity    Alcohol use: Not Currently    Drug use: Never    Sexual activity: Yes     Partners: Male   Lifestyle    Physical activity     Days per week: 4 days     Minutes per session: 40 min    Stress:  To some extent   Relationships    Social connections     Talks on phone: More than three times a week     Gets together: More than three times a week     Attends Christian service: 1 to 4 times per year     Active member of club or organization: No     Attends meetings of clubs or organizations: Never     Relationship status:     Intimate partner violence     Fear of current or ex partner: No     Emotionally abused: No     Physically abused: No     Forced sexual activity: No   Other Topics Concern    Not on file   Social History Narrative    Not on file         Family History   Problem Relation Age of Onset    Breast Cancer Mother     Cancer Father         esophageal, stomach    Cancer Paternal Grandfather         colon       Allergies:   Patient has no known allergies. Current Outpatient Medications   Medication Sig Dispense Refill    anastrozole (ARIMIDEX) 1 MG tablet Take 1 tablet by mouth daily 90 tablet 3    prochlorperazine (COMPAZINE) 10 MG tablet Take 1 tablet by mouth every 6 hours as needed (nausea) 120 tablet 1    ondansetron (ZOFRAN) 4 MG tablet Take 1 tablet by mouth every 8 hours as needed for Nausea or Vomiting 90 tablet 1    Calcium Carbonate-Vitamin D (OYSTER SHELL CALCIUM/D) 500-200 MG-UNIT TABS Take 1 tablet by mouth daily 30 tablet 3     No current facility-administered medications for this encounter. REVIEW OF SYSTEMS:    Constitutional:  No fever, chills or rigors. Eyes: No changes in vision, discharge, or pain  ENT: No headaches, hearing loss or vertigo. No mouth sores or sore throat. No change in taste or smell. Cardiovascular: No chest discomfort, dyspnea on exertion, palpitations or loss of consciousness. Respiratory: No mucous productive coughing, no hemoptysis. No wheezing or pleuritic pain. Gastrointestinal: No abdominal pain, appetite loss, blood in stools. No change in bowel habits. No hematemesis   Genitourinary: No dysuria, trouble voiding, or hematuria. No increased frequency. Musculoskeletal: No gait disturbance, weakness or joint complaints. Integumentary: No rash or pruritis.   Neurological: No headache, diplopia, change in muscle strength, numbness or tingling. No change in gait, balance, coordination, mood, affect, memory, mentation, behavior. Psychiatric: No anxiety, or depression. Endocrine: No temperature intolerance. No excessive thirst, fluid intake, or urination. No tremor. Hematologic/Lymphatic: No abnormal bruising or bleeding, blood clots or swollen lymph nodes. Allergic/Immunologic: No nasal congestion or hives. PHYSICAL EXAMINATION:   Vitals:    12/14/20 0904   BP: 106/68   Site: Left Upper Arm   Position: Sitting   Cuff Size: Medium Adult   Pulse: 60   Resp: 18   Temp: 97.4 °F (36.3 °C)   TempSrc: Temporal   SpO2: 98%   Weight: 133 lb (60.3 kg)   Height: 5' 3\" (1.6 m)       Body mass index is 23.56 kg/m². Appearance: Well-developed, well-nourished 63year old female. Skin: Irradiated skin intact, no erythema. Head: Normocephalic, atraumatic  Eyes: EOMI, no conjunctival erythema  ENMT: No pharyngeal erythema, MMM, no rhinorrhea. Neck: Supple, no elevated JVP  Lungs: Clear to auscultation bilaterally. No wheezes, rales or rhonchi. Cardiac: Regular rate and rhythm, +S1S2, no murmurs apparent  Abdomen: Soft, round and non-tender. Bowel sounds present x 4. Extremities: Moves all extremities x 4, no lower extremity edema  Neurologic: Alert and oriented x 3. No focal motor deficits apparent         IMAGING:    No new imaging. ASSESSMENT/PLAN:    Right breast IDC. S/p BCS. ER/NC+ HER2+. The patient completed a course of radiation therapy directed to the right breast on 10/10/2019. Mammogram 05/04/2020 + Breast MRI 06/09/2020 showed no evidence of malignancy. Skin care reviewed. Encouraged to continue routine SBE and report any new findings to a healthcare provider. Continue AI therapy as directed per Medical Oncology. Ongoing Oncology follow-up and surveillance per Medical Oncology. See Radiation Oncology on as needed basis.      Instructed to follow up with other physicians involved in her care as directed (including but not limited to Medical Oncology, Primary Care and Breast Surgery). The patient was given our contact number in the event that if at any time they change their mind and would like to return to the clinic to see either myself or one of the Radiation Oncologists, they can simply call us and we would be happy to see them. Thank you for involving us in the management of this extremely pleasant patient. More than 25 min was in direct contact with pt coordinating/giving care. >50% of the visit was spent in counseling the pt on the following: Follow up care    The nurses notes were reviewed and incorporated into this assessment and plan.       Enrico Alfonso, MSN, APRN-CNP  Certified Nurse Practitioner for [address]  Phone: [phone]: [phone]

## 2021-01-22 ENCOUNTER — HOSPITAL ENCOUNTER (OUTPATIENT)
Dept: INFUSION THERAPY | Age: 56
Discharge: HOME | End: 2021-01-22
Payer: COMMERCIAL

## 2021-01-22 ENCOUNTER — OFFICE VISIT (OUTPATIENT)
Dept: ONCOLOGY | Age: 56
End: 2021-01-22
Payer: COMMERCIAL

## 2021-01-22 VITALS
HEART RATE: 74 BPM | DIASTOLIC BLOOD PRESSURE: 56 MMHG | SYSTOLIC BLOOD PRESSURE: 123 MMHG | HEIGHT: 63 IN | BODY MASS INDEX: 23.42 KG/M2 | TEMPERATURE: 98.8 F | WEIGHT: 132.2 LBS | OXYGEN SATURATION: 98 %

## 2021-01-22 DIAGNOSIS — C50.911 INFILTRATING DUCTAL CARCINOMA OF RIGHT BREAST, STAGE 1 (HCC): Primary | ICD-10-CM

## 2021-01-22 DIAGNOSIS — Z12.31 SCREENING MAMMOGRAM FOR HIGH-RISK PATIENT: Primary | ICD-10-CM

## 2021-01-22 DIAGNOSIS — C50.911 INFILTRATING DUCTAL CARCINOMA OF RIGHT BREAST, STAGE 1 (HCC): ICD-10-CM

## 2021-01-22 PROCEDURE — 99214 OFFICE O/P EST MOD 30 MIN: CPT | Performed by: INTERNAL MEDICINE

## 2021-01-22 PROCEDURE — 99213 OFFICE O/P EST LOW 20 MIN: CPT

## 2021-01-22 RX ORDER — ANASTROZOLE 1 MG/1
1 TABLET ORAL DAILY
Qty: 90 TABLET | Refills: 3 | Status: SHIPPED
Start: 2021-01-22 | End: 2021-09-07 | Stop reason: SDUPTHER

## 2021-01-22 NOTE — PROGRESS NOTES
[address] MED ONCOLOGY  [address] [facility] [address]  Dept: [phone]  Attending Progress Note      Reason for Visit:   Right Breast Cancer. Referring Physician:  Tod Strong MD    PCP:  Rhina Tai MD    History of Present Illness: The patient is a 63 y.o. lady with a PMH significant for hyperlipidemia, who had presented with an abnormal screening Mammogram, was done on 3/12/2019, revealing calcifications in the right UOQ, she had a right breast US done on 3/13/64376, no masses were seen, she underwent on 3/29/2019 a stereotactic biopsy of the right breast calcifications. Due to biopsy needle malfunction and lack of tissue obtained,  a clip was not placed at the time of biopsy, path:    Breast, right, site not further specified, core needle biopsy:  Infiltrating ductal carcinoma, moderately differentiated. Ductal carcinoma in situ, comedo type, high nuclear grade. Comment:     Intradepartmental consultation is obtained.  Infiltrating  ductal carcinoma corresponds to architectural grade 3, nuclear grade 2,  mitotic grade 1; overall grade 6/9-moderately differentiated; grade 2. Breast Cancer Marker Studies:  Estrogen Receptors (ER): Positive (80% nuclei staining). Staining intensity = strong. Progesterone Receptors (MD): Positive (5% nuclei staining). Staining intensity = weak. Her-2/dougie (c-erb B-2) protein expression:  Positive (3+). She had bilateral Breast MRI done on 4/4/2019 revealing: There is scattered fibroglandular tissue with minimal background  parenchymal enhancement. An irregular, enhancing mass is noted in the  right breast 10:00 which measures 9 x 6 x 7 mm (image 43 of the axial  T1 postcontrast series). No suspicious mass or non-mass enhancement  seen on the left. There is no lymphadenopathy. Bilateral skin and  nipple areolar complexes are normal. Visualized portions of the chest  and abdomen are unremarkable. Impression  1. Irregular, enhancing mass in the right breast 10:00 is consistent  with biopsy-proven neoplasm. No additional foci of disease identified  in the right breast.  2. There is no lymphadenopathy. 3. No evidence of neoplasm on the left. RECOMMENDATION:  Continued surgical and oncologic consultation is advised. BIRADS 6: Known neoplasm    The patient underwent on 4/18/2019 a right breast needle localized lumpectomy, with SLN excisional biopsy, path:  CANCER CASE SUMMARY  Procedure: Excision (less than total mastectomy; needle localization  partial mastectomy [lumpectomy]). Specimen laterality: Right. Tumor site: Not specified. Tumor size: 0.2 cm (invasive component). Histologic type: Invasive ductal carcinoma. Histologic grade: Glandular/tubular differentiation-score = 3; nuclear  pleomorphism-score = 2; mitotic rate-score = 1.  Overall grade 2;  6/9-moderately differentiated. Tumor focality: Single focus of invasive carcinoma. Ductal carcinoma in situ: Present. Size/extent of DCIS: 0.7 cm.  DCIS completely surrounds a small, residual  focus of infiltrating ductal carcinoma in the current specimen. Architectural pattern: Comedo type. Nuclear grade: Grade III (high nuclear grade). Necrosis: Present, Central (expansive \"comedo\" necrosis). Microcalcifications: Present. Margins: Invasive carcinoma margins-uninvolved by invasive carcinoma. Distance from closest margin-0.9 cm (inferior inked margin). DCIS margins-uninvolved by DCIS.  Distance from closest margin-0.3 cm  (anterior inked margin of resection-specimen A). Note: Specimen C  represents additional anterior/superior breast tissue [3.0 x 2.5 x 1.0  cm] which represents larger final margin of resection for DCIS with  regard to anterior margin of resection). Regional lymph nodes: Uninvolved by tumor cells.  Number of lymph nodes  examined-4 (designated sentinel lymph nodes).  Number of lymph nodes  involved-0.   Treatment effect: No known presurgical therapy. Pathologic stage: pT1a-0.2 cm maximum dimension. Regional lymph nodes: (sn) pN0-no regional lymph node metastasis. Distant metastasis: pMX-cannot be assessed. Ancillary studies: Performed on the patient's previous biopsy specimen  (LRX-; 3/29/2019). Microcalcifications: Present in DCIS. Additional pathologic findings: Fibrocystic change.  Previous  biopsy/excision site. The patient has a small tumor, HER-2/dougie and hormone receptor positive, given her young age and excellent health, she was not comfortable with adjuvant treatment with only hormonal therapy. The patient was started on adjuvant chemotherapy with Taxol and Herceptin on 5/28/2019, she completed Taxol on 8/20/2019, completed Herceptin on 6/2/2020, she completed adjuvant RT on 10/10/2019. The patient was started on adjuvant endocrine therapy with Arimidex on 10/22/2019. The patient returns to the office for a follow-up visit. She had recurrent UTIs and kidney stones, she stopped taking the calcium. Otherwise she is doing well, sometimes she has pain in the shins and arms, is not persistent. Review of Systems;  CONSTITUTIONAL: No fever, chills. Good appetite and energy level. ENMT: Eyes: No diplopia; Nose: No epistaxis. Mouth: No sore throat. RESPIRATORY: No hemoptysis, shortness of breath, cough. CARDIOVASCULAR: No chest pain, palpitations. GASTROINTESTINAL: No nausea/vomiting, abdominal pain, diarrhea/constipation. GENITOURINARY: No dysuria, urinary frequency, hematuria. NEURO: No syncope, presyncope, headache. MSK: she has chronic pain in the back and right hip.   Remainder:  ROS NEGATIVE    Past Medical History:      Diagnosis Date    Cancer [facility])     malignant lesion in Right breast - for OR 4-18-19     Hyperlipidemia     no medications     Post-operative state 5/30/2019     Patient Active Problem List   Diagnosis    Infiltrating ductal carcinoma of right breast, stage 1 [facility])        Past Surgical History:      Procedure Laterality Date    BREAST BIOPSY      BREAST BIOPSY Right 4/18/2019    RIGHT PARTIAL MASTECTOMY WITH NEEDLE LOCALIZATION AND SENTINEL NODE performed by Fabricio Drake MD at [facility] / REMOVAL / REPLACEMENT VENOUS ACCESS CATHETER N/A 5/17/2019    PORT INSERTION performed by Georges Liriano MD at [address] [place] [address] N/A 6/26/2020    PORT REMOVAL performed by Georges Liriano MD at [facility]       Family History:  Family History   Problem Relation Age of Onset    Breast Cancer Mother     Cancer Father         esophageal, stomach    Cancer Paternal Grandfather         colon       Medications:  Reviewed and reconciled. Social History:  Social History     Socioeconomic History    Marital status:      Spouse name: aPt Easley Number of children: 4    Years of education: 18     Highest education level: Bachelor's degree (e.g., BA, AB, BS)   Occupational History    Occupation: lab technican     Employer: [employer] Financial resource strain: Not hard at all    Food insecurity     Worry: Never true     Inability: Never true   [employer] needs     Medical: No     Non-medical: No   Tobacco Use    Smoking status: Never Smoker    Smokeless tobacco: Never Used   Substance and Sexual Activity    Alcohol use: Not Currently    Drug use: Never    Sexual activity: Yes     Partners: Male   Lifestyle    Physical activity     Days per week: 4 days     Minutes per session: 40 min    Stress:  To some extent   Relationships    Social connections     Talks on phone: More than three times a week     Gets together: More than three times a week     Attends Congregational service: 1 to 4 times per year     Active member of club or organization: No     Attends meetings of clubs or organizations: Never     Relationship status:     Intimate partner violence     Fear of current or ex partner: No     Emotionally abused: No     Physically abused: No     Forced sexual activity: No   Other Topics Concern    Not on file   Social History Narrative    Not on file       Allergies:  No Known Allergies     OB/GYN:  The patient is , she had her menarche at the age of 9-12, she had a hysterectomy, no oophorectomies, 1st live birth was at 20, she used OCPs for about 10 years. Physical Exam:  Blood pressure (!) 123/56, pulse 74, temperature 98.8 °F (37.1 °C), height 5' 3\" (1.6 m), weight 132 lb 3.2 oz (60 kg), SpO2 98 %. GENERAL: Alert, oriented x 3, not in acute distress. HEENT: PERRLA; EOMI. Oropharynx clear. NECK: Supple. No palpable cervical or supraclavicular lymphadenopathy. LUNGS: Good air entry bilaterally. No wheezing, crackles or rhonchi. CHEST: right sided port. CARDIOVASCULAR: Regular rate. No murmurs, rubs or gallops. ABDOMEN: Soft. Non-tender, non-distended. Positive bowel sounds. EXTREMITIES: Without clubbing, cyanosis, or edema. NEUROLOGIC: No focal deficits. ECOG PS 0    Impression/Plan:      The patient is a 63 y.o. postmenopausal lady with a PMH significant for hyperlipidemia, who had presented with an abnormal screening Mammogram, she was diagnosed with a right breast invasive ductal carcinoma, grade 2, measuring 9 mm, ER positive, 80% MS +5% HER-2/dougie positive, 3+ by IHC. Patient with clinical stage [id number] disease, she underwent on 2019 a right breast needle localized lumpectomy, with SLN excisional biopsy, tumor size is 2mm, grade 2, with associated DCIS, measuring 7mm, high grade, comedo type with central necrosis, margins are negative, 4 SN were removed, they were all neg for metastatic disease, final pathologic stage pT1a(sn)pN0M0, prognostic stage IA disease. I discussed with the patient and her spouse pathology results, stage and prognosis, adjuvant RT and adjuvant ET with an AI are recommended.  The patient has a small Her2 pos disease (<5mm), I reviewed with her the nccn guidelines and the risks of the  Chemotherapy, she is young and healthy, she is very nervous about the diagnosis of breast cancer, she is very well informed about her disease, she prefers to be treated, JAGDISH SHAFER was originally planned, but given the size of the tumor (<5mm), decided to treat with Taxol (weekly for 12 weeks) with herceptin, the side effects of the treatment including were discussed with the patient, 2D echo has been done LVEF is adequate for treatment with Herceptin. Patient with a personal history of breast cancer, family history is positive for breast and ovarian cancer, testing for HBOC is recommended, MyRisk testing was done, neg for clinically significant mutations. The patient was started on adjuvant chemotherapy with Taxol and Herceptin on 5/28/2019, she completed Taxol on 8/20/2019, completed Herceptin on 6/2/2020. Mild macrocytosis, ordered by B-12 and folate, B12 is borderline, ordered MMA, it is not elevated, antiparietal cells and intrinsic factor antibodies done to evaluate for pernicious anemia and are negative, she was started on parenteral vitamin B12 by her PCP. She was started on adjuvant endocrine therapy with Arimidex on 10/22/2019,she is tolerating it well overall. Continue Arimidex. I discussed with the patient the importance of monthly BSE, and routine screening mammograms. Bilateral screening mammogram on 05/04/2020, was negative. She will be due for bilateral mammogram again on 5/5/2021. It was also done with her next visit. Continue breast massage. DEXA scan was done at [facility] on 11/16/2020, revealing osteopenia. She will restart taking calcium and vitamin D, but only once a day and will drink lots of fluids. The surveillance guidelines were reviewed with the patient, continue with surveillance. RTC in 4 months, with mammogram same day.     Thank you for allowing us to participate in the care of Mrs. Quintanilla.     Flash León MD   HEMATOLOGY/MEDICAL ONCOLOGY  [facility] [address] ONCOLOGY  [facility] [address] [facility] [address]  Dept: [phone]

## 2021-04-21 DIAGNOSIS — Z12.31 SCREENING MAMMOGRAM FOR HIGH-RISK PATIENT: Primary | ICD-10-CM

## 2021-05-04 DIAGNOSIS — C50.911 INFILTRATING DUCTAL CARCINOMA OF RIGHT BREAST, STAGE 1 (HCC): ICD-10-CM

## 2021-05-04 DIAGNOSIS — Z12.31 SCREENING MAMMOGRAM FOR HIGH-RISK PATIENT: Primary | ICD-10-CM

## 2021-05-07 ENCOUNTER — OFFICE VISIT (OUTPATIENT)
Dept: ONCOLOGY | Age: 56
End: 2021-05-07
Payer: COMMERCIAL

## 2021-05-07 ENCOUNTER — HOSPITAL ENCOUNTER (OUTPATIENT)
Dept: INFUSION THERAPY | Age: 56
Discharge: HOME | End: 2021-05-07
Payer: COMMERCIAL

## 2021-05-07 ENCOUNTER — HOSPITAL ENCOUNTER (OUTPATIENT)
Dept: GENERAL RADIOLOGY | Age: 56
Discharge: HOME | End: 2021-05-09
Payer: COMMERCIAL

## 2021-05-07 VITALS
SYSTOLIC BLOOD PRESSURE: 110 MMHG | HEIGHT: 63 IN | OXYGEN SATURATION: 97 % | BODY MASS INDEX: 23.42 KG/M2 | RESPIRATION RATE: 16 BRPM | TEMPERATURE: 97.4 F | WEIGHT: 132.2 LBS | HEART RATE: 69 BPM | DIASTOLIC BLOOD PRESSURE: 69 MMHG

## 2021-05-07 DIAGNOSIS — C50.911 INFILTRATING DUCTAL CARCINOMA OF RIGHT BREAST, STAGE 1 (HCC): Primary | ICD-10-CM

## 2021-05-07 DIAGNOSIS — C50.911 INFILTRATING DUCTAL CARCINOMA OF RIGHT BREAST, STAGE 1 (HCC): ICD-10-CM

## 2021-05-07 DIAGNOSIS — Z12.31 SCREENING MAMMOGRAM FOR HIGH-RISK PATIENT: ICD-10-CM

## 2021-05-07 PROCEDURE — 99214 OFFICE O/P EST MOD 30 MIN: CPT | Performed by: INTERNAL MEDICINE

## 2021-05-07 PROCEDURE — 99212 OFFICE O/P EST SF 10 MIN: CPT

## 2021-05-07 PROCEDURE — 77063 BREAST TOMOSYNTHESIS BI: CPT

## 2021-05-07 NOTE — PROGRESS NOTES
[address] MED ONCOLOGY  [facility] [facility] [address]  Dept: [phone]  Attending Progress Note      Reason for Visit:   Right Breast Cancer. Referring Physician:  Annemarie Moseley MD    PCP:  Thad Adams MD    History of Present Illness: The patient is a 63 y.o. lady with a PMH significant for hyperlipidemia, who had presented with an abnormal screening Mammogram, was done on 3/12/2019, revealing calcifications in the right UOQ, she had a right breast US done on 3/13/85741, no masses were seen, she underwent on 3/29/2019 a stereotactic biopsy of the right breast calcifications. Due to biopsy needle malfunction and lack of tissue obtained,  a clip was not placed at the time of biopsy, path:    Breast, right, site not further specified, core needle biopsy:  Infiltrating ductal carcinoma, moderately differentiated. Ductal carcinoma in situ, comedo type, high nuclear grade. Comment:     Intradepartmental consultation is obtained.  Infiltrating  ductal carcinoma corresponds to architectural grade 3, nuclear grade 2,  mitotic grade 1; overall grade 6/9-moderately differentiated; grade 2. Breast Cancer Marker Studies:  Estrogen Receptors (ER): Positive (80% nuclei staining). Staining intensity = strong. Progesterone Receptors (AK): Positive (5% nuclei staining). Staining intensity = weak. Her-2/dougie (c-erb B-2) protein expression:  Positive (3+). She had bilateral Breast MRI done on 4/4/2019 revealing: There is scattered fibroglandular tissue with minimal background  parenchymal enhancement. An irregular, enhancing mass is noted in the  right breast 10:00 which measures 9 x 6 x 7 mm (image 43 of the axial  T1 postcontrast series). No suspicious mass or non-mass enhancement  seen on the left. There is no lymphadenopathy. Bilateral skin and  nipple areolar complexes are normal. Visualized portions of the chest  and abdomen are unremarkable. Impression  1. Irregular, enhancing mass in the right breast 10:00 is consistent  with biopsy-proven neoplasm. No additional foci of disease identified  in the right breast.  2. There is no lymphadenopathy. 3. No evidence of neoplasm on the left. RECOMMENDATION:  Continued surgical and oncologic consultation is advised. BIRADS 6: Known neoplasm    The patient underwent on 4/18/2019 a right breast needle localized lumpectomy, with SLN excisional biopsy, path:  CANCER CASE SUMMARY  Procedure: Excision (less than total mastectomy; needle localization  partial mastectomy [lumpectomy]). Specimen laterality: Right. Tumor site: Not specified. Tumor size: 0.2 cm (invasive component). Histologic type: Invasive ductal carcinoma. Histologic grade: Glandular/tubular differentiation-score = 3; nuclear  pleomorphism-score = 2; mitotic rate-score = 1.  Overall grade 2;  6/9-moderately differentiated. Tumor focality: Single focus of invasive carcinoma. Ductal carcinoma in situ: Present. Size/extent of DCIS: 0.7 cm.  DCIS completely surrounds a small, residual  focus of infiltrating ductal carcinoma in the current specimen. Architectural pattern: Comedo type. Nuclear grade: Grade III (high nuclear grade). Necrosis: Present, Central (expansive \"comedo\" necrosis). Microcalcifications: Present. Margins: Invasive carcinoma margins-uninvolved by invasive carcinoma. Distance from closest margin-0.9 cm (inferior inked margin). DCIS margins-uninvolved by DCIS.  Distance from closest margin-0.3 cm  (anterior inked margin of resection-specimen A). Note: Specimen C  represents additional anterior/superior breast tissue [3.0 x 2.5 x 1.0  cm] which represents larger final margin of resection for DCIS with  regard to anterior margin of resection). Regional lymph nodes: Uninvolved by tumor cells.  Number of lymph nodes  examined-4 (designated sentinel lymph nodes).  Number of lymph nodes  involved-0.   Treatment effect: No known presurgical therapy. Pathologic stage: pT1a-0.2 cm maximum dimension. Regional lymph nodes: (sn) pN0-no regional lymph node metastasis. Distant metastasis: pMX-cannot be assessed. Ancillary studies: Performed on the patient's previous biopsy specimen  (NNS-; 3/29/2019). Microcalcifications: Present in DCIS. Additional pathologic findings: Fibrocystic change.  Previous  biopsy/excision site. The patient has a small tumor, HER-2/dougie and hormone receptor positive, given her young age and excellent health, she was not comfortable with adjuvant treatment with only hormonal therapy. The patient was started on adjuvant chemotherapy with Taxol and Herceptin on 5/28/2019, she completed Taxol on 8/20/2019, completed Herceptin on 6/2/2020, she completed adjuvant RT on 10/10/2019. The patient was started on adjuvant endocrine therapy with Arimidex on 10/22/2019. The patient is tolerating the Arimidex well overall, sometimes she has discomfort in the legs. Review of Systems;  CONSTITUTIONAL: No fever, chills. Good appetite and energy level. ENMT: Eyes: No diplopia; Nose: No epistaxis. Mouth: No sore throat. RESPIRATORY: No hemoptysis, shortness of breath, cough. CARDIOVASCULAR: No chest pain, palpitations. GASTROINTESTINAL: No nausea/vomiting, abdominal pain, diarrhea/constipation. GENITOURINARY: No dysuria, urinary frequency, hematuria. NEURO: No syncope, presyncope, headache. MSK: she has chronic pain in the back and right hip.   Remainder:  ROS NEGATIVE    Past Medical History:      Diagnosis Date    Breast cancer ([address]     Cancer ([address]     malignant lesion in Right breast - for OR 4-18-19     History of therapeutic radiation     Hx antineoplastic chemo     Hyperlipidemia     no medications     Post-operative state 5/30/2019     Patient Active Problem List   Diagnosis    Infiltrating ductal carcinoma of right breast, stage 1 ([address]        Past Surgical History:      Procedure Laterality Date    No     Emotionally abused: No     Physically abused: No     Forced sexual activity: No   Other Topics Concern    Not on file   Social History Narrative    Not on file       Allergies:  No Known Allergies     OB/GYN:  The patient is , she had her menarche at the age of 9-12, she had a hysterectomy, no oophorectomies, 1st live birth was at 20, she used OCPs for about 10 years. Physical Exam:  Blood pressure 110/69, pulse 69, temperature 97.4 °F (36.3 °C), temperature source Temporal, resp. rate 16, height 5' 3\" (1.6 m), weight 132 lb 3.2 oz (60 kg), SpO2 97 %. GENERAL: Alert, oriented x 3, not in acute distress. HEENT: PERRLA; EOMI. Oropharynx clear. NECK: Supple. No palpable cervical or supraclavicular lymphadenopathy. LUNGS: Good air entry bilaterally. No wheezing, crackles or rhonchi. CHEST: right sided port. CARDIOVASCULAR: Regular rate. No murmurs, rubs or gallops. ABDOMEN: Soft. Non-tender, non-distended. Positive bowel sounds. EXTREMITIES: Without clubbing, cyanosis, or edema. NEUROLOGIC: No focal deficits. ECOG PS 0    Impression/Plan:      The patient is a 63 y.o. postmenopausal lady with a PMH significant for hyperlipidemia, who had presented with an abnormal screening Mammogram, she was diagnosed with a right breast invasive ductal carcinoma, grade 2, measuring 9 mm, ER positive, 80% OH +5% HER-2/dougie positive, 3+ by IHC. Patient with clinical stage [id number] disease, she underwent on 2019 a right breast needle localized lumpectomy, with SLN excisional biopsy, tumor size is 2mm, grade 2, with associated DCIS, measuring 7mm, high grade, comedo type with central necrosis, margins are negative, 4 SN were removed, they were all neg for metastatic disease, final pathologic stage pT1a(sn)pN0M0, prognostic stage IA disease. I discussed with the patient and her spouse pathology results, stage and prognosis, adjuvant RT and adjuvant ET with an AI are recommended.  The patient has a small Her2 pos disease (<5mm), I reviewed with her the nccn guidelines and the risks of the  Chemotherapy, she is young and healthy, she is very nervous about the diagnosis of breast cancer, she is very well informed about her disease, she prefers to be treated, JAGDISH SHAFER was originally planned, but given the size of the tumor (<5mm), decided to treat with Taxol (weekly for 12 weeks) with herceptin, the side effects of the treatment including were discussed with the patient, 2D echo has been done LVEF is adequate for treatment with Herceptin. Patient with a personal history of breast cancer, family history is positive for breast and ovarian cancer, testing for HBOC is recommended, MyRisk testing was done, neg for clinically significant mutations. The patient was started on adjuvant chemotherapy with Taxol and Herceptin on 5/28/2019, she completed Taxol on 8/20/2019, completed Herceptin on 6/2/2020. Mild macrocytosis, ordered by B-12 and folate, B12 is borderline, ordered MMA, it is not elevated, antiparietal cells and intrinsic factor antibodies done to evaluate for pernicious anemia and are negative, she was started on parenteral vitamin B12 by her PCP. She was started on adjuvant endocrine therapy with Arimidex on 10/22/2019,she is tolerating it well overall. The patient is doing well without any clinical evidence of disease recurrence, continue Arimidex. I discussed with the patient the importance of monthly BSE, and routine screening mammograms. Bilateral screening mammogram was done today 5/7/2021, it is negative for malignancy, results were reviewed, she will be due again for repeat screening mammogram on 5/8/2022. DEXA scan was done at [facility] on 11/16/2020, revealing osteopenia. Continue calcium and vitamin D. She will have a repeat DEXA scan done in November. The surveillance guidelines were reviewed with the patient, continue with surveillance.     RTC in 4 months. Thank you for allowing us to participate in the care of Mrs. Quintanilla.     Kai Rivera MD   HEMATOLOGY/MEDICAL ONCOLOGY  [address] [place] [address] ONCOLOGY  [facility] [address] [facility] [address]  Dept: [phone]

## 2021-09-06 DIAGNOSIS — Z12.31 SCREENING MAMMOGRAM FOR HIGH-RISK PATIENT: Primary | ICD-10-CM

## 2021-09-07 ENCOUNTER — OFFICE VISIT (OUTPATIENT)
Dept: ONCOLOGY | Age: 56
End: 2021-09-07
Payer: COMMERCIAL

## 2021-09-07 ENCOUNTER — HOSPITAL ENCOUNTER (OUTPATIENT)
Dept: INFUSION THERAPY | Age: 56
Discharge: HOME | End: 2021-09-07
Payer: COMMERCIAL

## 2021-09-07 VITALS
BODY MASS INDEX: 23.02 KG/M2 | HEART RATE: 66 BPM | WEIGHT: 129.9 LBS | SYSTOLIC BLOOD PRESSURE: 120 MMHG | HEIGHT: 63 IN | TEMPERATURE: 98 F | DIASTOLIC BLOOD PRESSURE: 58 MMHG | OXYGEN SATURATION: 98 %

## 2021-09-07 DIAGNOSIS — Z79.811 USE OF AROMATASE INHIBITORS: Primary | ICD-10-CM

## 2021-09-07 DIAGNOSIS — C50.911 INFILTRATING DUCTAL CARCINOMA OF RIGHT BREAST, STAGE 1 (HCC): ICD-10-CM

## 2021-09-07 DIAGNOSIS — C50.911 INFILTRATING DUCTAL CARCINOMA OF RIGHT BREAST, STAGE 1 (HCC): Primary | ICD-10-CM

## 2021-09-07 PROCEDURE — 99213 OFFICE O/P EST LOW 20 MIN: CPT

## 2021-09-07 PROCEDURE — 99214 OFFICE O/P EST MOD 30 MIN: CPT | Performed by: INTERNAL MEDICINE

## 2021-09-07 RX ORDER — ANASTROZOLE 1 MG/1
1 TABLET ORAL DAILY
Qty: 90 TABLET | Refills: 3 | Status: SHIPPED
Start: 2021-09-07 | End: 2022-01-12 | Stop reason: SDUPTHER

## 2021-09-07 NOTE — PROGRESS NOTES
[address] [facility]  [address] [facility] [address]  Dept: [phone]  Attending Progress Note      Reason for Visit:   Right Breast Cancer. Referring Physician:  Patricia Haas MD    PCP:  Mario Blair MD    History of Present Illness: The patient is a 63 y.o. lady with a PMH significant for hyperlipidemia, who had presented with an abnormal screening Mammogram, was done on 3/12/2019, revealing calcifications in the right UOQ, she had a right breast US done on 3/13/[address]84, no masses were seen, she underwent on 3/29/2019 a stereotactic biopsy of the right breast calcifications. Due to biopsy needle malfunction and lack of tissue obtained,  a clip was not placed at the time of biopsy, path:    Breast, right, site not further specified, core needle biopsy:  Infiltrating ductal carcinoma, moderately differentiated. Ductal carcinoma in situ, comedo type, high nuclear grade. Comment:     Intradepartmental consultation is obtained.  Infiltrating  ductal carcinoma corresponds to architectural grade 3, nuclear grade 2,  mitotic grade 1; overall grade 6/9-moderately differentiated; grade 2. Breast Cancer Marker Studies:  Estrogen Receptors (ER): Positive (80% nuclei staining). Staining intensity = strong. Progesterone Receptors (NC): Positive (5% nuclei staining). Staining intensity = weak. Her-2/dougie (c-erb B-2) protein expression:  Positive (3+). She had bilateral Breast MRI done on 4/4/2019 revealing: There is scattered fibroglandular tissue with minimal background  parenchymal enhancement. An irregular, enhancing mass is noted in the  right breast 10:00 which measures 9 x 6 x 7 mm (image 43 of the axial  T1 postcontrast series). No suspicious mass or non-mass enhancement  seen on the left. There is no lymphadenopathy. Bilateral skin and  nipple areolar complexes are normal. Visualized portions of the chest  and abdomen are unremarkable. Impression  1. Irregular, enhancing mass in the right breast 10:00 is consistent  with biopsy-proven neoplasm. No additional foci of disease identified  in the right breast.  2. There is no lymphadenopathy. 3. No evidence of neoplasm on the left. RECOMMENDATION:  Continued surgical and oncologic consultation is advised. BIRADS 6: Known neoplasm    The patient underwent on 4/18/2019 a right breast needle localized lumpectomy, with SLN excisional biopsy, path:  CANCER CASE SUMMARY  Procedure: Excision (less than total mastectomy; needle localization  partial mastectomy [lumpectomy]). Specimen laterality: Right. Tumor site: Not specified. Tumor size: 0.2 cm (invasive component). Histologic type: Invasive ductal carcinoma. Histologic grade: Glandular/tubular differentiation-score = 3; nuclear  pleomorphism-score = 2; mitotic rate-score = 1.  Overall grade 2;  6/9-moderately differentiated. Tumor focality: Single focus of invasive carcinoma. Ductal carcinoma in situ: Present. Size/extent of DCIS: 0.7 cm.  DCIS completely surrounds a small, residual  focus of infiltrating ductal carcinoma in the current specimen. Architectural pattern: Comedo type. Nuclear grade: Grade III (high nuclear grade). Necrosis: Present, Central (expansive \"comedo\" necrosis). Microcalcifications: Present. Margins: Invasive carcinoma margins-uninvolved by invasive carcinoma. Distance from closest margin-0.9 cm (inferior inked margin). DCIS margins-uninvolved by DCIS.  Distance from closest margin-0.3 cm  (anterior inked margin of resection-specimen A). Note: Specimen C  represents additional anterior/superior breast tissue [3.0 x 2.5 x 1.0  cm] which represents larger final margin of resection for DCIS with  regard to anterior margin of resection). Regional lymph nodes: Uninvolved by tumor cells.  Number of lymph nodes  examined-4 (designated sentinel lymph nodes).  Number of lymph nodes  involved-0.   Treatment effect: No known presurgical therapy. Pathologic stage: pT1a-0.2 cm maximum dimension. Regional lymph nodes: (sn) pN0-no regional lymph node metastasis. Distant metastasis: pMX-cannot be assessed. Ancillary studies: Performed on the patient's previous biopsy specimen  (ILP-; 3/29/2019). Microcalcifications: Present in DCIS. Additional pathologic findings: Fibrocystic change.  Previous  biopsy/excision site. The patient has a small tumor, HER-2/dougie and hormone receptor positive, given her young age and excellent health, she was not comfortable with adjuvant treatment with only hormonal therapy. The patient was started on adjuvant chemotherapy with Taxol and Herceptin on 5/28/2019, she completed Taxol on 8/20/2019, completed Herceptin on 6/2/2020, she completed adjuvant RT on 10/10/2019. The patient was started on adjuvant endocrine therapy with Arimidex on 10/22/2019. The patient is tolerating the Arimidex well overall, sometimes her legs feel achy, she pulled a muscle in the right chest, the pain is getting better. Review of Systems;  CONSTITUTIONAL: No fever, chills. Good appetite and energy level. ENMT: Eyes: No diplopia; Nose: No epistaxis. Mouth: No sore throat. RESPIRATORY: No hemoptysis, shortness of breath, cough. CARDIOVASCULAR: No chest pain, palpitations. GASTROINTESTINAL: No nausea/vomiting, abdominal pain, diarrhea/constipation. GENITOURINARY: No dysuria, urinary frequency, hematuria. NEURO: No syncope, presyncope, headache. MSK: she has chronic pain in the back and right hip.   Remainder:  ROS NEGATIVE    Past Medical History:      Diagnosis Date    [facility]     Cancer ([facility]     malignant lesion in Right breast - for OR 4-18-19     History of therapeutic radiation     Hx antineoplastic chemo     Hyperlipidemia     no medications     Post-operative state 5/30/2019     Patient Active Problem List   Diagnosis    Infiltrating ductal carcinoma of right breast, stage 1 (HCC)        Past Surgical History:      Procedure Laterality Date    BREAST BIOPSY      BREAST BIOPSY Right 4/18/2019    RIGHT PARTIAL MASTECTOMY WITH NEEDLE LOCALIZATION AND SENTINEL NODE performed by Suraj Busch MD at [address] [place] [address] OF UTERUS      HYSTERECTOMY      INSERTION / REMOVAL / REPLACEMENT VENOUS ACCESS CATHETER N/A 5/17/2019    PORT INSERTION performed by Chris Evans MD at [facility] N/A 6/26/2020    PORT REMOVAL performed by Chris [facility] at [facility]       Family History:  Family History   Problem Relation Age of Onset    Breast Cancer Mother     Cancer Father         esophageal, stomach    Cancer Paternal Grandfather         colon       Medications:  Reviewed and reconciled. Social History:  Social History     Socioeconomic History    Marital status:      Spouse name: Larissa Bay Number of children: 4    Years of education: 18     Highest education level: Bachelor's degree (e.g., BA, AB, BS)   Occupational History    Occupation: lab technican     Employer: [address]   Tobacco Use    Smoking status: Never Smoker    Smokeless tobacco: Never Used   Vaping Use    Vaping Use: Never used   Substance and Sexual Activity    Alcohol use: Not Currently    Drug use: Never    Sexual activity: Yes     Partners: Male   Other Topics Concern    Not on file   Social History Narrative    Not on file     Social Determinants of Health     Financial Resource Strain:     Difficulty of Paying Living Expenses:    Food Insecurity:     Worried About Running Out of Food in the Last Year:     [address] in the Last Year:    Transportation Needs:     Lack of Transportation (Medical):      Lack of Transportation (Non-Medical):    Physical Activity:     Days of Exercise per Week:     Minutes of Exercise per Session:    Stress:     Feeling of Stress :    Social Connections:     Frequency of Communication with discussed with the patient and her spouse pathology results, stage and prognosis, adjuvant RT and adjuvant ET with an AI are recommended. The patient has a small Her2 pos disease (<5mm), I reviewed with her the nccn guidelines and the risks of the  Chemotherapy, she is young and healthy, she is very nervous about the diagnosis of breast cancer, she is very well informed about her disease, she prefers to be treated, JAGDISH SHAFER was originally planned, but given the size of the tumor (<5mm), decided to treat with Taxol (weekly for 12 weeks) with herceptin, the side effects of the treatment including were discussed with the patient, 2D echo has been done LVEF is adequate for treatment with Herceptin. Patient with a personal history of breast cancer, family history is positive for breast and ovarian cancer, testing for HBOC is recommended, MyRisk testing was done, neg for clinically significant mutations. The patient was started on adjuvant chemotherapy with Taxol and Herceptin on 5/28/2019, she completed Taxol on 8/20/2019, completed Herceptin on 6/2/2020. Mild macrocytosis, ordered by B-12 and folate, B12 is borderline, ordered MMA, it is not elevated, antiparietal cells and intrinsic factor antibodies done to evaluate for pernicious anemia and are negative, she was started on parenteral vitamin B12 by her PCP. She was started on adjuvant endocrine therapy with Arimidex on 10/22/2019,she is tolerating it well overall. The patient is doing well without any clinical evidence of disease recurrence, continue Arimidex. If the pain in the right chest wall does not completely resolve she will let me know. I discussed with the patient the importance of monthly BSE, and routine screening mammograms. Bilateral screening mammogram was done today 5/7/2021, it is negative for malignancy, results were reviewed, she will be due again for repeat screening mammogram on 5/8/2022.     DEXA scan was done at [facility]

## 2021-11-16 ENCOUNTER — TELEPHONE (OUTPATIENT)
Dept: CASE MANAGEMENT | Age: 56
End: 2021-11-16

## 2021-11-16 NOTE — TELEPHONE ENCOUNTER
Called patient re: Dexa Scan ordered. Patient has testing done at [facility]. Appointment is scheduled for this Friday. Will request results and scan into patient EMR.

## 2022-01-12 ENCOUNTER — OFFICE VISIT (OUTPATIENT)
Dept: ONCOLOGY | Age: 57
End: 2022-01-12
Payer: COMMERCIAL

## 2022-01-12 ENCOUNTER — HOSPITAL ENCOUNTER (OUTPATIENT)
Dept: INFUSION THERAPY | Age: 57
Discharge: HOME | End: 2022-01-12

## 2022-01-12 VITALS
TEMPERATURE: 97.8 F | HEIGHT: 63 IN | DIASTOLIC BLOOD PRESSURE: 51 MMHG | SYSTOLIC BLOOD PRESSURE: 115 MMHG | WEIGHT: 135 LBS | HEART RATE: 66 BPM | RESPIRATION RATE: 20 BRPM | BODY MASS INDEX: 23.92 KG/M2

## 2022-01-12 DIAGNOSIS — C50.911 INFILTRATING DUCTAL CARCINOMA OF RIGHT BREAST, STAGE 1 (HCC): ICD-10-CM

## 2022-01-12 PROCEDURE — 99214 OFFICE O/P EST MOD 30 MIN: CPT | Performed by: INTERNAL MEDICINE

## 2022-01-12 PROCEDURE — 99212 OFFICE O/P EST SF 10 MIN: CPT

## 2022-01-12 RX ORDER — ANASTROZOLE 1 MG/1
1 TABLET ORAL DAILY
Qty: 90 TABLET | Refills: 3 | Status: SHIPPED
Start: 2022-01-12 | End: 2022-05-18 | Stop reason: SDUPTHER

## 2022-01-12 NOTE — PROGRESS NOTES
[address] [facility]  [address] [place] [place] [address]  Dept: [phone]  Loc: [phone]  Attending Progress Note      Reason for Visit:   Right Breast Cancer. Referring Physician:  Chris Nichols MD    PCP:  Bridget Newton MD    History of Present Illness: The patient is a 61 y.o. lady with a PMH significant for hyperlipidemia, who had presented with an abnormal screening Mammogram, was done on 3/12/2019, revealing calcifications in the right UOQ, she had a right breast US done on 3/13/22976, no masses were seen, she underwent on 3/29/2019 a stereotactic biopsy of the right breast calcifications. Due to biopsy needle malfunction and lack of tissue obtained,  a clip was not placed at the time of biopsy, path:    Breast, right, site not further specified, core needle biopsy:  Infiltrating ductal carcinoma, moderately differentiated. Ductal carcinoma in situ, comedo type, high nuclear grade. Comment:     Intradepartmental consultation is obtained.  Infiltrating  ductal carcinoma corresponds to architectural grade 3, nuclear grade 2,  mitotic grade 1; overall grade 6/9-moderately differentiated; grade 2. Breast Cancer Marker Studies:  Estrogen Receptors (ER): Positive (80% nuclei staining). Staining intensity = strong. Progesterone Receptors (MA): Positive (5% nuclei staining). Staining intensity = weak. Her-2/dougie (c-erb B-2) protein expression:  Positive (3+). She had bilateral Breast MRI done on 4/4/2019 revealing: There is scattered fibroglandular tissue with minimal background  parenchymal enhancement. An irregular, enhancing mass is noted in the  right breast 10:00 which measures 9 x 6 x 7 mm (image 43 of the axial  T1 postcontrast series). No suspicious mass or non-mass enhancement  seen on the left. There is no lymphadenopathy.  Bilateral skin and  nipple areolar complexes are normal. Visualized portions of the chest  and abdomen are unremarkable. Impression  1. Irregular, enhancing mass in the right breast 10:00 is consistent  with biopsy-proven neoplasm. No additional foci of disease identified  in the right breast.  2. There is no lymphadenopathy. 3. No evidence of neoplasm on the left. RECOMMENDATION:  Continued surgical and oncologic consultation is advised. BIRADS 6: Known neoplasm    The patient underwent on 4/18/2019 a right breast needle localized lumpectomy, with SLN excisional biopsy, path:  CANCER CASE SUMMARY  Procedure: Excision (less than total mastectomy; needle localization  partial mastectomy [lumpectomy]). Specimen laterality: Right. Tumor site: Not specified. Tumor size: 0.2 cm (invasive component). Histologic type: Invasive ductal carcinoma. Histologic grade: Glandular/tubular differentiation-score = 3; nuclear  pleomorphism-score = 2; mitotic rate-score = 1.  Overall grade 2;  6/9-moderately differentiated. Tumor focality: Single focus of invasive carcinoma. Ductal carcinoma in situ: Present. Size/extent of DCIS: 0.7 cm.  DCIS completely surrounds a small, residual  focus of infiltrating ductal carcinoma in the current specimen. Architectural pattern: Comedo type. Nuclear grade: Grade III (high nuclear grade). Necrosis: Present, Central (expansive \"comedo\" necrosis). Microcalcifications: Present. Margins: Invasive carcinoma margins-uninvolved by invasive carcinoma. Distance from closest margin-0.9 cm (inferior inked margin). DCIS margins-uninvolved by DCIS.  Distance from closest margin-0.3 cm  (anterior inked margin of resection-specimen A). Note: Specimen C  represents additional anterior/superior breast tissue [3.0 x 2.5 x 1.0  cm] which represents larger final margin of resection for DCIS with  regard to anterior margin of resection).   Regional lymph nodes: Uninvolved by tumor cells.  Number of lymph nodes  examined-4 (designated sentinel lymph nodes).  Number of lymph nodes  involved-0. Treatment effect: No known presurgical therapy. Pathologic stage: pT1a-0.2 cm maximum dimension. Regional lymph nodes: (sn) pN0-no regional lymph node metastasis. Distant metastasis: pMX-cannot be assessed. Ancillary studies: Performed on the patient's previous biopsy specimen  (R-; 3/29/2019). Microcalcifications: Present in DCIS. Additional pathologic findings: Fibrocystic change.  Previous  biopsy/excision site. The patient has a small tumor, HER-2/dougie and hormone receptor positive, given her young age and excellent health, she was not comfortable with adjuvant treatment with only hormonal therapy. The patient was started on adjuvant chemotherapy with Taxol and Herceptin on 5/28/2019, she completed Taxol on 8/20/2019, completed Herceptin on 6/2/2020, she completed adjuvant RT on 10/10/2019. The patient was started on adjuvant endocrine therapy with Arimidex on 10/22/2019. The patient is tolerating the Arimidex well overall, sometimes her legs feel achy, otherwise doing well, does not take the calcium and vitamin D consistently because they bother her stomach. Review of Systems;  CONSTITUTIONAL: No fever, chills. Good appetite and energy level. ENMT: Eyes: No diplopia; Nose: No epistaxis. Mouth: No sore throat. RESPIRATORY: No hemoptysis, shortness of breath, cough. CARDIOVASCULAR: No chest pain, palpitations. GASTROINTESTINAL: No nausea/vomiting, abdominal pain, diarrhea/constipation. GENITOURINARY: No dysuria, urinary frequency, hematuria. NEURO: No syncope, presyncope, headache. MSK: she has chronic pain in the back and right hip.   Remainder:  ROS NEGATIVE    Past Medical History:      Diagnosis Date    [facility]     Cancer ([facility]     malignant lesion in Right breast - for OR 4-18-19     History of therapeutic radiation     Hx antineoplastic chemo     Hyperlipidemia     no medications     Post-operative state 5/30/2019     Patient Active Problem List   Diagnosis    Infiltrating ductal carcinoma of right breast, stage 1 (HCC)        Past Surgical History:      Procedure Laterality Date    BREAST BIOPSY      BREAST BIOPSY Right 4/18/2019    RIGHT PARTIAL MASTECTOMY WITH NEEDLE LOCALIZATION AND SENTINEL NODE performed by Singh Mcgovern MD at [address] LUMPECTOMY      DILATION AND CURETTAGE OF UTERUS      HYSTERECTOMY      INSERTION / REMOVAL / REPLACEMENT VENOUS ACCESS CATHETER N/A 5/17/2019    PORT INSERTION performed [facility] Jeremy [facility]kins MD at [facility] N/A 6/26/2020    PORT REMOVAL performed by Jeremy Hoskins MD at [facility]       Family History:  Family History   Problem Relation Age of Onset    Breast Cancer Mother     Cancer Father         esophageal, stomach    Cancer Paternal Grandfather         colon       Medications:  Reviewed and reconciled. Social History:  Social History     Socioeconomic History    Marital status:      Spouse name: Shyann Liriano Number of children: 4    Years of education: 18     Highest education level: Bachelor's degree (e.g., BA, AB, BS)   Occupational History    Occupation: lab technican     Employer: [address]   Tobacco Use    Smoking status: Never Smoker    Smokeless tobacco: Never Used   Vaping Use    Vaping Use: Never used   Substance and Sexual Activity    Alcohol use: Not Currently    Drug use: Never    Sexual activity: Yes     Partners: Male   Other Topics Concern    Not on file   Social History Narrative    Not on file     Social Determinants of Health     Financial Resource Strain:     Difficulty of Paying Living Expenses: Not on file   Food Insecurity:     Worried About Running Out of Food in the Last Year: Not on file    Tariq of Food in the Last Year: Not on file   Transportation Needs:     Lack of Transportation (Medical): Not on file    Lack of Transportation (Non-Medical):  Not on file   Physical Activity:     Days of Exercise per Week: Not on file    Minutes of Exercise per Session: Not on file   Stress:     Feeling of Stress : Not on file   Social Connections:     Frequency of Communication with Friends and Family: Not on file    Frequency of Social Gatherings with Friends and Family: Not on file    Attends Sabianism Services: Not on file    Active Member of [address] [place] [address] [employer] or Organizations: Not on file    Attends Club or Organization Meetings: Not on file    Marital Status: Not on file   Intimate Partner Violence:     Fear of Current or Ex-Partner: Not on file    Emotionally Abused: Not on file    Physically Abused: Not on file    Sexually Abused: Not on file   Housing Stability:     Unable to Pay for Housing in the Last Year: Not on file    Number of [place] in the Last Year: Not on file    Unstable Housing in the Last Year: Not on file       Allergies:  No Known Allergies     OB/GYN:  The patient is , she had her menarche at the age of 11-12, she had a hysterectomy, no oophorectomies, 1st live birth was at 20, she used OCPs for about 10 years. Physical Exam:  Blood pressure (!) 115/51, pulse 66, temperature 97.8 °F (36.6 °C), temperature source Temporal, resp. rate 20, height 5' 3\" (1.6 m), weight 135 lb (61.2 kg). GENERAL: Alert, oriented x 3, not in acute distress. HEENT: PERRLA; EOMI. Oropharynx clear. NECK: Supple. No palpable cervical or supraclavicular lymphadenopathy. LUNGS: Good air entry bilaterally. No wheezing, crackles or rhonchi. CHEST: right sided port. CARDIOVASCULAR: Regular rate. No murmurs, rubs or gallops. ABDOMEN: Soft. Non-tender, non-distended. Positive bowel sounds. EXTREMITIES: Without clubbing, cyanosis, or edema. NEUROLOGIC: No focal deficits.    ECOG PS 0    Impression/Plan:      The patient is a 61 y.o. postmenopausal lady with a PMH significant for hyperlipidemia, who had presented with an abnormal screening Mammogram, she was diagnosed with a right breast invasive ductal carcinoma, grade 2, measuring 9 mm, ER positive, 80% [place] +5% HER-2/dougie positive, 3+ by IHC. Patient with clinical stage [id number] disease, she underwent on 4/18/2019 a right breast needle localized lumpectomy, with SLN excisional biopsy, tumor size is 2mm, grade 2, with associated DCIS, measuring 7mm, high grade, comedo type with central necrosis, margins are negative, 4 SN were removed, they were all neg for metastatic disease, final pathologic stage pT1a(sn)pN0M0, prognostic stage IA disease. I discussed with the patient and her spouse pathology results, stage and prognosis, adjuvant RT and adjuvant ET with an AI are recommended. The patient has a small Her2 pos disease (<5mm), I reviewed with her the nccn guidelines and the risks of the  Chemotherapy, she is young and healthy, she is very nervous about the diagnosis of breast cancer, she is very well informed about her disease, she prefers to be treated, JAGDISH SHAFER was originally planned, but given the size of the tumor (<5mm), decided to treat with Taxol (weekly for 12 weeks) with herceptin, the side effects of the treatment including were discussed with the patient, 2D echo has been done LVEF is adequate for treatment with Herceptin. Patient with a personal history of breast cancer, family history is positive for breast and ovarian cancer, testing for HBOC is recommended, MyRisk testing was done, neg for clinically significant mutations. The patient was started on adjuvant chemotherapy with Taxol and Herceptin on 5/28/2019, she completed Taxol on 8/20/2019, completed Herceptin on 6/2/2020. Mild macrocytosis, ordered by B-12 and folate, B12 is borderline, ordered MMA, it is not elevated, antiparietal cells and intrinsic factor antibodies done to evaluate for pernicious anemia and are negative, she was started on parenteral vitamin B12 by her PCP.     She was started on adjuvant endocrine therapy with Arimidex on 10/22/2019,she is tolerating it well overall. The patient is doing well without any clinical evidence of disease recurrence, continue Arimidex. If the pain in the right chest wall does not completely resolve she will let me know. I discussed with the patient the importance of monthly BSE, and routine screening mammograms. Bilateral screening mammogram was done today 5/7/2021, it is negative for malignancy, results were reviewed, she will be due again for repeat screening mammogram on 5/8/2022, will be done at [facility], ordered. DEXA scan was done at [facility] in November 2021, revealing osteopenia with a T score of -1.1. recommended calcium and vitamin D, can try the chewables. She will have a repeat DEXA scan done in November of 2022. The surveillance guidelines were reviewed with the patient, continue with surveillance. Labs reviewed. RTC in May with mammogram prior, will change the follow-up schedule to every 6 months after May. Thank you for allowing us to participate in the care of Mrs. Quintanilla.     Sarath Ferguson MD   HEMATOLOGY/MEDICAL [address] [place] [address] MEDICAL ONCOLOGY  [address] [place], [place] [address] [address]  Dept: [phone] [place]: [phone]

## 2022-05-18 ENCOUNTER — HOSPITAL ENCOUNTER (OUTPATIENT)
Dept: INFUSION THERAPY | Age: 57
Discharge: HOME | End: 2022-05-18

## 2022-05-18 ENCOUNTER — OFFICE VISIT (OUTPATIENT)
Dept: ONCOLOGY | Age: 57
End: 2022-05-18
Payer: COMMERCIAL

## 2022-05-18 VITALS
HEART RATE: 71 BPM | OXYGEN SATURATION: 100 % | WEIGHT: 131.6 LBS | TEMPERATURE: 97.2 F | HEIGHT: 63 IN | BODY MASS INDEX: 23.32 KG/M2

## 2022-05-18 DIAGNOSIS — Z78.0 POSTMENOPAUSAL: Primary | ICD-10-CM

## 2022-05-18 DIAGNOSIS — C50.911 INFILTRATING DUCTAL CARCINOMA OF RIGHT BREAST, STAGE 1 (HCC): ICD-10-CM

## 2022-05-18 PROCEDURE — 99214 OFFICE O/P EST MOD 30 MIN: CPT | Performed by: INTERNAL MEDICINE

## 2022-05-18 PROCEDURE — 99213 OFFICE O/P EST LOW 20 MIN: CPT

## 2022-05-18 RX ORDER — ANASTROZOLE 1 MG/1
1 TABLET ORAL DAILY
Qty: 90 TABLET | Refills: 3 | Status: SHIPPED | OUTPATIENT
Start: 2022-05-18

## 2022-05-18 NOTE — PROGRESS NOTES
[address] [facility] MEDICAL ONCOLOGY  [address] [place] [address]  [place] [address]  Dept: [phone]  Loc: [phone]  Attending Progress Note       Reason for Visit:   Right Breast Cancer. Referring Physician:  Kentrell Chapman MD    PCP:  Mercedes Canas MD    History of Present Illness: The patient is a 61 y.o. lady with a PMH significant for hyperlipidemia, who had presented with an abnormal screening Mammogram, was done on 3/12/2019, revealing calcifications in the right UOQ, she had a right breast US done on 3/13/90412, no masses were seen, she underwent on 3/29/2019 a stereotactic biopsy of the right breast calcifications. Due to biopsy needle malfunction and lack of tissue obtained,  a clip was not placed at the time of biopsy, path:    Breast, right, site not further specified, core needle biopsy:  Infiltrating ductal carcinoma, moderately differentiated. Ductal carcinoma in situ, comedo type, high nuclear grade. Comment:     Intradepartmental consultation is obtained.  Infiltrating  ductal carcinoma corresponds to architectural grade 3, nuclear grade 2,  mitotic grade 1; overall grade 6/9-moderately differentiated; grade 2. Breast Cancer Marker Studies:  Estrogen Receptors (ER): Positive (80% nuclei staining). Staining intensity = strong. Progesterone Receptors (CO): Positive (5% nuclei staining). Staining intensity = weak. Her-2/dougie (c-erb B-2) protein expression:  Positive (3+). She had bilateral Breast MRI done on 4/4/2019 revealing: There is scattered fibroglandular tissue with minimal background  parenchymal enhancement. An irregular, enhancing mass is noted in the  right breast 10:00 which measures 9 x 6 x 7 mm (image 43 of the axial  T1 postcontrast series). No suspicious mass or non-mass enhancement  seen on the left. There is no lymphadenopathy.  Bilateral skin and  nipple areolar complexes are normal. Visualized portions of the chest  and abdomen are unremarkable. Impression  1. Irregular, enhancing mass in the right breast 10:00 is consistent  with biopsy-proven neoplasm. No additional foci of disease identified  in the right breast.  2. There is no lymphadenopathy. 3. No evidence of neoplasm on the left. RECOMMENDATION:  Continued surgical and oncologic consultation is advised. BIRADS 6: Known neoplasm    The patient underwent on 4/18/2019 a right breast needle localized lumpectomy, with SLN excisional biopsy, path:  CANCER CASE SUMMARY  Procedure: Excision (less than total mastectomy; needle localization  partial mastectomy [lumpectomy]). Specimen laterality: Right. Tumor site: Not specified. Tumor size: 0.2 cm (invasive component). Histologic type: Invasive ductal carcinoma. Histologic grade: Glandular/tubular differentiation-score = 3; nuclear  pleomorphism-score = 2; mitotic rate-score = 1.  Overall grade 2;  6/9-moderately differentiated. Tumor focality: Single focus of invasive carcinoma. Ductal carcinoma in situ: Present. Size/extent of DCIS: 0.7 cm.  DCIS completely surrounds a small, residual  focus of infiltrating ductal carcinoma in the current specimen. Architectural pattern: Comedo type. Nuclear grade: Grade III (high nuclear grade). Necrosis: Present, Central (expansive \"comedo\" necrosis). Microcalcifications: Present. Margins: Invasive carcinoma margins-uninvolved by invasive carcinoma. Distance from closest margin-0.9 cm (inferior inked margin). DCIS margins-uninvolved by DCIS.  Distance from closest margin-0.3 cm  (anterior inked margin of resection-specimen A). Note: Specimen C  represents additional anterior/superior breast tissue [3.0 x 2.5 x 1.0  cm] which represents larger final margin of resection for DCIS with  regard to anterior margin of resection).   Regional lymph nodes: Uninvolved by tumor cells.  Number of lymph nodes  examined-4 (designated sentinel lymph nodes).  Number of lymph nodes  involved-0. Treatment effect: No known presurgical therapy. Pathologic stage: pT1a-0.2 cm maximum dimension. Regional lymph nodes: (sn) pN0-no regional lymph node metastasis. Distant metastasis: pMX-cannot be assessed. Ancillary studies: Performed on the patient's previous biopsy specimen  (SGQ-; 3/29/2019). Microcalcifications: Present in DCIS. Additional pathologic findings: Fibrocystic change.  Previous  biopsy/excision site. The patient has a small tumor, HER-2/dougie and hormone receptor positive, given her young age and excellent health, she was not comfortable with adjuvant treatment with only hormonal therapy. The patient was started on adjuvant chemotherapy with Taxol and Herceptin on 5/28/2019, she completed Taxol on 8/20/2019, completed Herceptin on 6/2/2020, she completed adjuvant RT on 10/10/2019. The patient was started on adjuvant endocrine therapy with Arimidex on 10/22/2019. The patient is tolerating the Arimidex well overall, her legs still ache sometimes, otherwise doing well. Review of Systems;  CONSTITUTIONAL: No fever, chills. Good appetite and energy level. ENMT: Eyes: No diplopia; Nose: No epistaxis. Mouth: No sore throat. RESPIRATORY: No hemoptysis, shortness of breath, cough. CARDIOVASCULAR: No chest pain, palpitations. GASTROINTESTINAL: No nausea/vomiting, abdominal pain, diarrhea/constipation. GENITOURINARY: No dysuria, urinary frequency, hematuria. NEURO: No syncope, presyncope, headache. MSK: she has chronic pain in the back and right hip.   Remainder:  ROS NEGATIVE    Past Medical History:      Diagnosis Date    Breast cancer ([address]     Cancer ([address]     malignant lesion in Right breast - for OR 4-18-19     History of therapeutic radiation     Hx antineoplastic chemo     Hyperlipidemia     no medications     Post-operative state 5/30/2019     Patient Active Problem List   Diagnosis    Infiltrating ductal carcinoma of right breast, stage 1 ([address] Past Surgical History:      Procedure Laterality Date    BREAST BIOPSY      BREAST BIOPSY Right 4/18/2019    RIGHT PARTIAL MASTECTOMY WITH NEEDLE LOCALIZATION AND SENTINEL NODE performed by Gloria Schofield MD at [address] [place] [address] OF UTERUS      HYSTERECTOMY      INSERTION / REMOVAL / REPLACEMENT VENOUS ACCESS CATHETER N/A 5/17/2019    PORT INSERTION performed by Missy Frazier MD at [facility] N/A 6/26/2020    PORT REMOVAL performed by [facility]y Frazier MD at [facility] OR       Family History:  Family History   Problem Relation Age of Onset    Breast Cancer Mother     Cancer Father         esophageal, stomach    Cancer Paternal Grandfather         colon       Medications:  Reviewed and reconciled. Social History:  Social History     Socioeconomic History    Marital status:      Spouse name: Justus Linares Number of children: 4    Years of education: 18     Highest education level: Bachelor's degree (e.g., BA, AB, BS)   Occupational History    Occupation: lab technican     Employer: [address]   Tobacco Use    Smoking status: Never Smoker    Smokeless tobacco: Never Used   Vaping Use    Vaping Use: Never used   Substance and Sexual Activity    Alcohol use: Not Currently    Drug use: Never    Sexual activity: Yes     Partners: Male   Other Topics Concern    Not on file   Social History Narrative    Not on file     Social Determinants of Health     Financial Resource Strain:     Difficulty of Paying Living Expenses: Not on file   Food Insecurity:     Worried About Running Out of Food in the Last Year: Not on file    Tariq of Food in the Last Year: Not on file   Transportation Needs:     Lack of Transportation (Medical): Not on file    Lack of Transportation (Non-Medical):  Not on file   Physical Activity:     Days of Exercise per Week: Not on file    Minutes of Exercise per Session: Not on file   Stress:  Feeling of Stress : Not on file   Social Connections:     Frequency of Communication with Friends and Family: Not on file    Frequency of Social Gatherings with Friends and Family: Not on file    Attends Buddhism Services: Not on file    Active Member of Clubs or Organizations: Not on file    Attends Club or Organization Meetings: Not on file    Marital Status: Not on file   Intimate Partner Violence:     Fear of Current or Ex-Partner: Not on file    Emotionally Abused: Not on file    Physically Abused: Not on file    Sexually Abused: Not on file   Housing Stability:     Unable to Pay for Housing in the Last Year: Not on file    Number of [place] in the Last Year: Not on file    Unstable Housing in the Last Year: Not on file       Allergies:  No Known Allergies     OB/GYN:  The patient is , she had her menarche at the age of 9-12, she had a hysterectomy, no oophorectomies, 1st live birth was at 20, she used OCPs for about 10 years. Physical Exam:  Pulse 71, temperature 97.2 °F (36.2 °C), height 5' 3\" (1.6 m), weight 131 lb 9.6 oz (59.7 kg), SpO2 100 %. GENERAL: Alert, oriented x 3, not in acute distress. HEENT: PERRLA; EOMI. Oropharynx clear. NECK: Supple. No palpable cervical or supraclavicular lymphadenopathy. LUNGS: Good air entry bilaterally. No wheezing, crackles or rhonchi. CHEST: right sided port. CARDIOVASCULAR: Regular rate. No murmurs, rubs or gallops. ABDOMEN: Soft. Non-tender, non-distended. Positive bowel sounds. EXTREMITIES: Without clubbing, cyanosis, or edema. NEUROLOGIC: No focal deficits. ECOG PS 0    Impression/Plan:      The patient is a 61 y.o. postmenopausal lady with a PMH significant for hyperlipidemia, who had presented with an abnormal screening Mammogram, she was diagnosed with a right breast invasive ductal carcinoma, grade 2, measuring 9 mm, ER positive, 80% MD +5% HER-2/dougie positive, 3+ by IHC.  Patient with clinical stage [id number] disease, she underwent on 4/18/2019 a right breast needle localized lumpectomy, with SLN excisional biopsy, tumor size is 2mm, grade 2, with associated DCIS, measuring 7mm, high grade, comedo type with central necrosis, margins are negative, 4 SN were removed, they were all neg for metastatic disease, final pathologic stage pT1a(sn)pN0M0, prognostic stage IA disease. I discussed with the patient and her spouse pathology results, stage and prognosis, adjuvant RT and adjuvant ET with an AI are recommended. The patient has a small Her2 pos disease (<5mm), I reviewed with her the nccn guidelines and the risks of the  Chemotherapy, she is young and healthy, she is very nervous about the diagnosis of breast cancer, she is very well informed about her disease, she prefers to be treated, JAGDISH SHAFER was originally planned, but given the size of the tumor (<5mm), decided to treat with Taxol (weekly for 12 weeks) with herceptin, the side effects of the treatment including were discussed with the patient, 2D echo has been done LVEF is adequate for treatment with Herceptin. Patient with a personal history of breast cancer, family history is positive for breast and ovarian cancer, testing for HBOC is recommended, MyRisk testing was done, neg for clinically significant mutations. The patient was started on adjuvant chemotherapy with Taxol and Herceptin on 5/28/2019, she completed Taxol on 8/20/2019, completed Herceptin on 6/2/2020. Mild macrocytosis, ordered by B-12 and folate, B12 is borderline, ordered MMA, it is not elevated, antiparietal cells and intrinsic factor antibodies done to evaluate for pernicious anemia and are negative, she was started on parenteral vitamin B12 by her PCP. She was started on adjuvant endocrine therapy with Arimidex on 10/22/2019,she is tolerating it well overall. The patient is doing well without any clinical evidence of disease recurrence, continue Arimidex.      I discussed with the patient the importance of monthly BSE, and routine screening mammograms. Bilateral screening mammogram was done this month at [facility], we do not have the results, the patient received a letter that imaging needs to be called paired to her prior mammograms. Will obtain the results of the mammogram, will check with radiology if there is anything that we can do to facilitate getting her images from [facility]. DEXA scan was done at [facility] in November 2021, revealing osteopenia with a T score of -1.1. recommended calcium and vitamin D, can try the chewables. She will have a repeat DEXA scan done in November of 2022, was ordered    The surveillance guidelines were reviewed with the patient, continue with surveillance. Labs reviewed. RTC in 6 months. Thank you for allowing us to participate in the care of Mrs. Quintanilla.     Jacquie Pinto MD   HEMATOLOGY/MEDICAL [address] [place] [address] MEDICAL ONCOLOGY  [address] [place] [id number]  Dept: [phone]: [phone]

## 2022-06-05 NOTE — PROGRESS NOTES
Pt presents to clinic for anti cancer therapy. Pt tolerated treatment well without complications. Pt was discharged ambulatory in stable condition. Yes

## 2022-11-29 DIAGNOSIS — C50.911 INFILTRATING DUCTAL CARCINOMA OF RIGHT BREAST, STAGE 1 (HCC): Primary | ICD-10-CM

## 2022-11-30 ENCOUNTER — OFFICE VISIT (OUTPATIENT)
Dept: ONCOLOGY | Age: 57
End: 2022-11-30
Payer: COMMERCIAL

## 2022-11-30 ENCOUNTER — HOSPITAL ENCOUNTER (OUTPATIENT)
Dept: INFUSION THERAPY | Age: 57
Discharge: HOME | End: 2022-11-30

## 2022-11-30 VITALS
TEMPERATURE: 97 F | BODY MASS INDEX: 23.78 KG/M2 | SYSTOLIC BLOOD PRESSURE: 110 MMHG | DIASTOLIC BLOOD PRESSURE: 69 MMHG | OXYGEN SATURATION: 99 % | HEART RATE: 69 BPM | HEIGHT: 63 IN | WEIGHT: 134.2 LBS

## 2022-11-30 DIAGNOSIS — C50.911 INFILTRATING DUCTAL CARCINOMA OF RIGHT BREAST, STAGE 1 (HCC): Primary | ICD-10-CM

## 2022-11-30 DIAGNOSIS — Z12.31 SCREENING MAMMOGRAM FOR HIGH-RISK PATIENT: ICD-10-CM

## 2022-11-30 PROCEDURE — 99214 OFFICE O/P EST MOD 30 MIN: CPT | Performed by: INTERNAL MEDICINE

## 2022-11-30 PROCEDURE — 99213 OFFICE O/P EST LOW 20 MIN: CPT

## 2022-11-30 RX ORDER — ANASTROZOLE 1 MG/1
1 TABLET ORAL DAILY
Qty: 90 TABLET | Refills: 3 | Status: SHIPPED | OUTPATIENT
Start: 2022-11-30

## 2022-11-30 NOTE — PROGRESS NOTES
[address] [facility] MEDICAL ONCOLOGY  [address] [place] [address]  [place] [address]  Dept: [phone]  Loc: [phone]  Attending Progress Note       Reason for Visit:   Right Breast Cancer. Referring Physician:  Enedina Reyes MD    PCP:  Fartun Langston MD    History of Present Illness: The patient is a 61 y.o. lady with a PMH significant for hyperlipidemia, who had presented with an abnormal screening Mammogram, was done on 3/12/2019, revealing calcifications in the right UOQ, she had a right breast US done on 3/13/74417, no masses were seen, she underwent on 3/29/2019 a stereotactic biopsy of the right breast calcifications. Due to biopsy needle malfunction and lack of tissue obtained,  a clip was not placed at the time of biopsy, path:    Breast, right, site not further specified, core needle biopsy:  Infiltrating ductal carcinoma, moderately differentiated. Ductal carcinoma in situ, comedo type, high nuclear grade. Comment:     Intradepartmental consultation is obtained. Infiltrating  ductal carcinoma corresponds to architectural grade 3, nuclear grade 2,  mitotic grade 1; overall grade 6/9-moderately differentiated; grade 2. Breast Cancer Marker Studies:  Estrogen Receptors (ER): Positive (80% nuclei staining). Staining intensity = strong. Progesterone Receptors ([place]): Positive (5% nuclei staining). Staining intensity = weak. Her-2/dougie (c-erb B-2) protein expression:  Positive (3+). She had bilateral Breast MRI done on 4/4/2019 revealing: There is scattered fibroglandular tissue with minimal background  parenchymal enhancement. An irregular, enhancing mass is noted in the  right breast 10:00 which measures 9 x 6 x 7 mm (image 43 of the axial  T1 postcontrast series). No suspicious mass or non-mass enhancement  seen on the left. There is no lymphadenopathy.  Bilateral skin and  nipple areolar complexes are normal. Visualized portions of the chest  and abdomen are unremarkable. Impression  1. Irregular, enhancing mass in the right breast 10:00 is consistent  with biopsy-proven neoplasm. No additional foci of disease identified  in the right breast.  2. There is no lymphadenopathy. 3. No evidence of neoplasm on the left. RECOMMENDATION:  Continued surgical and oncologic consultation is advised. BIRADS 6: Known neoplasm    The patient underwent on 4/18/2019 a right breast needle localized lumpectomy, with SLN excisional biopsy, path:  CANCER CASE SUMMARY  Procedure: Excision (less than total mastectomy; needle localization  partial mastectomy [lumpectomy]). Specimen laterality: Right. Tumor site: Not specified. Tumor size: 0.2 cm (invasive component). Histologic type: Invasive ductal carcinoma. Histologic grade: Glandular/tubular differentiation-score = 3; nuclear  pleomorphism-score = 2; mitotic rate-score = 1. Overall grade 2;  6/9-moderately differentiated. Tumor focality: Single focus of invasive carcinoma. Ductal carcinoma in situ: Present. Size/extent of DCIS: 0.7 cm. DCIS completely surrounds a small, residual  focus of infiltrating ductal carcinoma in the current specimen. Architectural pattern: Comedo type. Nuclear grade: Grade III (high nuclear grade). Necrosis: Present, Central (expansive \"comedo\" necrosis). Microcalcifications: Present. Margins: Invasive carcinoma margins-uninvolved by invasive carcinoma. Distance from closest margin-0.9 cm (inferior inked margin). DCIS margins-uninvolved by DCIS. Distance from closest margin-0.3 cm  (anterior inked margin of resection-specimen A). Note: Specimen C  represents additional anterior/superior breast tissue [3.0 x 2.5 x 1.0  cm] which represents larger final margin of resection for DCIS with  regard to anterior margin of resection). Regional lymph nodes: Uninvolved by tumor cells. Number of lymph nodes  examined-4 (designated sentinel lymph nodes).   Number of lymph nodes  involved-0. Treatment effect: No known presurgical therapy. Pathologic stage: pT1a-0.2 cm maximum dimension. Regional lymph nodes: (sn) pN0-no regional lymph node metastasis. Distant metastasis: pMX-cannot be assessed. Ancillary studies: Performed on the patient's previous biopsy specimen  (ARF-; 3/29/2019). Microcalcifications: Present in DCIS. Additional pathologic findings: Fibrocystic change. Previous  biopsy/excision site. The patient has a small tumor, HER-2/dougie and hormone receptor positive, given her young age and excellent health, she was not comfortable with adjuvant treatment with only hormonal therapy. The patient was started on adjuvant chemotherapy with Taxol and Herceptin on 5/28/2019, she completed Taxol on 8/20/2019, completed Herceptin on 6/2/2020, she completed adjuvant RT on 10/10/2019. The patient was started on adjuvant endocrine therapy with Arimidex on 10/22/2019. The patient is tolerating the Arimidex well overall, her legs still ache sometimes, otherwise doing well. Today, 11/30/2022, she is ding well. She had COVID 3 weeks ago, symptomatic treatment only, with residual cough which is improving. She denies fever, chills, colds, rash, headache, lightheadedness, chest pain, SOB, abdominal pain, nausea, vomiting, diarrhea, constipation, dysuria, focal weakness or numbness. She does have occasional R groin pain at night, 1x month, decreased as aching, not associated with hip or back pain. She does have R ovarian cyst, no vaginal discharge. 11/30/22 CBCD showed WBC 6.1 Hb 13.4 Platelet 719 CMP revealed normal renal and liver function. DEXA scan was not approved per insurance. Review of Systems;  CONSTITUTIONAL: No fever, chills. Good appetite and energy level. ENMT: Eyes: No diplopia; Nose: No epistaxis. Mouth: No sore throat. RESPIRATORY: No hemoptysis, shortness of breath, positive for cough.    CARDIOVASCULAR: No chest pain, palpitations. GASTROINTESTINAL: No nausea/vomiting, abdominal pain, diarrhea/constipation. GENITOURINARY: No dysuria, urinary frequency, hematuria. NEURO: No syncope, presyncope, headache. MSK: she has chronic pain in the back and right hip. Remainder:  ROS NEGATIVE    Past Medical History:      Diagnosis Date    Breast cancer ([facility] [address]     Cancer ([facility] [address]     malignant lesion in Right breast - for OR 4-18-19     History of therapeutic radiation     Hx antineoplastic chemo     Hyperlipidemia     no medications     Post-operative state 5/30/2019     Patient Active Problem List   Diagnosis    Infiltrating ductal carcinoma of right breast, stage 1 ([facility] [address]        Past Surgical History:      Procedure Laterality Date    BREAST BIOPSY      BREAST BIOPSY Right 4/18/2019    RIGHT PARTIAL MASTECTOMY WITH NEEDLE LOCALIZATION AND SENTINEL NODE [facility] / REMOVAL / REPLACEMENT VENOUS ACCESS CATHETER N/A 5/17/2019    PORT INSERTION performed by Radu Cheek MD at [address] N/A 6/26/2020    PORT REMOVAL performed by Radu Cheek MD at [facility]       Family History:  Family History   Problem Relation Age of Onset    Breast Cancer Mother     Cancer Father         esophageal, stomach    Cancer Paternal Grandfather         colon       Medications:  Reviewed and reconciled. Social History:  Social History     Socioeconomic History    Marital status:      Spouse name: Zelia Carrel     Number of children: 4    Years of education: 18     Highest education level:  Bachelor's degree (e.g., BA, AB, BS)   Occupational History    Occupation: lab technican     Employer: [address]   Tobacco Use    Smoking status: Never    Smokeless tobacco: Never   Vaping Use    Vaping Use: Never used   Substance and Sexual Activity    Alcohol use: Not Currently    Drug use: Never    Sexual activity: Yes     Partners: Male   Other Topics Concern    Not on file   Social History Narrative    Not on file     Social Determinants of Health     Financial Resource Strain: Not on file   Food Insecurity: Not on file   Transportation Needs: Not on file   Physical Activity: Not on file   Stress: Not on file   Social Connections: Not on file   Intimate Partner Violence: Not on file   Housing Stability: Not on file       Allergies:  No Known Allergies     OB/GYN:  The patient is , she had her menarche at the age of 11-12, she had a hysterectomy, no oophorectomies, 1st live birth was at 20, she used OCPs for about 10 years. Physical Exam:  Blood pressure 110/69, pulse 69, temperature 97 °F (36.1 °C), height 5' 3\" (1.6 m), weight 134 lb 3.2 oz (60.9 kg), SpO2 99 %. GENERAL: Alert, oriented x 3, not in acute distress. HEENT: PERRLA; EOMI. Oropharynx clear. NECK: Supple. No palpable cervical or supraclavicular lymphadenopathy. BREAST: no axillary lymphadenopathies bilaterally, incision scar R outer upper quadrant, no tenderness, no breast lumps/nodules palpated, no skin changes or nipple discharge  LUNGS: Good air entry bilaterally. No wheezing, crackles or rhonchi. CHEST: right sided port. CARDIOVASCULAR: Regular rate. No murmurs, rubs or gallops. ABDOMEN: Soft. Non-tender, non-distended. Positive bowel sounds. EXTREMITIES: Without clubbing, cyanosis, or edema. NEUROLOGIC: No focal deficits. ECOG PS 0    Impression/Plan:      The patient is a 61 y.o. postmenopausal lady with a PMH significant for hyperlipidemia, who had presented with an abnormal screening Mammogram, she was diagnosed with a right breast invasive ductal carcinoma, grade 2, measuring 9 mm, ER positive, 80% IL +5% HER-2/dougie positive, 3+ by IHC.  Patient with clinical stage [id number] disease, she underwent on 2019 a right breast needle localized lumpectomy, with SLN excisional biopsy, tumor size is 2mm, grade 2, with associated DCIS, measuring 7mm, high grade, comedo type with central necrosis, margins are negative, 4 SN were removed, they were all neg for metastatic disease, final pathologic stage pT1a(sn)pN0M0, prognostic stage IA disease. I discussed with the patient and her spouse pathology results, stage and prognosis, adjuvant RT and adjuvant ET with an AI are recommended. The patient has a small Her2 pos disease (<5mm), I reviewed with her the nccn guidelines and the risks of the  Chemotherapy, she is young and healthy, she is very nervous about the diagnosis of breast cancer, she is very well informed about her disease, she prefers to be treated, JAGDISH SHAFER was originally planned, but given the size of the tumor (<5mm), decided to treat with Taxol (weekly for 12 weeks) with herceptin, the side effects of the treatment including were discussed with the patient, 2D echo has been done LVEF is adequate for treatment with Herceptin. Patient with a personal history of breast cancer, family history is positive for breast and ovarian cancer, testing for HBOC is recommended, MyRisk testing was done, neg for clinically significant mutations. The patient was started on adjuvant chemotherapy with Taxol and Herceptin on 5/28/2019, she completed Taxol on 8/20/2019, completed Herceptin on 6/2/2020. Mild macrocytosis, ordered by B-12 and folate, B12 is borderline, ordered MMA, it is not elevated, antiparietal cells and intrinsic factor antibodies done to evaluate for pernicious anemia and are negative, she was started on parenteral vitamin B12 by her PCP. She was started on adjuvant endocrine therapy with Arimidex on 10/22/2019,she is tolerating it well overall. The patient is doing well without any clinical evidence of disease recurrence, continue Arimidex. I discussed with the patient the importance of monthly BSE, and routine screening mammograms.   Bilateral screening mammogram was done in May, was negative for malignancy, repeat screen mammogram was ordered to be done in May 2023. DEXA scan was done at [facility] in November 2021, revealing osteopenia with a T score of -1.1. recommended calcium and vitamin D, scan was ordered to be done a month, has not been approved by her insurance yet. The surveillance guidelines were reviewed with the patient, continue with surveillance. Today, 11/30/2022, she is doing well. She had COVID 3 weeks ago, symptomatic treatment only, with residual cough which is improving. She denies fever, chills, colds, rash, headache, lightheadedness, chest pain, SOB, abdominal pain, nausea, vomiting, diarrhea, constipation, dysuria, focal weakness or numbness. She does have occasional R groin pain at night, 1x month, decreased as aching, not associated with hip or back pain. She does have R ovarian cyst, no vaginal discharge. 11/30/22, labs reviewed, CBCD showed WBC 6.1 Hb 13.4 Platelet 617 CMP revealed normal renal and liver function. DEXA scan was not approved per insurance. RTC in 6 months. Thank you for allowing us to participate in the care of Mrs. Quintanilla.     Honorio Marques MD  PGY-2 Internal Medicine [address] Carrillo MD   HEMATOLOGY/MEDICAL [address] MEDICAL ONCOLOGY  [address] [place] [id number]  Dept: [phone] [place]: [phone]

## 2023-06-21 ENCOUNTER — HOSPITAL ENCOUNTER (OUTPATIENT)
Dept: INFUSION THERAPY | Age: 58
Discharge: HOME | End: 2023-06-21

## 2023-06-21 ENCOUNTER — OFFICE VISIT (OUTPATIENT)
Dept: ONCOLOGY | Age: 58
End: 2023-06-21
Payer: COMMERCIAL

## 2023-06-21 VITALS
TEMPERATURE: 97.5 F | DIASTOLIC BLOOD PRESSURE: 55 MMHG | HEIGHT: 63 IN | HEART RATE: 68 BPM | OXYGEN SATURATION: 100 % | WEIGHT: 132.4 LBS | BODY MASS INDEX: 23.46 KG/M2 | SYSTOLIC BLOOD PRESSURE: 112 MMHG

## 2023-06-21 DIAGNOSIS — C50.911 INFILTRATING DUCTAL CARCINOMA OF RIGHT BREAST, STAGE 1 (HCC): ICD-10-CM

## 2023-06-21 DIAGNOSIS — Z78.0 POSTMENOPAUSAL: Primary | ICD-10-CM

## 2023-06-21 PROCEDURE — 99213 OFFICE O/P EST LOW 20 MIN: CPT

## 2023-06-21 PROCEDURE — 99214 OFFICE O/P EST MOD 30 MIN: CPT | Performed by: INTERNAL MEDICINE

## 2023-06-21 RX ORDER — ANASTROZOLE 1 MG/1
1 TABLET ORAL DAILY
Qty: 90 TABLET | Refills: 3 | Status: SHIPPED | OUTPATIENT
Start: 2023-06-21

## 2023-06-21 NOTE — PROGRESS NOTES
[address] [facility] MEDICAL ONCOLOGY  [address] [place] [address]  [place] [address]  Dept: [phone]  Loc: [phone]  Attending Progress Note       Reason for Visit:   Right Breast Cancer. Referring Physician:  Jossie Moran MD    PCP:  Grant Mijares MD    History of Present Illness: The patient is a 61 y.o. lady with a PMH significant for hyperlipidemia, who had presented with an abnormal screening Mammogram, was done on 3/12/2019, revealing calcifications in the right UOQ, she had a right breast US done on 3/13/79712, no masses were seen, she underwent on 3/29/2019 a stereotactic biopsy of the right breast calcifications. Due to biopsy needle malfunction and lack of tissue obtained,  a clip was not placed at the time of biopsy, path:    Breast, right, site not further specified, core needle biopsy:  Infiltrating ductal carcinoma, moderately differentiated. Ductal carcinoma in situ, comedo type, high nuclear grade. Comment:     Intradepartmental consultation is obtained. Infiltrating  ductal carcinoma corresponds to architectural grade 3, nuclear grade 2,  mitotic grade 1; overall grade 6/9-moderately differentiated; grade 2. Breast Cancer Marker Studies:  Estrogen Receptors (ER): Positive (80% nuclei staining). Staining intensity = strong. Progesterone Receptors ([place]): Positive (5% nuclei staining). Staining intensity = weak. Her-2/dougie (c-erb B-2) protein expression:  Positive (3+). She had bilateral Breast MRI done on 4/4/2019 revealing: There is scattered fibroglandular tissue with minimal background  parenchymal enhancement. An irregular, enhancing mass is noted in the  right breast 10:00 which measures 9 x 6 x 7 mm (image 43 of the axial  T1 postcontrast series). No suspicious mass or non-mass enhancement  seen on the left. There is no lymphadenopathy.  Bilateral skin and  nipple areolar complexes are normal. Visualized portions of the

## 2024-01-26 ENCOUNTER — TELEPHONE (OUTPATIENT)
Dept: ONCOLOGY | Age: 59
End: 2024-01-26

## 2024-01-26 NOTE — TELEPHONE ENCOUNTER
This nurse called patient related to dexa scan not scheduled. Patient stated that she will call to schedule in [facility].

## 2024-02-01 DIAGNOSIS — C50.911 INFILTRATING DUCTAL CARCINOMA OF RIGHT BREAST, STAGE 1 (HCC): Primary | ICD-10-CM

## 2024-03-20 ENCOUNTER — OFFICE VISIT (OUTPATIENT)
Dept: ONCOLOGY | Age: 59
End: 2024-03-20
Payer: COMMERCIAL

## 2024-03-20 ENCOUNTER — HOSPITAL ENCOUNTER (OUTPATIENT)
Dept: INFUSION THERAPY | Age: 59
Discharge: HOME | End: 2024-03-20

## 2024-03-20 VITALS
OXYGEN SATURATION: 100 % | SYSTOLIC BLOOD PRESSURE: 103 MMHG | HEART RATE: 63 BPM | HEIGHT: 63 IN | BODY MASS INDEX: 23.64 KG/M2 | DIASTOLIC BLOOD PRESSURE: 50 MMHG | TEMPERATURE: 98.8 F | WEIGHT: 133.4 LBS

## 2024-03-20 DIAGNOSIS — Z12.31 SCREENING MAMMOGRAM FOR HIGH-RISK PATIENT: Primary | ICD-10-CM

## 2024-03-20 DIAGNOSIS — C50.911 INFILTRATING DUCTAL CARCINOMA OF RIGHT BREAST, STAGE 1 (HCC): ICD-10-CM

## 2024-03-20 PROCEDURE — 99213 OFFICE O/P EST LOW 20 MIN: CPT

## 2024-03-20 PROCEDURE — 99214 OFFICE O/P EST MOD 30 MIN: CPT | Performed by: INTERNAL MEDICINE

## 2024-03-20 RX ORDER — ANASTROZOLE 1 MG/1
1 TABLET ORAL DAILY
Qty: 90 TABLET | Refills: 3 | Status: SHIPPED | OUTPATIENT
Start: 2024-03-20

## 2024-03-20 NOTE — PROGRESS NOTES
[facility] MEDICAL ONCOLOGY  [address] [facility] [address]  Dept: [phone]  Loc: [phone]  Attending Progress Note       Reason for Visit:   Right Breast Cancer.    Referring Physician:  Zay Isidro MD    PCP:  Dash Reynolds MD    History of Present Illness:      The patient is a 58 y.o. lady with a PMH significant for hyperlipidemia, who had presented with an abnormal screening Mammogram, was done on 3/12/2019, revealing calcifications in the right UOQ, she had a right breast US done on 3/13/50660, no masses were seen, she underwent on 3/29/2019 a stereotactic biopsy of the right breast calcifications.Due to biopsy needle malfunction and lack of tissue obtained,  a clip was not placed at the time of biopsy, path:    Breast, right, site not further specified, core needle biopsy:  Infiltrating ductal carcinoma, moderately differentiated.  Ductal carcinoma in situ, comedo type, high nuclear grade.  Comment:     Intradepartmental consultation is obtained.  Infiltrating  ductal carcinoma corresponds to architectural grade 3, nuclear grade 2,  mitotic grade 1; overall grade 6/9-moderately differentiated; grade 2.  Breast Cancer Marker Studies:  Estrogen Receptors (ER): Positive (80% nuclei staining).  Staining intensity = strong.  Progesterone Receptors ([place]): Positive (5% nuclei staining).  Staining intensity = weak.  Her-2/dougie (c-erb B-2) protein expression:  Positive (3+).    She had bilateral Breast MRI done on 4/4/2019 revealing:  There is scattered fibroglandular tissue with minimal background  parenchymal enhancement. An irregular, enhancing mass is noted in the  right breast 10:00 which measures 9 x 6 x 7 mm (image 43 of the axial  T1 postcontrast series). No suspicious mass or non-mass enhancement  seen on the left. There is no lymphadenopathy. Bilateral skin and  nipple areolar complexes are normal. Visualized portions of the

## 2024-05-03 DIAGNOSIS — C50.911 INFILTRATING DUCTAL CARCINOMA OF RIGHT BREAST, STAGE 1 (HCC): Primary | ICD-10-CM

## (undated) DEVICE — SURGICAL PROCEDURE PACK BASIC

## (undated) DEVICE — GOWN,AURORA,NONREINF,RAGLAN,L,STERILE: Brand: MEDLINE

## (undated) DEVICE — MAT SURG W36XL56IN GRN FOAM ANTIFATIGUE NONSLIP DRISAFE

## (undated) DEVICE — GLOVE ORANGE PI 8   MSG9080

## (undated) DEVICE — Z DISCONTINUED APPLICATOR SURG PREP 0.35OZ 2% CHG 70% ISO ALC W/ HI LT

## (undated) DEVICE — Z DUP USE 2257490 ADHESIVE SKIN CLSRE 036ML TPCL 2CTL CNCRLTE HIGH VSCSTY DRMB

## (undated) DEVICE — STANDARD HYPODERMIC NEEDLE,POLYPROPYLENE HUB: Brand: MONOJECT

## (undated) DEVICE — SKIN AFFIX SURG ADHESIVE 72/CS 0.55ML: Brand: MEDLINE

## (undated) DEVICE — PATIENT RETURN ELECTRODE, SINGLE-USE, CONTACT QUALITY MONITORING, ADULT, WITH 9FT CORD, FOR PATIENTS WEIGING OVER 33LBS. (15KG): Brand: MEGADYNE

## (undated) DEVICE — NEEDLE HYPO 25GA L1.5IN BLU POLYPR HUB S STL REG BVL STR

## (undated) DEVICE — MEDIA CONTRAST RX ISOVUE-300 61% 30ML VIALS

## (undated) DEVICE — LABEL MED 4 IN SURG PANEL W/ PEN STRL

## (undated) DEVICE — BLADE CLIPPER GEN PURP NS

## (undated) DEVICE — PACK PROCEDURE SURG GEN CUST

## (undated) DEVICE — GOWN,SIRUS,FABRNF,XL,20/CS: Brand: MEDLINE

## (undated) DEVICE — E-Z CLEAN, NON-STICK, PTFE COATED, ELECTROSURGICAL BLADE ELECTRODE, MODIFIED EXTENDED INSULATION, 2.5 INCH (6.35 CM): Brand: MEGADYNE

## (undated) DEVICE — DOUBLE BASIN SET: Brand: MEDLINE INDUSTRIES, INC.

## (undated) DEVICE — PENCIL,CAUTERY,ROCKER,PTFE,15'CORD: Brand: MEDLINE INDUSTRIES, INC.

## (undated) DEVICE — TOWEL,OR,DSP,ST,BLUE,STD,6/PK,12PK/CS: Brand: MEDLINE

## (undated) DEVICE — PACK,LAPAROTOMY,NO GOWNS: Brand: MEDLINE

## (undated) DEVICE — SWABSTICK SURG PREP BENZOIN TINCTURE SINGLE ST

## (undated) DEVICE — CLOTH SURG PREP PREOPERATIVE CHLORHEXIDINE GLUC 2% READYPREP

## (undated) DEVICE — GLOVE ORANGE PI 7 1/2   MSG9075

## (undated) DEVICE — GOWN,SIRUS,FABRNF,L,20/CS: Brand: MEDLINE

## (undated) DEVICE — DRAPE C ARM UNIV W41XL74IN CLR PLAS XR VELC CLSR POLY STRP

## (undated) DEVICE — SYRINGE MED 10ML POLYPR LUERSLIP TIP FLAT TOP W/O SFTY DISP

## (undated) DEVICE — MAGNETIC INSTR DRAPE 20X16: Brand: MEDLINE INDUSTRIES, INC.

## (undated) DEVICE — SET SURG INSTR MINI VASC

## (undated) DEVICE — SOLUTION IV 100ML 0.9% SOD CHL PLAS CONT USP VIAFLX 1 PER

## (undated) DEVICE — GAUZE,SPONGE,4"X4",8PLY,STRL,LF,10/TRAY: Brand: MEDLINE

## (undated) DEVICE — KIT SURG PREP POVIDONE IOD PRESATURATED PAINT WET FOR UNIV

## (undated) DEVICE — INTENDED FOR TISSUE SEPARATION, AND OTHER PROCEDURES THAT REQUIRE A SHARP SURGICAL BLADE TO PUNCTURE OR CUT.: Brand: BARD-PARKER ® STAINLESS STEEL BLADES

## (undated) DEVICE — ALCOHOL RUBBING ISO 16OZ 70%

## (undated) DEVICE — SURGICAL PROCEDURE PACK VASC MAJ CUST

## (undated) DEVICE — GLOVE SURG SZ 7.5 L11.73IN FNGR THK9.8MIL STRW LTX POLYMER

## (undated) DEVICE — E-Z CLEAN, NON-STICK, PTFE COATED, ELECTROSURGICAL NEEDLE ELECTRODE, MODIFIED EXTENDED INSULATION, 2.75 INCH (7 CM): Brand: MEGADYNE

## (undated) DEVICE — STANDARD HYPODERMIC NEEDLE,ALUMINUM HUB: Brand: MONOJECT

## (undated) DEVICE — STRIP,CLOSURE,WOUND,MEDI-STRIP,1/4X3: Brand: MEDLINE

## (undated) DEVICE — DRAPE,CHEST,FENES,15X10,STERIL: Brand: MEDLINE